# Patient Record
Sex: FEMALE | Race: ASIAN | NOT HISPANIC OR LATINO | Employment: FULL TIME | URBAN - METROPOLITAN AREA
[De-identification: names, ages, dates, MRNs, and addresses within clinical notes are randomized per-mention and may not be internally consistent; named-entity substitution may affect disease eponyms.]

---

## 2017-01-09 ENCOUNTER — GENERIC CONVERSION - ENCOUNTER (OUTPATIENT)
Dept: OTHER | Facility: OTHER | Age: 27
End: 2017-01-09

## 2017-01-12 ENCOUNTER — LAB CONVERSION - ENCOUNTER (OUTPATIENT)
Dept: OTHER | Facility: OTHER | Age: 27
End: 2017-01-12

## 2017-01-12 LAB
ADDITIONAL INFORMATION (HISTORICAL): NORMAL
ADEQUACY: (HISTORICAL): NORMAL
COMMENT (HISTORICAL): NORMAL
CYTOTECHNOLOGIST: (HISTORICAL): NORMAL
HPV MRNA E6/E7 (HISTORICAL): NOT DETECTED
INTERPRETATION (HISTORICAL): NORMAL
LMP (HISTORICAL): NORMAL
PREV. BX: (HISTORICAL): NORMAL
PREV. PAP (HISTORICAL): NORMAL
SOURCE (HISTORICAL): NORMAL

## 2017-10-05 ENCOUNTER — ALLSCRIPTS OFFICE VISIT (OUTPATIENT)
Dept: OTHER | Facility: OTHER | Age: 27
End: 2017-10-05

## 2017-10-17 LAB
AB SCRN, RBC W/RFLX ID,TITER,AG (HISTORICAL): NORMAL
ABO GROUP BLD: NORMAL
BASOPHILS # BLD AUTO: 0.4 %
BASOPHILS # BLD AUTO: 32 CELLS/UL (ref 0–200)
BILIRUB UR QL STRIP: NEGATIVE
COLOR UR: YELLOW
COMMENT (HISTORICAL): CLEAR
DEPRECATED RDW RBC AUTO: 12.2 % (ref 11–15)
EOSINOPHIL # BLD AUTO: 0.9 %
EOSINOPHIL # BLD AUTO: 73 CELLS/UL (ref 15–500)
FECAL OCCULT BLOOD DIAGNOSTIC (HISTORICAL): NEGATIVE
GLUCOSE (HISTORICAL): NEGATIVE
HCT VFR BLD AUTO: 39.4 % (ref 35–45)
HEPATITIS B SURFACE ANTIGEN (HISTORICAL): NORMAL
HGB BLD-MCNC: 13.1 G/DL (ref 11.7–15.5)
KETONES UR STRIP-MCNC: NEGATIVE MG/DL
LEUKOCYTE ESTERASE UR QL STRIP: NEGATIVE
LYMPHOCYTES # BLD AUTO: 2187 CELLS/UL (ref 850–3900)
LYMPHOCYTES # BLD AUTO: 27 %
MCH RBC QN AUTO: 28.7 PG (ref 27–33)
MCHC RBC AUTO-ENTMCNC: 33.2 G/DL (ref 32–36)
MCV RBC AUTO: 86.2 FL (ref 80–100)
MONOCYTES # BLD AUTO: 437 CELLS/UL (ref 200–950)
MONOCYTES (HISTORICAL): 5.4 %
NEUTROPHILS # BLD AUTO: 5370 CELLS/UL (ref 1500–7800)
NEUTROPHILS # BLD AUTO: 66.3 %
NITRITE UR QL STRIP: NEGATIVE
PH UR STRIP.AUTO: 6.5 [PH] (ref 5–8)
PLATELET # BLD AUTO: 277 THOUSAND/UL (ref 140–400)
PMV BLD AUTO: 10.4 FL (ref 7.5–12.5)
PROT UR STRIP-MCNC: NEGATIVE MG/DL
RBC # BLD AUTO: 4.57 MILLION/UL (ref 3.8–5.1)
RH BLD: NORMAL
RPR SCREEN (HISTORICAL): NORMAL
RUBELLA, IGG (HISTORICAL): 3.77 INDEX
SP GR UR STRIP.AUTO: 1.02 (ref 1–1.03)
WBC # BLD AUTO: 8.1 THOUSAND/UL (ref 3.8–10.8)

## 2017-10-19 ENCOUNTER — ALLSCRIPTS OFFICE VISIT (OUTPATIENT)
Dept: OTHER | Facility: OTHER | Age: 27
End: 2017-10-19

## 2017-10-19 LAB
GLUCOSE (HISTORICAL): NEGATIVE
LEUKOCYTE ESTERASE UR QL STRIP: NEGATIVE
NITRITE UR QL STRIP: NEGATIVE
PROT UR STRIP-MCNC: NEGATIVE MG/DL

## 2017-10-20 LAB — CLINICAL COMMENT (HISTORICAL): NORMAL

## 2017-10-23 ENCOUNTER — LAB CONVERSION - ENCOUNTER (OUTPATIENT)
Dept: OTHER | Facility: OTHER | Age: 27
End: 2017-10-23

## 2017-10-23 LAB
ADDITIONAL INFORMATION (HISTORICAL): NORMAL
ADEQUACY: (HISTORICAL): NORMAL
COMMENT (HISTORICAL): NORMAL
CYTOTECHNOLOGIST: (HISTORICAL): NORMAL
INTERPRETATION (HISTORICAL): NORMAL
LMP (HISTORICAL): NORMAL
PREV. BX: (HISTORICAL): NORMAL
PREV. PAP (HISTORICAL): NORMAL
SOURCE (HISTORICAL): NORMAL

## 2017-11-20 ENCOUNTER — GENERIC CONVERSION - ENCOUNTER (OUTPATIENT)
Dept: OTHER | Facility: OTHER | Age: 27
End: 2017-11-20

## 2017-12-18 ENCOUNTER — ALLSCRIPTS OFFICE VISIT (OUTPATIENT)
Dept: PERINATAL CARE | Facility: CLINIC | Age: 27
End: 2017-12-18
Payer: COMMERCIAL

## 2017-12-18 ENCOUNTER — GENERIC CONVERSION - ENCOUNTER (OUTPATIENT)
Dept: OTHER | Facility: OTHER | Age: 27
End: 2017-12-18

## 2017-12-18 PROCEDURE — 76817 TRANSVAGINAL US OBSTETRIC: CPT | Performed by: OBSTETRICS & GYNECOLOGY

## 2017-12-18 PROCEDURE — 76811 OB US DETAILED SNGL FETUS: CPT | Performed by: OBSTETRICS & GYNECOLOGY

## 2017-12-20 ENCOUNTER — ALLSCRIPTS OFFICE VISIT (OUTPATIENT)
Dept: OTHER | Facility: OTHER | Age: 27
End: 2017-12-20

## 2018-01-12 NOTE — PROGRESS NOTES
2016         RE: Vahe Paige                                  To: ANTWAN Aragon    MR#: [id number]                                    [facility] Ob[facility]   : AUG [address]  Way: [id number]:CRISTINA Rich, [address]   (Exam #: [id number])                           Fax: [phone]      The LMP of this 26year old,  G1, P0-0-0-0 patient was 2016, her   working LEONCIO is 2016 and the current gestational age is 25 weeks 5   days by 1st Trimester Sono  A sonographic examination was performed on 2016 using real time equipment  The ultrasound examination was   performed using abdominal & vaginal techniques  The patient has a BMI of   24 7  Her blood pressure today was 107/66         Earliest ultrasound found in her record:16  8w1d 16 LEONCIO      Cardiac motion was observed at 156 bpm       INDICATIONS      fetal anatomical survey      Exam Types      LEVEL II   Transvaginal      RESULTS      Fetus # 1 of 1   Vertex presentation   Fetal growth appeared normal   Placenta Location = Posterior   No placenta previa   Placenta Grade = I      MEASUREMENTS (* Included In Average GA)      AC              14 9 cm        19 weeks 6 days* (34%)   BPD              4 9 cm        20 weeks 5 days* (48%)   HC              18 0 cm        20 weeks 2 days* (38%)   Femur            3 4 cm        20 weeks 6 days* (45%)      Nuchal Fold      4 9 mm      Humerus          3 2 cm        20 weeks 6 days  (53%)   Radius           2 6 cm        20 weeks 6 days   Ulna             2 8 cm        20 weeks 1 day   Tibia            2 8 cm        20 weeks 2 days  (29%)   Fibula           2 8 cm        19 weeks 4 days   Foot             3 3 cm        20 weeks 1 day      Cerebellum       2 1 cm        21 weeks 0 days   Biorbit          3 2 cm        20 weeks 2 days   CisternaMagna    4 7 mm      HC/AC 1 21   FL/AC           0 23   FL/BPD          0 70   EFW (Ac/Fl/Hc)   347 grams - 0 lbs 12 oz      THE AVERAGE GESTATIONAL AGE is 20 weeks 3 days +/- 10 days  AMNIOTIC FLUID         Largest Vertical Pocket = 4 7 cm   Amniotic Fluid: Normal      CERVICAL EVALUATION      SUPINE      Cervical Length: 4 00 cm      OTHER TEST RESULTS           Funneling?: No             Dynamic Changes?: No        Resp  To TFP?: No      ANATOMY      Head                                    Normal   Face/Neck                               Normal   Th  Cav  Normal   Heart                                   Normal   Abd  Cav  Normal   Stomach                                 Normal   Right Kidney                            Normal   Left Kidney                             Normal   Bladder                                 Normal   Abd  Wall                               Normal   Spine                                   Normal   Extrems                                 Normal   Genitalia                               Normal   Placenta                                Normal   Umbl  Cord                              Normal   Uterus                                  Normal   PCI                                     Normal      ANATOMY DETAILS      Visualized Appearing Sonographically Normal:   HEAD: (Calvarium, BPD Level, Cavum, Lateral Ventricles, Choroid Plexus,   Cerebellum, Cisterna Magna);    FACE/NECK: (Neck, Nuchal Fold, Profile,   Orbits, Nose/Lips, Palate, Face);    TH  CAV : (Diaphragm); HEART:   (Four Chamber View, Proximal Left Outflow, Proximal Right Outflow, 3   Vessel Trachea, Short Axis of Greater Vessels, Ductal Arch, Aortic Arch,   Interventricular Septum, Interatrial Septum, Cardiac Axis, Cardiac   Position);    ABD  CAV : (Gall Bladder);    STOMACH, RIGHT KIDNEY, LEFT   KIDNEY, BLADDER, ABD   WALL, SPINE: (Cervical Spine, Thoracic Spine, Lumbar   Spine, Sacrum); EXTREMS: (Lt Humerus, Rt Humerus, Lt Forearm, Rt   Forearm, Lt Hand, Rt Hand, Lt Femur, Rt Femur, Lt Low Leg, Rt Low Leg, Lt   Foot, Rt Foot);    GENITALIA, PLACENTA, UMBL  CORD, UTERUS, PCI      ADNEXA      The left ovary appeared normal and measured 2 0 x 2 0 x 1 8 cm with a   volume of 3 8 cc  The right ovary appeared normal and measured 2 6 x 2 5 x   2 1 cm with a volume of 7 1 cc  IMPRESSION      Estevez IUP   20 weeks and 3 days by this ultrasound  (LEONCIO=2016)   20 weeks and 5 days by 1st Tri Sono  (LEONCIO=2016)   Vertex presentation   Fetal growth appeared normal   Normal anatomy survey   Regular fetal heart rate of 156 bpm   Posterior placenta   No placenta previa      GENERAL COMMENT            I had the pleasure of seeing Raf Ho in the  center on  for level II ultrasound  She is a 16-year-old  1 para 0 with a   working due date of  currently at 20 weeks and 5 days  Her body   mass index is 24  She did have a reassuring part 1 of the sequential   screen  She denies any pregnancy complications  Today's ultrasound was reassuring  A viable fetus was seen in the vertex   presentation  The placenta is posterior in location and there is no   evidence of a placenta previa  Amniotic fluid appeared normal  Fetal   growth appeared very appropriate and correlated well with her due date  There were no obvious anomalies seen today  The anatomic survey was   completed today  There were no Down syndrome markers seen  Both maternal   ovaries were seen and appeared normal  There were no obvious subchorionic   hematomas or uterine myomas  Her cervix was seen transvaginally and   appeared normal in length with no evidence of funneling or dynamic change  The placental cord insertion were seen and was normal in location  The patient appeared well-nourished, well-developed, and in no apparent   distress  Her uterus is nontender   Her abdomen was gravid and nontender  We discussed kick counts in the office today  We discussed the 10 kicks in   2 hour rule  I asked her to call your office if criteria are not met  She   should continue to do her kick counts on a daily basis  Kick counts should   begin at 28 weeks  Overall today's ultrasound was very reassuring for Deyanira  The fetus   appears to be growing well  There were no obvious birth defects nor any   Down syndrome markers  Anatomic survey was completed today  Her cervix was   long and closed  Thus today's ultrasound was reassuring  Further   ultrasounds are at your discretion  Thank you kindly for this referral       ROSS Morales M D     Maternal-Fetal Medicine   Electronically signed 07/11/16 13:59

## 2018-01-12 NOTE — PROGRESS NOTES
Chief Complaint  Patient was in today for OB visit and received flu vaccine into Left Deltoid and handled injection well with no complications    NDC: [id number]  Lot: [id number]  Exp: 6/30/2017      Active Problems    1  Dysmenorrhea (625 3) (N94 6)   2  Encounter for routine gynecological examination (V72 31) (Z01 419)   3  Encounter for supervision of normal first pregnancy (V22 0) (Z34 00)   4  Encounter for supervision of normal first pregnancy in first trimester (V22 0) (Z34 01)   5  Flu vaccine need (V04 81) (Z23)   6  Need for Tdap vaccination (V06 1) (Z23)   7  Oral contraceptive prescribed (V25 01) (Z30 011)   8  Pap smear, as part of routine gynecological examination (V76 2) (Z01 419)   9  Pregnancy (V22 2) (Z33 1)   10  UTI (urinary tract infection) (599 0) (N39 0)    Current Meds   1  Prenate AM 1 MG Oral Tablet; Take 1 tablet daily; Therapy: 50FGH7402 to (Evaluate:26Dgz0121)  Requested for: 14Rth7045; Last   Rx:55Tbm0873 Ordered    Allergies    1  No Known Drug Allergies    2  No Known Environmental Allergies   3   No Known Food Allergies    Vitals  Signs    Systolic: 676  Diastolic: 88  Height: 5 ft 4 in  Weight: 173 lb 4 00 oz  BMI Calculated: 29 74  BSA Calculated: 1 85    Results/Data  OB Global Urine Dip Non-Automated - POC 83CQR7304 03:36PM Mario Elaine     Test Name Result Flag Reference   Protein neg     Glucose neg         Plan  Encounter for supervision of normal first pregnancy    · OB Global Urine Dip Non-Automated - POC; Status:Complete;   Done: 08WHK7713  03:36PM    Future Appointments    Date/Time Provider Specialty Site   10/27/2016 02:30 PM Ana Rosa Swartz [facility] Obstetrics/Gynecology [address] [place] [address] OB/GYN   10/27/2016 02:15 PM Jake Toribio OB/GYN     Signatures   Electronically signed by : Alecia Josue, ; Oct 19 2016  3:47PM EST                       (Author)    Electronically signed by : Carmelina Liu, [facility]; Oct 19 2016 4:02PM EST                       (Author)    Electronically signed by : ANTWAN Angel ; Oct 19 2016  5:41PM EST                       (Author)

## 2018-01-13 VITALS
BODY MASS INDEX: 24.59 KG/M2 | SYSTOLIC BLOOD PRESSURE: 120 MMHG | WEIGHT: 144 LBS | HEIGHT: 64 IN | DIASTOLIC BLOOD PRESSURE: 60 MMHG

## 2018-01-14 NOTE — PROGRESS NOTES
History of Present Illness  HPI: 32 yo  presents for OB intake  She is 10 4/7 wks gestation, calculated by dating US  LMP was inaccurate due to conception while lactating  Final [facility] 18    She plans to continue breast feeding her 9 month old for another two months  OB hx:  2016  (due to cat 2 strip during IOL), full-term healthy female  Hyperemesis during this pregnancy  Past medical history: unremarkable  Past surgical history:   Past family history: unremarkable  Genetic Hx: unremarkable  Infection Hx: unremarkable    Plan:  Normal 1st trimester pregnancy, multigravida  Feeling well overall  Will continue to breast feed for two more months-- reassured of safety  Prenatal lab requisition provided  Discussed screening for aneuploidy-- patient declines  Level 2 US booked with [facility] on 17  Hx of -- patient desires TOLAC    Hx hyperemesis in 1st pregnancy-- no nausea/ vomiting currently    RV 2 wks for initial OB exam- pap & cultures       Active Problems    1  Dysmenorrhea (625 3) (N94 6)   2  Elevated blood pressure   3  Encounter for routine gynecological examination (V72 31) (Z01 419)   4  Encounter for supervision of normal first pregnancy (V22 0) (Z34 00)   5  Encounter for supervision of normal first pregnancy in first trimester (V22 0) (Z34 01)   6  Encounter for supervision of normal pregnancy in multigravida in first trimester (V22 1)   (Z34 81)   7  Flu vaccine need (V04 81) (Z23)   8  Need for Tdap vaccination (V06 1) (Z23)   9  Oral contraceptive prescribed (V25 01) (Z30 011)   10  Pap smear, as part of routine gynecological examination (V76 2) (Z01 419)   11  Pregnancy (V22 2) (Z34 90)   12  UTI (urinary tract infection) (599 0) (N39 0)    Current Meds   1  Prenate AM 1 MG Oral Tablet; Take 1 tablet daily; Therapy: 75FLF9926 to (Evaluate:43Xnk4285)  Requested for: 88Akx8788; Last   Rx:58Nge7698 Ordered    Allergies    1   No Known Drug Allergies    2  No Known Environmental Allergies   3  No Known Food Allergies    Plan  Encounter for supervision of normal pregnancy in multigravida in first trimester    · (Q) CULTURE, URINE, SPECIAL; Status:Active; Requested NPF:98XBC0845;    · (Q) OBSTETRIC PANEL; Status:Active; Requested DJD:28KGG5047;    · (Q) URINALYSIS REFLEX; Status:Active; Requested CTW:58LWO3342;      Future Appointments    Date/Time Provider Specialty Site   2017 01:00 PM  [address]   10/19/2017 04:00 PM IDA Santana  Obstetrics/Gynecology [address] [facility] OB/GYN     Signatures   Electronically signed by : Chauncey Segura NP; Oct  5 2017  5:31PM EST                       (Author)    Electronically signed by : ANTWAN Angel ; Oct  6 2017 12:16PM EST                       (Author)

## 2018-01-14 NOTE — PROGRESS NOTES
To whom it may concern Archie Cueto is our obstetric PT and due to being pregnant pt should not be working more than 8hrs a day she can take the recommended break for working an 8hr shift  Any question or concern may contact  the office       Electronically signed Lennie Morse   May 13 2016  9:25AM EST        Electronically signed by:Milena Manuel [facility]  May 13 2016  9:38AM EST        Electronically signed Gertrude WAHL    May 16 2016 12:28PM EST

## 2018-01-15 ENCOUNTER — OB ABSTRACT (OUTPATIENT)
Dept: OBGYN CLINIC | Facility: CLINIC | Age: 28
End: 2018-01-15

## 2018-01-15 NOTE — MISCELLANEOUS
Message  Spoke with patient regarding CBC  H/H decreased from blood work 4 months ago, but still low end of normal  Increase iron in diet or try OTC supplement once a day to see if that helps alleviate symptoms  Signatures   Electronically signed by : Luca Jeffrey, [facility];  Aug 19 2016  1:47PM EST                       (Author)

## 2018-01-16 NOTE — PROGRESS NOTES
Chief Complaint  pT WAS GIVEN A INTRAMUSCULAR FLU VACCINE ON LEFT DELTOID WITHOUT ANY COMPLICATION  DEW;[id number]  EXP06/30/2018  LOT;[id number]      Active Problems    1  Dysmenorrhea (625 3) (N94 6)   2  Elevated blood pressure   3  Encounter for routine gynecological examination (V72 31) (Z01 419)   4  Encounter for supervision of normal first pregnancy (V22 0) (Z34 00)   5  Encounter for supervision of normal first pregnancy in first trimester (V22 0) (Z34 01)   6  Encounter for supervision of normal pregnancy in multigravida in first trimester (V22 1)   (Z34 81)   7  Flu vaccine need (V04 81) (Z23)   8  Need for Tdap vaccination (V06 1) (Z23)   9  Oral contraceptive prescribed (V25 01) (Z30 011)   10  Pap smear, as part of routine gynecological examination (V76 2) (Z01 419)   11  Pregnancy (V22 2) (Z34 90)   12  UTI (urinary tract infection) (599 0) (N39 0)    Current Meds   1  Prenate AM 1 MG Oral Tablet; Take 1 tablet daily; Therapy: 78EFJ5896 to (Evaluate:59Qts4607)  Requested for: 26Rfy0437; Last   Rx:64Xkz8817 Ordered    Allergies    1  No Known Drug Allergies    2  No Known Environmental Allergies   3  No Known Food Allergies    Vitals  Signs    Systolic: 035  Diastolic: 60  Height: 5 ft 4 in  Weight: 144 lb   BMI Calculated: 24 72  BSA Calculated: 1 7    Results/Data  OB Global Urine Dip Non-Automated - POC 71YXU5966 04:18PM Vinny Sandoval     Test Name Result Flag Reference   Leukocytes Negative     Nitrite Negative     Protein Negative     Glucose Negative         Plan   Pregnancy    · OB Global Urine Dip Non-Automated - POC; Status:Complete;   Done: 99JXO2140  04:18PM    (1) THIN PREP PAP WITH IMAGING; Status:Active; Requested WBX:84WND9869;   Perform:[employer]; Order Comments:Cervical/Endocervical; Due:19Oct2018; Ordered; For:Pregnancy;  Ordered By:Carrie Syed;   ABHILASH(R) CHLAMYDIA/ N  GONORRHOEAE RNA, TMA; Status:Resulted - Requires Verification;   Done: 14PNA5308 12: 00AM  Due:90You7503; Ordered; For:Pregnancy; Ordered By:Jose Syed Mage; Future Appointments    Date/Time Provider Specialty Site   11/15/2017 02:30 PM ANTWAN Guo   [address] OB/GYN   2017 03:00 PM  [facility], [address]   Electronically signed by : Gary Morel NP; Oct 19 2017  6:22PM EST                       (Author)    Electronically signed by : Herman Cushing, M D ; Oct 25 2017 10:06PM EST                       (Author)

## 2018-01-17 ENCOUNTER — ALLSCRIPTS OFFICE VISIT (OUTPATIENT)
Dept: OTHER | Facility: OTHER | Age: 28
End: 2018-01-17

## 2018-01-17 ENCOUNTER — GENERIC CONVERSION - ENCOUNTER (OUTPATIENT)
Dept: OTHER | Facility: OTHER | Age: 28
End: 2018-01-17

## 2018-01-17 NOTE — PROGRESS NOTES
Chief Complaint  Patient is a  with an LEONCIO of 16 by U/S on 16  She is 9w 1d today  Here today with her  for OB intake  Patient c/o of frequent nausea and is vomiting once a day  She does not desire any Zofran today  Currently taking One A Day prenatals, but she does not like them because of a fishy taste  Patient had questions regarding travel  She and her  are taking a cruise to the [facility] the first week of May  Recommended patient to take Zofran with her in case of worsening nausea  She will call if she desires an rx  Also discussed risks of Zika virus, and instructions to prevent mosquito bites  Referred patient to [facility] [employer] web site for further information  Patient is a  at [employer]  Works in a sterile environment, 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 alcohol fumes  Patient to avoid further exposure, or make sure she is wearing proper ventilation equipment  No latex allergy, but she does have sensitive skin; repeated exposure to gloves gives her hives  Slip given for OB labs  Went over [place], and all information given  Consent form signed  Referral entered for sequential screen  No other questions or concerns  Has initial OB appt 16  WIll only need cultures at that time; last PAP was 2016  Time spent 1 hr  Active Problems    1  Dysmenorrhea (625 3) (N94 6)   2  Encounter for routine gynecological examination (V72 31) (Z01 419)   3  Encounter for supervision of normal first pregnancy in first trimester (V22 0) (Z34 01)   4  Oral contraceptive prescribed (V25 01) (Z30 011)   5  Pap smear, as part of routine gynecological examination (V76 2) (Z01 419)    Current Meds   1  Norethin Ace-Eth Estrad-FE 1-20 MG-MCG Oral Tablet; TAKE ONE TABLET BY MOUTH   ONCE DAILY; Therapy: 43JQB1032 to (Last Rx:2016)  Requested for: 63XBL4915 Ordered    Allergies    1  No Known Drug Allergies    2  No Known Environmental Allergies   3   No Known Food Allergies    Assessment    1  Encounter for supervision of normal first pregnancy in first trimester (V22 0) (Z34 01)    Plan  Dysmenorrhea    · Norethin Ace-Eth Estrad-FE 1-20 MG-MCG Oral Tablet (Loestrin Fe 1/20)  Encounter for supervision of normal first pregnancy in first trimester    · One-A-Day Womens Prenatal 28-0 8 & 223 MG Oral Miscellaneous; TAKE 1 MG  Daily   · (1) HIV AG/AB COMBO, 4TH GEN; [Do Not Release]; Status:Active; Requested  for:21Apr2016;    · (1) PRENATAL PANEL; Status:Active; Requested for:21Apr2016;    · (1) URINALYSIS w URINE C/S REFLEX (will reflex a microscopy if leukocytes, occult  blood, or nitrites are not within normal limits); Status:Active; Requested for:21Apr2016;    · *6 - [facility] Physician Referral  Consult for sequential screen  Status:  Hold For - Scheduling  Requested for: 21Apr2016  Care Summary provided  : Yes    Future Appointments    Date/Time Provider Specialty Site   05/09/2016 01:00 PM ANTWAN Biggs  Obstetrics/Gynecology [address] [facility] OB/GYN     Signatures   Electronically signed [facility] : [facility];  Apr 21 2016  4:11PM EST                       (Author)    Electronically signed by : ANTWAN Busch ; Apr 22 2016  9:33AM EST                       (Author)

## 2018-01-17 NOTE — MISCELLANEOUS
Message  Spoke with patient regarding prenatal blood work  Blood type AB+  Not immune to rubella  Explained that we cannot give booster during pregnancy because it is live attenuated virus  Recommended universal precautions  Patient is not in close contact with small children; risk is small  U/A showed UTI  Rx for Macrobid 100 mg BID x 7 days sent to pharmacy  Patient also requested Zofran for cruise  Rx sent  Patient to call with any problems  Signatures   Electronically signed by : Marcelo Lozano, [facility];  Apr 28 2016  2:35PM EST                       (Author)

## 2018-01-18 NOTE — PROGRESS NOTES
MAY 16 2016         RE: Callum Beltran                             To: ANTWAN Gusman    MR#: [id number]                                    [facility]   : [address]  Way: [phone]                             [address]   (Exam #: [id number])                           Fax: [phone]      The LMP of this 26year old,  G1, P0-0-0-0 patient was 2016, her   working LEONCIO is 2016 and the current gestational age is 16 weeks 5   days by [address] [place] [address]  A sonographic examination was performed on MAY   16 2016 using real time equipment  The ultrasound examination was   performed using abdominal technique  The patient has a BMI of 22 3  Her   blood pressure today was 126/65  Earliest ultrasound found in her record:16  8w1d 16 LEONCIO      Thank you very much for referring this very nice patient for a    consultation and genetic screening ultrasound  This is the patient's   first pregnancy and it has been complicated by significant nausea and   vomiting requiring Zofran and Diclegis  The patient has been getting some   relief with this medication although she has lost approximately 5 pounds  She has no other significant medical or surgical history  She denies the   current use of tobacco, alcohol, or drugs  Her medications include   prenatal vitamins and she has no significant drug allergies  Her family   medical history is noncontributory  A review of systems is otherwise   negative  On exam, the patient appears well, in no acute distress, and her   abdomen is nontender  Multiple longitudinal and transverse sections revealed a izaguirre   intrauterine pregnancy with the fetus in variable presentation  The   placenta is anterior in implantation, grade 0 in appearance, and there is   no placenta previa        Cardiac motion was observed at 159 bpm  INDICATIONS      first trimester screening      Exam Types      Level I      RESULTS      Fetus # 1 of 1   Variable presentation   Fetal growth appeared normal      MEASUREMENTS (* Included In Average GA)      CRL              5 9 cm        12 weeks 1 day *   Nuchal Trans    1 50 mm      THE AVERAGE GESTATIONAL AGE is 12 weeks 1 day +/- 7 days  UTERINE ARTERIES                                  S/D   PI    RI    NOTCH       Left Uterine Artery              1 33         No       Right Uterine Artery             1 64         No      ANATOMY COMMENTS      Anatomic detail is limited at this gestational age  The yolk sac was not   noted  The fetal cranium appeared normal in shape and the nuchal   translucency was normal in size (1 5mm)  The nasal bone appears to be   present  The intracranial anatomy was unremarkable  Evaluation of the   spine revealed no obvious evidence for a neural tube defect  Anatomy of   the fetal thorax appeared within normal limits  The cardiac rhythm was   regular  Within the abdomen, stomach was visualized and the abdominal   wall appeared intact  A three vessel cord appears to be present  Active   movement of the fetal body & extremities was seen  There is no suspicion   of a subchorionic bleed  The placental cord insertion was normal    The   uterine artery Dopplers are normal for this gestational age  There is no   suspicion of a uterine myoma  Free fluid is not seen in the posterior   cul-de-sac  ADNEXA      The left ovary appeared normal and measured 2 9 x 2 3 x 1 2 cm with a   volume of 4 2 cc  The right ovary appeared normal and measured 2 7 x 1 3 x   0 8 cm with a volume of 1 5 cc        AMNIOTIC FLUID         Largest Vertical Pocket = 3 6 cm   Amniotic Fluid: Normal      IMPRESSION      Estevez IUP   12 weeks and 1 day by this ultrasound  (LEONCIO=NOV 27 2016)   12 weeks and 5 days by 1st Tri Sono  (LEONCIO=NOV 23 2016)   Variable presentation   Fetal growth appeared normal   Regular fetal heart rate of 159 bpm   Anterior placenta   No placenta previa      GENERAL COMMENT      We discussed the options for genetic screening, including but not limited   to first trimester screening, second trimester screening, combined first   and second trimester screening, noninvasive prenatal testing (NIPT) for   patients at high risk and diagnostic screening through the use of CVS and   amniocentesis  We discussed the risks and benefits of each approach   including the sensitivities and false positive rates as well as the   difference between a screening test and a diagnostic test   At the   conclusion of our discussion the patient elected Sequential Screening to   delineate her risk for fetal aneuploidy  A maternal blood sample was   obtained on the day of sonogram   The first trimester portion of the   screening results, encompassing age, nuchal translucency, and biochemistry   should be available within one week of testing and will be reported from   [facility]   The second stage of sequential screening should be completed   between the 15th and 21st week of pregnancy (ideally between 16-18 weeks)  I discussed with the patient that she should focus on good hydration and   take the prescribed medications as needed to provide relief  Her symptoms   will likely improve in the second trimester  Recommend an anatomy ultrasound be scheduled for 20 weeks gestation  Please note, in addition to the time spent discussing the results of the   ultrasound, I spent approximately 15 minutes of face-to-face time with the   patient, greater than 50% of which was spent in counseling and the   coordination of care for this patient  Thank you very much for allowing us to participate in the care of this   very nice patient  Should you have any questions, please do not hesitate   to contact our office  ROSS Whiteside M D     Electronically signed 05/16/16 11:41

## 2018-01-18 NOTE — PROGRESS NOTES
Chief Complaint  Patient was in today for regular OB visit and received a Tdap Injection into Left Deltoid and handled injection well with no complications    NDC: 11734256576  Lot: [id number]  Exp: 9/30/2018      Active Problems    1  Dysmenorrhea (625 3) (N94 6)   2  Encounter for routine gynecological examination (V72 31) (Z01 419)   3  Encounter for supervision of normal first pregnancy (V22 0) (Z34 00)   4  Encounter for supervision of normal first pregnancy in first trimester (V22 0) (Z34 01)   5  Need for Tdap vaccination (V06 1) (Z23)   6  Oral contraceptive prescribed (V25 01) (Z30 011)   7  Pap smear, as part of routine gynecological examination (V76 2) (Z01 419)   8  Pregnancy (V22 2) (Z33 1)   9  UTI (urinary tract infection) (599 0) (N39 0)    Current Meds   1  Prenate AM 1 MG Oral Tablet; Take 1 tablet daily; Therapy: 35ASR6235 to (Evaluate:83Fpc7240)  Requested for: 89Nwa8384; Last   Rx:[id number] Ordered    Allergies    1  No Known Drug Allergies    2  No Known Environmental Allergies   3  No Known Food Allergies    Vitals  Signs    Systolic: 314  Diastolic: 62  Height: 5 ft 4 in  Weight: 161 lb 8 0 oz  BMI Calculated: 27 72  BSA Calculated: 1 8    Results/Data  OB Global Urine Dip Non-Automated - POC 71Bek2142 03:04PM Pelon Reagan     Test Name Result Flag Reference   Protein neg     Glucose neg     Protein neg     Glucose neg               Plan  Encounter for supervision of normal first pregnancy    · (1) GLUCOSE, 1HR PG (50gm Glu Challenge Preg-Pts); Status:Active - Retrospective By  Protocol Authorization; Requested for:27Hqf2337;    · OB Global Urine Dip Non-Automated - POC; Status:Resulted - Requires  Verification,Retrospective By Protocol Authorization;   Done: 20PIB5417 03:04PM    Future Appointments    Date/Time Provider Specialty Site   09/14/2016 02:30 PM ANTWAN Lugo   Obstetrics/Gynecology  [place]  [facility]     Signatures   Electronically signed by : Rose Amor, ; Aug 31 2016  3:26PM EST                       (Author)    Electronically signed by : ANTWAN Osuna ; Sep  1 2016 11:32AM EST                       (Author)

## 2018-01-22 VITALS
WEIGHT: 157.5 LBS | DIASTOLIC BLOOD PRESSURE: 62 MMHG | HEIGHT: 64 IN | BODY MASS INDEX: 26.89 KG/M2 | SYSTOLIC BLOOD PRESSURE: 110 MMHG

## 2018-01-22 VITALS
DIASTOLIC BLOOD PRESSURE: 66 MMHG | HEIGHT: 64 IN | WEIGHT: 155 LBS | BODY MASS INDEX: 26.46 KG/M2 | SYSTOLIC BLOOD PRESSURE: 118 MMHG

## 2018-01-22 VITALS
WEIGHT: 146.25 LBS | SYSTOLIC BLOOD PRESSURE: 130 MMHG | DIASTOLIC BLOOD PRESSURE: 72 MMHG | HEIGHT: 64 IN | BODY MASS INDEX: 24.97 KG/M2

## 2018-01-23 NOTE — PROGRESS NOTES
DEC 18 2017         RE: Jada May                                  To: ANTWAN Holloway    MR#: [id number]                                    [facility]   : [address]  Way: [phone]                             [address]   (Exam #: [id number])                           Fax: [phone]      The LMP of this 33year old,  G1, P1-0-0-1 patient was 2017, her   working LEONCIO is  and the current gestational age is 22 weeks 1   day by  [facility]  A sonographic examination was performed on DEC   18 2017 using real time equipment  The ultrasound examination was   performed using abdominal & vaginal techniques  The patient has a BMI of   26 4  Her blood pressure today was 121/64  Earliest US on record: 17  9w4d  17   LEONCIO      Cardiac motion was observed at 136 bpm       INDICATIONS      fetal anatomical survey   previous       Exam Types      Transvaginal   LEVEL II      RESULTS      Fetus # 1 of 1   Transverse presentation   Fetal growth appeared normal   Placenta Location = Posterior, fundal   Placenta Grade = I      MEASUREMENTS (* Included In Average GA)      AC              16 9 cm        21 weeks 4 days* (65%)   BPD              5 1 cm        21 weeks 4 days* (61%)   HC              19 5 cm        21 weeks 4 days* (67%)   Femur            3 3 cm        20 weeks 4 days* (29%)      Nuchal Fold      4 8 mm      Humerus          3 1 cm        20 weeks 3 days  (29%)      Cerebellum       2 2 cm        21 weeks 1 day   Biorbit          3 3 cm        21 weeks 0 days   CisternaMagna    4 2 mm      HC/AC           1 15   FL/AC           0 20   FL/BPD          0 64   EFW (Ac/Fl/Hc)   409 grams - 0 lbs 14 oz      THE AVERAGE GESTATIONAL AGE is 21 weeks 2 days +/- 10 days        AMNIOTIC FLUID         Largest Vertical Pocket = 4 9 cm   Amniotic Fluid: Normal CERVICAL EVALUATION      The cervix appeared normal (Ultrasound Examination)  SUPINE      Cervical Length: 3 80 cm      OTHER TEST RESULTS           Funneling?: No             Dynamic Changes?: No        Resp  To TFP?: No      ANATOMY      Head                                    Normal   Face/Neck                               Normal   Th  Cav  Normal   Heart                                   Normal   Abd  Cav  Normal   Stomach                                 Normal   Right Kidney                            Normal   Left Kidney                             Normal   Bladder                                 Normal   Abd  Wall                               Normal   Spine                                   Normal   Extrems                                 Normal   Genitalia                               Normal   Placenta                                Normal   Umbl  Cord                              Normal   Uterus                                  Normal   PCI                                     Normal      ANATOMY DETAILS      Visualized Appearing Sonographically Normal:   HEAD: (Calvarium, BPD Level, Cavum, Lateral Ventricles, Choroid Plexus,   Cerebellum, Cisterna Magna);    FACE/NECK: (Neck, Nuchal Fold, Profile,   Orbits, Nose/Lips, Palate, Face);    TH  CAV  : (Lungs, Diaphragm); HEART: (Four Chamber View, Proximal Left Outflow, Proximal Right Outflow,   3VV, 3 Vessel Trachea, Short Axis of Greater Vessels, Ductal Arch, Aortic   Arch, Interventricular Septum, Interatrial Septum, IVC, SVC, Cardiac Axis,   Cardiac Position);    ABD  CAV : (Liver, Gall Bladder);    STOMACH, RIGHT   KIDNEY, LEFT KIDNEY, BLADDER, ABD   WALL, SPINE: (Cervical Spine, Thoracic   Spine, Lumbar Spine, Sacrum);    EXTREMS: (Lt Humerus, Rt Humerus, Lt   Forearm, Rt Forearm, Lt Hand, Rt Hand, Lt Femur, Rt Femur, Lt Low Leg, Rt   Low Leg, Lt Foot, Rt Foot);    GENITALIA, PLACENTA, UMBL  CORD, UTERUS, PCI      ADNEXA      The left ovary appeared normal and measured 3 1 x 3 4 x 1 2 cm with a   volume of 6 6 cc  The right ovary appeared normal and measured 3 2 x 2 7 x   1 7 cm with a volume of 7 7 cc  IMPRESSION      Estevez IUP   21 weeks and 2 days by this ultrasound  (LEONCIO=2018)   21 weeks and 1 day by 1st Tri Sono  (LEONCIO=2018)   Transverse presentation   Fetal growth appeared normal   Normal anatomy survey   Regular fetal heart rate of 136 bpm   Posterior, fundal placenta      GENERAL COMMENT      Ms Jenn Wiggins is here for anatomy survey and cervical length screening  She   has a prior  delivery and is considering TOLAC  She has declined   Sequential Screen  There is a single live intrauterine pregnancy with size equivalent to   dates  No gross anomalies were identified  An echogenic intracardiac focus is   seen  Amniotic fluid is within normal limits  Transvaginal cervical length measures 38mm indicating low risk for   spontaneous  birth  Evaluation and Management:   The patient was counseled regarding the above findings  A total of 10   minutes were spent in this encounter with >50% of the time spent in   face-to-face counseling and in coordination of care  The limitations of   ultrasound were reviewed  We discussed that while the presence of a normal appearing ultrasound is   reassuring, it does not completely preclude the presence of aneuploidy or   malformation  We discussed the finding today of isolated echogenic intracardiac focus  EIF is believed to represent a microcalcification of the papillary muscle  An EIF is a common finding during a routine second-trimester fetal   anatomic survey and is identified in 3% to 5% of normal fetuses  The   prevalence of EIF may vary according to maternal ethnicity  An EIF is not   considered a structural or functional cardiac abnormality    The only   reported clinical implication is an increased risk of trisomy 24; although   associated with Down syndrome in a number of   studies, the positive LR is low (1 4-1 8) and nonsignificant in many   series  All patients, regardless of ultrasound findings, should be   offered prenatal screening for aneuploidy and should have the option of   invasive diagnostic testing  Counseling for a woman after prenatal   identification of EIF should be guided by presence of other sonographic   markers or structural abnormalities, maternal serum screening for risk of   Down syndrome (if performed), and maternal age  Because an isolated EIF   does not represent a cardiac abnormality, fetal echocardiography is not   needed and no specific follow-up is recommended  Genetic screening and   diagnostic testing options were discussed with her and she declines  She can continue pregnancy with expectant management without further   ultrasounds unless an additional indication or concern arises  At the conclusion of today's encounter, all questions were answered to her   satisfaction  Thank you very much for this kind referral and please do   not hesitate to contact me with any further questions or concerns  ROSS Rawls M D     Maternal Fetal Medicine   Electronically signed 12/18/17 17:00

## 2018-01-24 VITALS
DIASTOLIC BLOOD PRESSURE: 62 MMHG | HEIGHT: 64 IN | BODY MASS INDEX: 27.57 KG/M2 | SYSTOLIC BLOOD PRESSURE: 120 MMHG | WEIGHT: 161.5 LBS

## 2018-01-24 VITALS
HEIGHT: 64 IN | BODY MASS INDEX: 26.29 KG/M2 | DIASTOLIC BLOOD PRESSURE: 64 MMHG | WEIGHT: 154 LBS | SYSTOLIC BLOOD PRESSURE: 121 MMHG

## 2018-02-07 ENCOUNTER — ROUTINE PRENATAL (OUTPATIENT)
Dept: OBGYN CLINIC | Facility: CLINIC | Age: 28
End: 2018-02-07

## 2018-02-07 VITALS — BODY MASS INDEX: 28.63 KG/M2 | SYSTOLIC BLOOD PRESSURE: 120 MMHG | DIASTOLIC BLOOD PRESSURE: 70 MMHG | WEIGHT: 166.8 LBS

## 2018-02-07 DIAGNOSIS — Z34.83 ENCOUNTER FOR SUPERVISION OF NORMAL PREGNANCY IN MULTIGRAVIDA IN THIRD TRIMESTER: Primary | ICD-10-CM

## 2018-02-07 DIAGNOSIS — Z23 NEED FOR TDAP VACCINATION: ICD-10-CM

## 2018-02-07 PROCEDURE — PNV: Performed by: PHYSICIAN ASSISTANT

## 2018-02-07 RX ORDER — .PYRIDOXINE HYDROCHLORIDE, CYANOCOBALAMIN, CALCIUM CARBONATE AND FOLIC ACID 75; 12; 200; 1 MG/1; UG/1; MG/1; MG/1
1 TABLET, COATED ORAL DAILY
COMMUNITY
Start: 2016-05-09 | End: 2018-03-26 | Stop reason: SDUPTHER

## 2018-02-07 NOTE — PROGRESS NOTES
Assessment     Pregnancy 28 and 3/7 weeks     Plan     28-week labs reviewed, given slip today  Kick counts discussed  Follow up in 2 Weeks  Tdap today  Slip for 28 wk labs given  Stressed good hand hygiene  Stay hydrated  Be very careful on ice and snow  [place] is a 33 y o  female being seen today for her obstetrical visit  She is at 28w3d gestation  Patient reports no bleeding, no contractions, no cramping and no leaking  Fetal movement: normal   Patient states she almost fell yesterday, but caught herself on a door handle  She tensed as she thought she was falling, but did not actually fall  Did not hit belly or head  Had some back and stomach soreness last night, but is symptom free today  Good fetal mvmnt  Mild swelling of extremities, but no other complaints  Menstrual History:  OB History      Para Term  AB Living    2 1 1     1    SAB TAB Ectopic Multiple Live Births          0 1         Menarche age: N/A  Patient's last menstrual period was 2017  The following portions of the patient's history were reviewed and updated as appropriate: allergies, current medications, past family history, past medical history, past social history, past surgical history and problem list     Review of Systems  Pertinent items are noted in HPI       Objective     /70   Wt 75 7 kg (166 lb 12 8 oz)   LMP 2017   BMI 28 63 kg/m²   FHT:  136 BPM   Uterine Size: size equals dates   Presentation: unsure

## 2018-02-07 NOTE — PATIENT INSTRUCTIONS
Tdap given today  Slip for 28 wk labs given  Make sure to go first thing in the morning after 8-10 hour fast   Start kick counts  F/u in 2 weeks

## 2018-02-09 DIAGNOSIS — Z34.83 ENCOUNTER FOR SUPERVISION OF NORMAL PREGNANCY IN MULTIGRAVIDA IN THIRD TRIMESTER: Primary | ICD-10-CM

## 2018-02-12 LAB
BASOPHILS # BLD AUTO: 0 CELLS/UL (ref 0–200)
BASOPHILS NFR BLD AUTO: 0 %
EOSINOPHIL # BLD AUTO: 107 CELLS/UL (ref 15–500)
EOSINOPHIL NFR BLD AUTO: 1 %
ERYTHROCYTE [DISTWIDTH] IN BLOOD BY AUTOMATED COUNT: 13.1 % (ref 11–15)
GLUCOSE 1H P 50 G GLC PO SERPL-MCNC: 113 MG/DL
HCT VFR BLD AUTO: 37.5 % (ref 35–45)
HGB BLD-MCNC: 12.7 G/DL (ref 11.7–15.5)
HIV 1+2 AB+HIV1 P24 AG SERPL QL IA: NORMAL
LYMPHOCYTES # BLD MANUAL: 2247 CELLS/UL (ref 850–3900)
LYMPHOCYTES NFR BLD AUTO: 21 %
MCH RBC QN AUTO: 29.3 PG (ref 27–33)
MCHC RBC AUTO-ENTMCNC: 33.9 G/DL (ref 32–36)
MCV RBC AUTO: 86.6 FL (ref 80–100)
MONOCYTES # BLD AUTO: 535 CELLS/UL (ref 200–950)
MONOCYTES NFR BLD AUTO: 5 %
NEUTROPHILS # BLD AUTO: 6741 CELLS/UL (ref 1500–7800)
NEUTROPHILS NFR BLD AUTO: 63 %
NEUTS BAND # BLD: 1070 CELLS/UL (ref 0–750)
NEUTS BAND NFR BLD MANUAL: 10 %
PLATELET # BLD AUTO: 205 THOUSAND/UL (ref 140–400)
PMV BLD REES-ECKER: 10.5 FL (ref 7.5–12.5)
RBC # BLD AUTO: 4.33 MILLION/UL (ref 3.8–5.1)
RPR SER QL: NORMAL
WBC # BLD AUTO: 10.7 THOUSAND/UL (ref 3.8–10.8)

## 2018-02-13 ENCOUNTER — OFFICE VISIT (OUTPATIENT)
Dept: FAMILY MEDICINE CLINIC | Facility: CLINIC | Age: 28
End: 2018-02-13
Payer: COMMERCIAL

## 2018-02-13 VITALS
DIASTOLIC BLOOD PRESSURE: 58 MMHG | HEIGHT: 64 IN | SYSTOLIC BLOOD PRESSURE: 112 MMHG | TEMPERATURE: 96.9 F | BODY MASS INDEX: 28.34 KG/M2 | RESPIRATION RATE: 14 BRPM | HEART RATE: 112 BPM | WEIGHT: 166 LBS

## 2018-02-13 DIAGNOSIS — J06.9 VIRAL UPPER RESPIRATORY TRACT INFECTION: Primary | ICD-10-CM

## 2018-02-13 PROCEDURE — 3008F BODY MASS INDEX DOCD: CPT | Performed by: FAMILY MEDICINE

## 2018-02-13 PROCEDURE — 99213 OFFICE O/P EST LOW 20 MIN: CPT | Performed by: FAMILY MEDICINE

## 2018-02-13 NOTE — PROGRESS NOTES
FAMILY PRACTICE OFFICE VISIT       NAME: Deyanira Gómez  AGE: 33 y o  SEX: female       : 1990        MRN: [id number]    DATE: 2018  TIME: 1:38 PM    Assessment and Plan     Problem List Items Addressed This Visit     None      Visit Diagnoses     Viral upper respiratory tract infection    -  Primary        Patient appears to have a viral upper respiratory infection  She will stay well hydrated and get rest as needed  She may take Tylenol or Robitussin DM as needed for symptoms  She will follow up with her obstetrician next week on routine office visit  Patient will call if she develops any new symptoms before next visit  There are no Patient Instructions on file for this visit  Chief Complaint     Chief Complaint   Patient presents with    Cold Like Symptoms     Patient is here c/o chest congestion, cough, upper back and chest discomfort, sore throat, body aches and fatigue x's 2+ days  History of Present Illness     Patient is 29 weeks pregnant  She denies any documented fevers  She does have coughing  She is only on prenatal vitamins        Review of Systems   Review of Systems   Constitutional: Positive for fatigue  Negative for fever  HENT:        As per HPI   Respiratory: Positive for cough  Negative for shortness of breath and wheezing  Cardiovascular: Negative  Gastrointestinal: Negative  Genitourinary: Negative  Musculoskeletal: Positive for myalgias  Skin: Negative for rash  Active Problem List     Patient Active Problem List   Diagnosis    40 weeks gestation of pregnancy    S/P  section    Elevated blood pressure reading       Past Medical History:  Past Medical History:   Diagnosis Date    Elevated blood pressure reading     Varicella     childhood       Past Surgical History:  Past Surgical History:   Procedure Laterality Date    SC  DELIVERY ONLY N/A 2016    Procedure:  SECTION ();   Surgeon: Julio Pérez Ruddy Cruz MD;  Location: [facility] ;  Service: Obstetrics       Family History:  Family History   Problem Relation Age of Onset    Adopted: Yes    No Known Problems Mother        Social History:  Social History     Social History    Marital status: /Civil Union     Spouse name: N/A    Number of children: N/A    Years of education: N/A     Occupational History    Student      Social History Main Topics    Smoking status: Never Smoker    Smokeless tobacco: Never Used    Alcohol use No    Drug use: No    Sexual activity: Yes     Partners: Male     Other Topics Concern    Not on file     Social History Narrative    Feels safe at home    Single (as per [employer])       Objective     Vitals:    02/13/18 1242   BP: 112/58   Pulse: (!) 112   Resp: 14   Temp: (!) 96 9 °F (36 1 °C)     Wt Readings from Last 3 Encounters:   02/13/18 75 3 kg (166 lb)   02/07/18 75 7 kg (166 lb 12 8 oz)   01/17/18 73 3 kg (161 lb 8 oz)       Physical Exam   Constitutional:   Patient in no acute distress   HENT:   Mouth/Throat: Oropharynx is clear and moist  No oropharyngeal exudate  Tympanic membranes within normal limits   Cardiovascular: Normal rate, regular rhythm and normal heart sounds  Pulmonary/Chest:   Patient with dry cough but lung fields are clear to auscultation without wheezes rales rhonchi   Lymphadenopathy:     She has no cervical adenopathy  Skin: No rash noted         Pertinent Laboratory/Diagnostic Studies:  Lab Results   Component Value Date    GLUCOSE 121 11/12/2016    BUN 5 11/12/2016    CREATININE 0 55 (L) 11/12/2016    CALCIUM 8 8 11/12/2016     11/12/2016    K 3 9 11/12/2016    CO2 22 11/12/2016     11/12/2016     Lab Results   Component Value Date    ALT 13 11/12/2016    AST 14 11/12/2016    ALKPHOS 105 11/12/2016    BILITOT 0 37 11/12/2016       Lab Results   Component Value Date    WBC 8 1 10/14/2017    HGB 12 7 02/09/2018    HCT 37 5 02/09/2018    MCV 86 6 02/09/2018     02/09/2018       No results found for: TSH    No results found for: CHOL  No results found for: TRIG  No results found for: HDL  No results found for: LDLCALC  No results found for: HGBA1C    Results for orders placed or performed in visit on 02/09/18   Glucose, 1H PG   Result Value Ref Range    Glucose 113 <135 mg/dL   CBC and differential   Result Value Ref Range    SL AMB LAB WHITE BLOOD CELL COUNT 10 7 3 8 - 10 8 Thousand/uL    SL AMB LAB RED BLOOD CELLS 4 33 3 80 - 5 10 Million/uL    Hemoglobin 12 7 11 7 - 15 5 g/dL    Hematocrit 37 5 35 0 - 45 0 %    MCV 86 6 80 0 - 100 0 fL    MCH 29 3 27 0 - 33 0 pg    MCHC 33 9 32 0 - 36 0 g/dL    RDW 13 1 11 0 - 15 0 %    Platelet Count 779 358 - 400 Thousand/uL    SL AMB MPV 10 5 7 5 - 12 5 fL   Differential   Result Value Ref Range    Neutrophils (Absolute) 6,741 1,500 - 7,800 cells/uL    SL AMB ABSOLUTE BAND NEUTROPHILS 1,070 (H) 0 - 750 cells/uL    SL AMB LYMPHOCYTES 2,247 850 - 3,900 cells/uL    Monocytes (Absolute) 535 200 - 950 cells/uL    Eosinophils (Absolute) 107 15 - 500 cells/uL    Basophils (Absolute) 0 0 - 200 cells/uL    Neutrophils 63 0 %    SL AMB BAND NEUTROPHILS 10 0 %    Lymphocytes 21 0 %    Monocytes 5 0 %    Eosinophils 1 0 %    Basophils 0 %    Note:     HIV 1/2 Antigen/Antibody,Fourth Generation W/Rfl   Result Value Ref Range    HIV AG/AB, 4th Gen NON-REACTIVE NON-REACTIVE   RPR   Result Value Ref Range    RPR NON-REACTIVE NON-REACTIVE       No orders of the defined types were placed in this encounter  ALLERGIES:  Allergies   Allergen Reactions    Latex Hives       Current Medications     Current Outpatient Prescriptions   Medication Sig Dispense Refill    Prenatal Vit-Fe Fumarate-FA (PRENATAL VITAMIN PO) Take 1 tablet by mouth daily      Prenatal [id number] (PRENATE AM) 1 MG TABS Take 1 tablet by mouth daily       No current facility-administered medications for this visit            Health Maintenance     Health Maintenance Topic Date Due    Depression Screening PHQ-9  08/21/2003    HIV SCREENING  04/22/2019    PAP SMEAR  10/19/2020    DTaP,Tdap,and Td Vaccines (4 - Td) 02/07/2028    INFLUENZA VACCINE  Completed     Immunization History   Administered Date(s) Administered    DTaP 09/27/2016    Influenza 10/19/2016, 10/19/2017    Influenza Quadrivalent Preservative Free 3 years and older IM 10/19/2016    Influenza Quadrivalent, 6-35 Months IM 10/19/2017    MMR 12/01/2016    Tdap 08/31/2016, 55/62/3849       Billy Centeno MD

## 2018-02-15 ENCOUNTER — TELEPHONE (OUTPATIENT)
Dept: OBGYN CLINIC | Facility: CLINIC | Age: 28
End: 2018-02-15

## 2018-02-15 ENCOUNTER — TELEPHONE (OUTPATIENT)
Dept: FAMILY MEDICINE CLINIC | Facility: CLINIC | Age: 28
End: 2018-02-15

## 2018-02-15 DIAGNOSIS — J06.9 UPPER RESPIRATORY TRACT INFECTION, UNSPECIFIED TYPE: Primary | ICD-10-CM

## 2018-02-15 RX ORDER — AMOXICILLIN 875 MG/1
875 TABLET, COATED ORAL 2 TIMES DAILY
Qty: 20 TABLET | Refills: 0 | Status: SHIPPED | OUTPATIENT
Start: 2018-02-15 | End: 2018-02-25

## 2018-02-15 NOTE — TELEPHONE ENCOUNTER
Patient called stating she spoke with her primary care provider  They stated that they were not concerned for pneumonia  Sent in a prescription for amoxicillin  Told patient that she should still be evaluated, and recommended she go to urgent care  She will update us this afternoon on status

## 2018-02-15 NOTE — TELEPHONE ENCOUNTER
I was in the office the other day & I was told to call back today if I wasn't better or developed other symptoms  I now have a 101 w/Tylenol, chills & vomitting  Please call to advise what you would like for me to do

## 2018-02-15 NOTE — TELEPHONE ENCOUNTER
Patient called regarding current illness  States she was seen by her PCP on 2/13 and was told she has a viral upper respiratory infection  Flu test was negative  She was not given any medication at the time  Now has a fever of 101 F as well as vomiting  Fever is with Tylenol use  Explained concern for pneumonia  She will be seen by either her PCP or urgent care today  She is to call this afternoon to update status

## 2018-02-21 ENCOUNTER — ROUTINE PRENATAL (OUTPATIENT)
Dept: OBGYN CLINIC | Facility: CLINIC | Age: 28
End: 2018-02-21

## 2018-02-21 VITALS
DIASTOLIC BLOOD PRESSURE: 74 MMHG | HEIGHT: 64 IN | WEIGHT: 167 LBS | BODY MASS INDEX: 28.51 KG/M2 | SYSTOLIC BLOOD PRESSURE: 118 MMHG

## 2018-02-21 DIAGNOSIS — Z3A.30 30 WEEKS GESTATION OF PREGNANCY: Primary | ICD-10-CM

## 2018-02-21 PROCEDURE — PNV: Performed by: OBSTETRICS & GYNECOLOGY

## 2018-02-21 NOTE — PATIENT INSTRUCTIONS
Normal OB visit today    Patient encouraged to eat well and take in adequate fluids    She will be seen in 2 weeks for routine follow-up    She was told to call the office should any problems developed during the interim

## 2018-02-21 NOTE — PROGRESS NOTES
Assessment     Pregnancy 30 and 3/7 weeks     Plan     28-week labs reviewed, normal  Consent signed  Follow up in 2 Weeks  Debby Ventura is a 33 y o  female being seen today for her obstetrical visit  She is at 30w3d gestation  Patient reports no complaints  Fetal movement: normal     Menstrual History:  OB History      Para Term  AB Living    2 1 1     1    SAB TAB Ectopic Multiple Live Births          0 1         Menarche age:   Patient's last menstrual period was 2017  The following portions of the patient's history were reviewed and updated as appropriate: allergies, current medications, past family history, past medical history, past social history, past surgical history and problem list     Review of Systems  Pertinent items are noted in HPI  Objective     /74 (BP Location: Right arm, Patient Position: Sitting, Cuff Size: Standard)   Ht 5' 4" (1 626 m)   Wt 75 8 kg (167 lb)   LMP 2017   BMI 28 67 kg/m²   FHT:  141 BPM   Uterine Size: size equals dates   Presentation: cephalic          Assessment/Plan:      Diagnoses and all orders for this visit:    30 weeks gestation of pregnancy          Subjective:     Patient ID: Kevin Lovelace is a 33 y o  female  Patient is here today for her obstetrical exam    She reports no complaints today      She states that there is good fetal activity    She denies any bleeding, leakage, or any vaginal discharge    She is eating well and taking adequate fluids    She is keeping her feet elevated when able to            Review of Systems      Noncontributory today    Objective:     Physical Exam      Normal OB check today    Uterine height is consistent with dates    Minimal peripheral edema noted

## 2018-03-06 ENCOUNTER — ROUTINE PRENATAL (OUTPATIENT)
Dept: OBGYN CLINIC | Facility: CLINIC | Age: 28
End: 2018-03-06

## 2018-03-06 VITALS — BODY MASS INDEX: 29.39 KG/M2 | SYSTOLIC BLOOD PRESSURE: 100 MMHG | WEIGHT: 171.2 LBS | DIASTOLIC BLOOD PRESSURE: 70 MMHG

## 2018-03-06 DIAGNOSIS — Z34.83 ENCOUNTER FOR SUPERVISION OF NORMAL PREGNANCY IN MULTIGRAVIDA IN THIRD TRIMESTER: Primary | ICD-10-CM

## 2018-03-06 PROCEDURE — PNV: Performed by: PHYSICIAN ASSISTANT

## 2018-03-06 NOTE — PROGRESS NOTES
Assessment     Pregnancy 32 and 2/7 weeks     Plan     28-week labs reviewed, normal  Discussed plan of care for end of pregnancy  Follow up in 2 Weeks  1 hr GTT WNL  Patient to make appointment for check-in at L&D  Growth scan here at 36 wks  Lane Henderson is a 33 y o  female being seen today for her obstetrical visit  She is at 32w2d gestation  Patient reports no bleeding, no cramping, no leaking, occasional contractions and Mild swelling  Fetal movement: normal   Occ BH contractions  Does not have any f/u w/PNC  Menstrual History:  OB History      Para Term  AB Living    2 1 1     1    SAB TAB Ectopic Multiple Live Births          0 1         Menarche age: n/a  Patient's last menstrual period was 2017  The following portions of the patient's history were reviewed and updated as appropriate: allergies, current medications, past medical history, past social history, past surgical history and problem list     Review of Systems  Pertinent items are noted in HPI       Objective     /70   Wt 77 7 kg (171 lb 3 2 oz)   LMP 2017   BMI 29 39 kg/m²   FHT:  140 BPM   Uterine Size: size equals dates   Presentation: unsure

## 2018-03-07 NOTE — PROGRESS NOTES
Education  Baby & Me Education 1st Trimester:   First Trimester Education provided:   benefits of breastfeeding, importance of exclusive breastfeeding, early initiation of breastfeeding, Pregnancy Essentials Reference Guide given and Other education given: prenatal folder forms   The patient is planning on breastfeeding     Prenatal education provided by: EVA Prakash      Signatures   Electronically signed by : Chauncey Segura NP; Oct  5 2017  5:02PM EST                       (Author)

## 2018-03-07 NOTE — PROGRESS NOTES
Education  Baby & Me Education 2nd Trimester:   Second Trimester Education provided:   benefits of breastfeeding, importance of exclusive breastfeeding, early initiation of breastfeeding, exclusive breastfeeding for the first 6 months and rooming-in on 24 hour basis  Mother has not registered for prenatal class  Thought has been given to selecting a pediatrician  The mother has not discussed personal preferences with her provider     Prenatal education provided by: Mitchel Zhu      Signatures   Electronically signed by : Bony Dill [facility]; Jan 17 2018  4:18PM EST                       (Author)

## 2018-03-20 ENCOUNTER — ROUTINE PRENATAL (OUTPATIENT)
Dept: OBGYN CLINIC | Facility: CLINIC | Age: 28
End: 2018-03-20

## 2018-03-20 VITALS — DIASTOLIC BLOOD PRESSURE: 62 MMHG | WEIGHT: 176 LBS | BODY MASS INDEX: 30.21 KG/M2 | SYSTOLIC BLOOD PRESSURE: 120 MMHG

## 2018-03-20 DIAGNOSIS — Z34.83 ENCOUNTER FOR SUPERVISION OF NORMAL PREGNANCY IN MULTIGRAVIDA IN THIRD TRIMESTER: Primary | ICD-10-CM

## 2018-03-20 PROCEDURE — PNV: Performed by: PHYSICIAN ASSISTANT

## 2018-03-20 NOTE — PROGRESS NOTES
Assessment     Pregnancy 34 and 2/7 weeks     Plan     28-week labs reviewed, normal  Stressed kick counts  Follow up in 1 Week  GBS next week  Growth scan in office at 36 wks  Discussed possible TOLAC  Raymond Bustos is a 33 y o  female being seen today for her obstetrical visit  She is at 34w2d gestation  Patient reports no bleeding, no contractions, no cramping and no leaking  Fetal movement: normal   Patient feeling well  Mild swelling of the hands first thing in the morning  Menstrual History:  OB History      Para Term  AB Living    2 1 1     1    SAB TAB Ectopic Multiple Live Births          0 1         Menarche age: N/A  Patient's last menstrual period was 2017  The following portions of the patient's history were reviewed and updated as appropriate: allergies, current medications, past medical history, past social history, past surgical history and problem list     Review of Systems  Pertinent items are noted in HPI       Objective     /62   Wt 79 8 kg (176 lb)   LMP 2017   BMI 30 21 kg/m²   FHT:  142 BPM   Uterine Size: size equals dates   Presentation: unsure

## 2018-03-28 ENCOUNTER — ROUTINE PRENATAL (OUTPATIENT)
Dept: OBGYN CLINIC | Facility: CLINIC | Age: 28
End: 2018-03-28

## 2018-03-28 VITALS
BODY MASS INDEX: 30.15 KG/M2 | SYSTOLIC BLOOD PRESSURE: 114 MMHG | DIASTOLIC BLOOD PRESSURE: 64 MMHG | HEIGHT: 64 IN | WEIGHT: 176.6 LBS

## 2018-03-28 DIAGNOSIS — Z3A.35 35 WEEKS GESTATION OF PREGNANCY: ICD-10-CM

## 2018-03-28 DIAGNOSIS — N89.8 VAGINAL DISCHARGE: ICD-10-CM

## 2018-03-28 DIAGNOSIS — Z34.83 ENCOUNTER FOR SUPERVISION OF NORMAL PREGNANCY IN MULTIGRAVIDA IN THIRD TRIMESTER: Primary | ICD-10-CM

## 2018-03-28 PROCEDURE — 87510 GARDNER VAG DNA DIR PROBE: CPT | Performed by: PHYSICIAN ASSISTANT

## 2018-03-28 PROCEDURE — 87480 CANDIDA DNA DIR PROBE: CPT | Performed by: PHYSICIAN ASSISTANT

## 2018-03-28 PROCEDURE — 87660 TRICHOMONAS VAGIN DIR PROBE: CPT | Performed by: PHYSICIAN ASSISTANT

## 2018-03-28 PROCEDURE — 87653 STREP B DNA AMP PROBE: CPT | Performed by: PHYSICIAN ASSISTANT

## 2018-03-28 PROCEDURE — PNV: Performed by: PHYSICIAN ASSISTANT

## 2018-03-28 NOTE — PROGRESS NOTES
Assessment     Pregnancy 35 and 3/7 weeks     Plan     28-week labs reviewed, normal  Discussed labor precautions  Follow up in 1 Week  GBS done  Vaginal cultures done  Cervix 1-2 cm, soft, mid  Copious, thick, white discharge present  Growth scan in office next week  Discussed TOLAC  Lennie Mckinney is a 33 y o  female being seen today for her obstetrical visit  She is at 35w3d gestation  Patient reports no bleeding, no leaking, occasional contractions and swelling of hands and feet    Fetal movement: normal   Patient doing well  Has had intervals of contractions over the last 48 hours, as well as increasing pelvic pressure and coccyx discomfort  Contractions are irregular, and last for a minute or two  They dissipate if patient drinks water or lies down  She is complaining of thick, white, mucous discharge that started several days ago  Denies odor, itching, and burning  Menstrual History:  OB History      Para Term  AB Living    2 1 1     1    SAB TAB Ectopic Multiple Live Births          0 1        Obstetric Comments    Prior csection -wants to TOLAC, short interval between pregnancy          Menarche age: N/A  Patient's last menstrual period was 2017  The following portions of the patient's history were reviewed and updated as appropriate: allergies, current medications, past medical history, past social history, past surgical history and problem list     Review of Systems  Pertinent items are noted in HPI       Objective     /64   Ht 5' 4" (1 626 m)   Wt 80 1 kg (176 lb 9 6 oz)   LMP 2017   BMI 30 31 kg/m²   FHT:  136 BPM   Uterine Size: size equals dates   Presentation: cephalic

## 2018-03-30 LAB
CANDIDA RRNA VAG QL PROBE: NEGATIVE
G VAGINALIS RRNA GENITAL QL PROBE: NEGATIVE
T VAGINALIS RRNA GENITAL QL PROBE: NEGATIVE

## 2018-03-31 LAB — GP B STREP DNA SPEC QL NAA+PROBE: NORMAL

## 2018-04-05 ENCOUNTER — ROUTINE PRENATAL (OUTPATIENT)
Dept: OBGYN CLINIC | Facility: CLINIC | Age: 28
End: 2018-04-05
Payer: COMMERCIAL

## 2018-04-05 ENCOUNTER — ULTRASOUND (OUTPATIENT)
Dept: OBGYN CLINIC | Facility: CLINIC | Age: 28
End: 2018-04-05
Payer: COMMERCIAL

## 2018-04-05 VITALS — BODY MASS INDEX: 30.18 KG/M2 | WEIGHT: 175.8 LBS | SYSTOLIC BLOOD PRESSURE: 110 MMHG | DIASTOLIC BLOOD PRESSURE: 70 MMHG

## 2018-04-05 DIAGNOSIS — Z34.83 ENCOUNTER FOR SUPERVISION OF NORMAL PREGNANCY IN MULTIGRAVIDA IN THIRD TRIMESTER: Primary | ICD-10-CM

## 2018-04-05 DIAGNOSIS — Z36.89 ENCOUNTER FOR ULTRASOUND TO CHECK FETAL GROWTH: Primary | ICD-10-CM

## 2018-04-05 PROCEDURE — 76816 OB US FOLLOW-UP PER FETUS: CPT

## 2018-04-05 PROCEDURE — PNV: Performed by: PHYSICIAN ASSISTANT

## 2018-04-05 NOTE — PROGRESS NOTES
Assessment     Pregnancy 36 and 4/7 weeks     Plan     28-week labs reviewed, normal  Labor precautions discussed  Follow up in 1 Week  Dilation of 2 cm, cervix soft and mid position  Still desires TOLAC  Michelle Cardoza is a 33 y o  female being seen today for her obstetrical visit  She is at 36w4d gestation  Patient reports no bleeding, no leaking and occasional contractions  Fetal movement: normal   Patient doing well  Contractions are irregular  U/S today showed growth in the 38th%ile  GBS negative  Vaginal culture from last week negative  Mild swelling of hands and feet  Menstrual History:  OB History      Para Term  AB Living    2 1 1     1    SAB TAB Ectopic Multiple Live Births          0 1        Obstetric Comments    Prior csection -wants to TOLAC, short interval between pregnancy          Menarche age: N/A  Patient's last menstrual period was 2017  The following portions of the patient's history were reviewed and updated as appropriate: allergies, current medications, past medical history, past social history, past surgical history and problem list     Review of Systems  Pertinent items are noted in HPI       Objective     /70   Wt 79 7 kg (175 lb 12 8 oz)   LMP 2017   BMI 30 18 kg/m²   FHT:  137 BPM   Uterine Size: size equals dates   Presentation: cephalic

## 2018-04-09 ENCOUNTER — HOSPITAL ENCOUNTER (OUTPATIENT)
Facility: HOSPITAL | Age: 28
Discharge: HOME | End: 2018-04-09
Attending: OBSTETRICS & GYNECOLOGY | Admitting: OBSTETRICS & GYNECOLOGY
Payer: COMMERCIAL

## 2018-04-09 VITALS
SYSTOLIC BLOOD PRESSURE: 135 MMHG | DIASTOLIC BLOOD PRESSURE: 66 MMHG | RESPIRATION RATE: 20 BRPM | BODY MASS INDEX: 29.88 KG/M2 | TEMPERATURE: 97.9 F | HEART RATE: 102 BPM | HEIGHT: 64 IN | WEIGHT: 175 LBS

## 2018-04-09 PROCEDURE — 99214 OFFICE O/P EST MOD 30 MIN: CPT

## 2018-04-09 PROCEDURE — G0463 HOSPITAL OUTPT CLINIC VISIT: HCPCS

## 2018-04-10 ENCOUNTER — ANESTHESIA EVENT (INPATIENT)
Dept: LABOR AND DELIVERY | Facility: HOSPITAL | Age: 28
End: 2018-04-10
Payer: COMMERCIAL

## 2018-04-10 ENCOUNTER — ANESTHESIA (INPATIENT)
Dept: LABOR AND DELIVERY | Facility: HOSPITAL | Age: 28
End: 2018-04-10
Payer: COMMERCIAL

## 2018-04-10 ENCOUNTER — HOSPITAL ENCOUNTER (INPATIENT)
Facility: HOSPITAL | Age: 28
LOS: 2 days | Discharge: HOME | End: 2018-04-12
Attending: OBSTETRICS & GYNECOLOGY | Admitting: OBSTETRICS & GYNECOLOGY
Payer: COMMERCIAL

## 2018-04-10 ENCOUNTER — TELEPHONE (OUTPATIENT)
Dept: OBGYN CLINIC | Facility: CLINIC | Age: 28
End: 2018-04-10

## 2018-04-10 DIAGNOSIS — Z3A.37 37 WEEKS GESTATION OF PREGNANCY: Primary | ICD-10-CM

## 2018-04-10 PROBLEM — O34.219 VAGINAL BIRTH AFTER CESAREAN (VBAC): Status: ACTIVE | Noted: 2018-04-10

## 2018-04-10 LAB
ABO GROUP BLD: NORMAL
BASE EXCESS BLDCOA CALC-SCNC: -5.2 MMOL/L (ref 3–11)
BASE EXCESS BLDCOV CALC-SCNC: -5 MMOL/L (ref 1–9)
BLD GP AB SCN SERPL QL: NEGATIVE
ERYTHROCYTE [DISTWIDTH] IN BLOOD BY AUTOMATED COUNT: 14.4 % (ref 11.6–15.1)
HCO3 BLDCOA-SCNC: 25.9 MMOL/L (ref 17.3–27.3)
HCO3 BLDCOV-SCNC: 23.4 MMOL/L (ref 12.2–28.6)
HCT VFR BLD AUTO: 41.5 % (ref 34.8–46.1)
HGB BLD-MCNC: 13.5 G/DL (ref 11.5–15.4)
MCH RBC QN AUTO: 28.6 PG (ref 26.8–34.3)
MCHC RBC AUTO-ENTMCNC: 32.5 G/DL (ref 31.4–37.4)
MCV RBC AUTO: 88 FL (ref 82–98)
O2 CT VFR BLDCOA CALC: 11.5 ML/DL
OXYHGB MFR BLDCOA: 37 %
OXYHGB MFR BLDCOV: 59.5 %
PCO2 BLDCOA: 70.9 MM[HG] (ref 30–60)
PCO2 BLDCOV: 54.5 MM HG (ref 27–43)
PH BLDCOA: 7.18 [PH] (ref 7.23–7.43)
PH BLDCOV: 7.25 [PH] (ref 7.19–7.49)
PLATELET # BLD AUTO: 180 THOUSANDS/UL (ref 149–390)
PMV BLD AUTO: 10.4 FL (ref 8.9–12.7)
PO2 BLDCOA: 22.3 MM HG (ref 5–25)
PO2 BLDCOV: 28.9 MM HG (ref 15–45)
RBC # BLD AUTO: 4.72 MILLION/UL (ref 3.81–5.12)
RH BLD: POSITIVE
SAO2 % BLDCOV: 19.1 ML/DL
SPECIMEN EXPIRATION DATE: NORMAL
WBC # BLD AUTO: 17.32 THOUSAND/UL (ref 4.31–10.16)

## 2018-04-10 PROCEDURE — 86901 BLOOD TYPING SEROLOGIC RH(D): CPT | Performed by: STUDENT IN AN ORGANIZED HEALTH CARE EDUCATION/TRAINING PROGRAM

## 2018-04-10 PROCEDURE — 0UQMXZZ REPAIR VULVA, EXTERNAL APPROACH: ICD-10-PCS | Performed by: OBSTETRICS & GYNECOLOGY

## 2018-04-10 PROCEDURE — 82805 BLOOD GASES W/O2 SATURATION: CPT | Performed by: OBSTETRICS & GYNECOLOGY

## 2018-04-10 PROCEDURE — 86850 RBC ANTIBODY SCREEN: CPT | Performed by: STUDENT IN AN ORGANIZED HEALTH CARE EDUCATION/TRAINING PROGRAM

## 2018-04-10 PROCEDURE — 10907ZC DRAINAGE OF AMNIOTIC FLUID, THERAPEUTIC FROM PRODUCTS OF CONCEPTION, VIA NATURAL OR ARTIFICIAL OPENING: ICD-10-PCS | Performed by: OBSTETRICS & GYNECOLOGY

## 2018-04-10 PROCEDURE — 86900 BLOOD TYPING SEROLOGIC ABO: CPT | Performed by: STUDENT IN AN ORGANIZED HEALTH CARE EDUCATION/TRAINING PROGRAM

## 2018-04-10 PROCEDURE — 86592 SYPHILIS TEST NON-TREP QUAL: CPT | Performed by: STUDENT IN AN ORGANIZED HEALTH CARE EDUCATION/TRAINING PROGRAM

## 2018-04-10 PROCEDURE — 85027 COMPLETE CBC AUTOMATED: CPT | Performed by: STUDENT IN AN ORGANIZED HEALTH CARE EDUCATION/TRAINING PROGRAM

## 2018-04-10 RX ORDER — OXYTOCIN/RINGER'S LACTATE 30/500 ML
250 PLASTIC BAG, INJECTION (ML) INTRAVENOUS CONTINUOUS
Status: ACTIVE | OUTPATIENT
Start: 2018-04-10 | End: 2018-04-10

## 2018-04-10 RX ORDER — OXYTOCIN/RINGER'S LACTATE 30/500 ML
PLASTIC BAG, INJECTION (ML) INTRAVENOUS
Status: COMPLETED
Start: 2018-04-10 | End: 2018-04-10

## 2018-04-10 RX ORDER — SIMETHICONE 80 MG
80 TABLET,CHEWABLE ORAL 4 TIMES DAILY PRN
Status: DISCONTINUED | OUTPATIENT
Start: 2018-04-10 | End: 2018-04-12 | Stop reason: HOSPADM

## 2018-04-10 RX ORDER — DIPHENHYDRAMINE HCL 25 MG
25 TABLET ORAL EVERY 6 HOURS PRN
Status: DISCONTINUED | OUTPATIENT
Start: 2018-04-10 | End: 2018-04-12 | Stop reason: HOSPADM

## 2018-04-10 RX ORDER — DOCUSATE SODIUM 100 MG/1
100 CAPSULE, LIQUID FILLED ORAL 2 TIMES DAILY
Status: DISCONTINUED | OUTPATIENT
Start: 2018-04-10 | End: 2018-04-12 | Stop reason: HOSPADM

## 2018-04-10 RX ORDER — METOCLOPRAMIDE HYDROCHLORIDE 5 MG/ML
10 INJECTION INTRAMUSCULAR; INTRAVENOUS EVERY 6 HOURS PRN
Status: DISCONTINUED | OUTPATIENT
Start: 2018-04-10 | End: 2018-04-12 | Stop reason: HOSPADM

## 2018-04-10 RX ORDER — SENNOSIDES 8.6 MG
1 TABLET ORAL DAILY
Status: DISCONTINUED | OUTPATIENT
Start: 2018-04-11 | End: 2018-04-12 | Stop reason: HOSPADM

## 2018-04-10 RX ORDER — SODIUM CHLORIDE, SODIUM LACTATE, POTASSIUM CHLORIDE, CALCIUM CHLORIDE 600; 310; 30; 20 MG/100ML; MG/100ML; MG/100ML; MG/100ML
125 INJECTION, SOLUTION INTRAVENOUS CONTINUOUS
Status: DISCONTINUED | OUTPATIENT
Start: 2018-04-10 | End: 2018-04-10

## 2018-04-10 RX ORDER — ONDANSETRON 2 MG/ML
4 INJECTION INTRAMUSCULAR; INTRAVENOUS EVERY 6 HOURS PRN
Status: DISCONTINUED | OUTPATIENT
Start: 2018-04-10 | End: 2018-04-12 | Stop reason: HOSPADM

## 2018-04-10 RX ORDER — OXYCODONE HYDROCHLORIDE AND ACETAMINOPHEN 5; 325 MG/1; MG/1
1 TABLET ORAL EVERY 4 HOURS PRN
Status: DISCONTINUED | OUTPATIENT
Start: 2018-04-10 | End: 2018-04-12 | Stop reason: HOSPADM

## 2018-04-10 RX ORDER — ACETAMINOPHEN 325 MG/1
650 TABLET ORAL EVERY 6 HOURS PRN
Status: DISCONTINUED | OUTPATIENT
Start: 2018-04-10 | End: 2018-04-12 | Stop reason: HOSPADM

## 2018-04-10 RX ORDER — OXYCODONE HYDROCHLORIDE AND ACETAMINOPHEN 5; 325 MG/1; MG/1
2 TABLET ORAL EVERY 4 HOURS PRN
Status: DISCONTINUED | OUTPATIENT
Start: 2018-04-10 | End: 2018-04-12 | Stop reason: HOSPADM

## 2018-04-10 RX ORDER — IBUPROFEN 600 MG/1
600 TABLET ORAL EVERY 6 HOURS PRN
Status: DISCONTINUED | OUTPATIENT
Start: 2018-04-10 | End: 2018-04-12 | Stop reason: HOSPADM

## 2018-04-10 RX ORDER — SODIUM CHLORIDE, SODIUM LACTATE, POTASSIUM CHLORIDE, CALCIUM CHLORIDE 600; 310; 30; 20 MG/100ML; MG/100ML; MG/100ML; MG/100ML
125 INJECTION, SOLUTION INTRAVENOUS CONTINUOUS
Status: DISCONTINUED | OUTPATIENT
Start: 2018-04-10 | End: 2018-04-12 | Stop reason: HOSPADM

## 2018-04-10 RX ORDER — BISACODYL 10 MG
10 SUPPOSITORY, RECTAL RECTAL DAILY PRN
Status: DISCONTINUED | OUTPATIENT
Start: 2018-04-10 | End: 2018-04-12 | Stop reason: HOSPADM

## 2018-04-10 RX ORDER — DIAPER,BRIEF,INFANT-TODD,DISP
1 EACH MISCELLANEOUS AS NEEDED
Status: DISCONTINUED | OUTPATIENT
Start: 2018-04-10 | End: 2018-04-12 | Stop reason: HOSPADM

## 2018-04-10 RX ADMIN — SODIUM CHLORIDE, SODIUM LACTATE, POTASSIUM CHLORIDE, AND CALCIUM CHLORIDE 125 ML/HR: .6; .31; .03; .02 INJECTION, SOLUTION INTRAVENOUS at 17:42

## 2018-04-10 RX ADMIN — SODIUM CHLORIDE, SODIUM LACTATE, POTASSIUM CHLORIDE, AND CALCIUM CHLORIDE 125 ML/HR: .6; .31; .03; .02 INJECTION, SOLUTION INTRAVENOUS at 16:31

## 2018-04-10 RX ADMIN — BENZOCAINE AND LEVOMENTHOL 1 APPLICATION: 200; 5 SPRAY TOPICAL at 20:04

## 2018-04-10 RX ADMIN — ROPIVACAINE HYDROCHLORIDE: 2 INJECTION, SOLUTION EPIDURAL; INFILTRATION at 17:12

## 2018-04-10 RX ADMIN — DOCUSATE SODIUM 100 MG: 100 CAPSULE, LIQUID FILLED ORAL at 21:38

## 2018-04-10 RX ADMIN — Medication 250 MILLI-UNITS/MIN: at 18:20

## 2018-04-10 RX ADMIN — SODIUM CHLORIDE, SODIUM LACTATE, POTASSIUM CHLORIDE, AND CALCIUM CHLORIDE 999 ML/HR: .6; .31; .03; .02 INJECTION, SOLUTION INTRAVENOUS at 15:46

## 2018-04-10 NOTE — OB LABOR/OXYTOCIN SAFETY PROGRESS
- Lauri G[place]boro 33 y o  female MRN: [id number]    Unit/Bed#: -01 Encounter: [id number]    Obstetric History       T1      L1     SAB0   TAB0   Ectopic0   Multiple0   Live Births1    Obstetric Comments   Prior csection -wants to TOLAC, short interval between pregnancy      Gestational Age: 42w2d     Contraction Frequency (minutes): irregular (difficult to  ctx pattern, pt moving)  Contraction Quality: Moderate      Dilation: 5        Effacement (%): 80  Station: -2  Baseline Rate: 125 bpm (125-130)  Fetal Heart Rate: 127 BPM  FHR Category: Category II          Notes/comments:      Exam revealed patient to be 6-7 cm / 80 % / -1    FHT's  Reactive    AROM   Clear       Continue observation           Carrol Aleman MD 4/10/2018 5:33 PM

## 2018-04-10 NOTE — PROGRESS NOTES
L&D Triage Note - OB/GYN  Lauri Deshpande 33 y o  female MRN: [id number]  Unit/Bed#: LD Triage 2 Encounter: [id number]      Assessment:  33 y o   at 42w4d with blood tinged mucous discharge    Plan:  1  Blood tinged mucous discharge  -patient not in labor  2  Discharge home  -labor, fetal kick count, and LOF precautions given  -d/w Dr Alondra Fine      ______________________________________________________________________      Chief Compliant: blood tinged mucous    TIME:   Subjective:  33 y o  Katynell Finders at 37w1d complaining of an episode of bloody mucous discharge earlier this afternoon  She is feeling intermittent cramping, but no regular painful contractions  She denies any LOF and reports good fetal movement  Pregnancy is complicated by prior LTCS in 2016 for NRFHT  Patient desires TOLAC at this time        Objective:  Vitals:    187   BP: 135/66   Pulse: 102   Resp:    Temp:        SVE: 1 / 50% / -2  FHT:  125 / Moderate 6 - 25 bpm / + accelerations, reactive  [place]: irregular, patient not feeling them          Vipin Burnett MD 2018 8:45 PM

## 2018-04-10 NOTE — H&P
H&P Exam - Obstetrics   Rohini Ribeiro 33 y o  female MRN: [id number]  Unit/Bed#:  Triage  Encounter: [id number]      History of Present Illness     Chief Complaint: Contractions    HPI:  Rohini Ribeiro is a 33 y o   female with an LEONCIO of 2018, by Ultrasound at 37w2d weeks gestation who is being admitted for labor  Contractions: yes every 5 minutes, started last night  Vaginal Bleeding:yes  Loss of Fluid:no  Fetal Movement:yes    PREGNANCY COMPLICATIONS: prior  section in  for NRFHT, desires TOLAC    OB History    Para Term  AB Living   2 1 1     1   SAB TAB Ectopic Multiple Live Births         0 1      # Outcome Date GA Lbr Preet/2nd Weight Sex Delivery Anes PTL Lv   2 Current            1 Term 16 40w5d  3140 g (6 lb 14 8 oz) F CS-LTranv EPI N IRENA      Complications: Fetal Intolerance      Obstetric Comments   Prior csection -wants to TOLAC, short interval between pregnancy        Baby complications/comments: vertex, ~6 5 pounds, ultrasound growth at 18 at 38%    Review of Systems   Constitutional: Negative for chills and fever  Eyes: Negative for visual disturbance  Respiratory: Negative for shortness of breath  Cardiovascular: Negative for chest pain  Gastrointestinal: Negative for constipation, diarrhea, nausea and vomiting  Genitourinary: Negative for pelvic pain, urgency, vaginal bleeding, vaginal discharge and vaginal pain  Neurological: Negative for headaches  Historical Information   Past Medical History:   Diagnosis Date    Elevated blood pressure reading     had increase BP with last pregnancy , now normal readings    Varicella     childhood disease around 4years old      Past Surgical History:   Procedure Laterality Date     SECTION      ME  DELIVERY ONLY N/A 2016    Procedure:  SECTION ();   Surgeon: Cathy Neumann MD;  Location: [facility] ;  Service: Obstetrics     Social History   History Alcohol Use No     History   Drug Use No     History   Smoking Status    Never Smoker   Smokeless Tobacco    Never Used     Family History: non-contributory    Meds/Allergies    {  Prescriptions Prior to Admission   Medication    Prenatal Vit-Fe Fumarate-FA (PRENATAL VITAMIN PO)        Allergies   Allergen Reactions    Latex Hives       OBJECTIVE:    Vitals:   /75   Temp 98 3 °F (36 8 °C) (Oral)   Resp 16   Ht 5' 4" (1 626 m)   Wt 79 4 kg (175 lb)   LMP 07/09/2017   BMI 30 04 kg/m²   Body mass index is 30 04 kg/m²  Physical Exam   Constitutional: She is oriented to person, place, and time  She appears well-developed and well-nourished  Cardiovascular: Normal rate and regular rhythm  Pulmonary/Chest: Effort normal and breath sounds normal    Abdominal: Bowel sounds are normal  There is no tenderness  There is no rebound and no guarding  Musculoskeletal: Normal range of motion  She exhibits no tenderness  Neurological: She is alert and oriented to person, place, and time  Psychiatric: She has a normal mood and affect  Nursing note and vitals reviewed  SVE: Dilation: 5  Effacement (%): 80  Station: -2  Method: Manual  OB Examiner: Cherri Phoenix    FHT:  Baseline Rate: 125 bpm (125-130)  Variability: Moderate 6-25 bpm  Accelerations: 15 x 15 or greater, At variable times  Decelerations: Variable, Episodic  TOCO:   Contraction Frequency (minutes): irregular (difficult to  ctx pattern, pt moving)  Contraction Quality: Moderate      Prenatal Labs:   Blood type: AB positive  Antigen Screen: negative  Rubella: Immune  HIV: Negative  RPR: NR  Hep B: negative  Diabetes Screen: 113  GBS: negative      Invasive Devices     Peripheral Intravenous Line            Peripheral IV 04/10/18 Right Wrist less than 1 day              Assessment/Plan     ASSESSMENT:   IUP at 37w2d weeks gestation admitted for labor      PLAN:   1) Admit   2) CBC, RPR, Blood Type   3) Analgesia and/or epidural at patient request   4) Anticipate    5) Will AROM once patient is in room and comfortable after epidural    6) D/W Dr Pasquale Coello        This patient will be an INPATIENT  and I certify the anticipated length of stay is >2 Midnights        Chandler Barboza MD  4/10/2018  4:12 PM

## 2018-04-10 NOTE — TELEPHONE ENCOUNTER
Patient called with possible labor symptoms  Was seen on L&D last night, and was not found to be in active labor  States she had some bleeding this morning  Contractions range from 5-10 minutes apart, and are becoming stronger and lasting about 45 seconds  Per Dr Alondra Fine, patient to return to L&D for recheck

## 2018-04-10 NOTE — ANESTHESIA PROCEDURE NOTES
Epidural Block    Patient location during procedure: OB  Start time: 4/10/2018 5:00 PM  Reason for block: procedure for pain and at surgeon's request  Staffing  Anesthesiologist: Fantasma Bell  Performed: anesthesiologist   Preanesthetic Checklist  Completed: patient identified, site marked, surgical consent, pre-op evaluation, timeout performed, IV checked, risks and benefits discussed and monitors and equipment checked  Epidural  Patient position: sitting  Prep: ChloraPrep  Patient monitoring: cardiac monitor and frequent blood pressure checks  Approach: midline  Location: lumbar (1-5)  Injection technique: DEBBIE saline  Needle  Needle type: Tuohy   Needle gauge: 18 G  Catheter type: side hole  Catheter size: 20 G  Catheter at skin depth: 10 cm  Test dose: negative and lidocaine 1 5% with epinephrine 1-to-200,000negative aspiration for CSF, negative aspiration for heme and no paresthesia on injection  patient tolerated the procedure well with no immediate complications  Additional Notes  One attempt, easy

## 2018-04-10 NOTE — PLAN OF CARE
BIRTH - VAGINAL/ SECTION     Fetal and maternal status remain reassuring during the birth process [de-identified]     Emotionally satisfying birthing experience for mother/fetus [address] Discharge to home or other facility with appropriate resources Progressing        INFECTION - ADULT     Absence or prevention of progression during hospitalization Progressing        Knowledge Deficit     Verbalizes understanding of labor plan Progressing     Patient/family/caregiver demonstrates understanding of disease process, treatment plan, medications, and discharge instructions Progressing        Labor & Delivery     Manages discomfort Progressing     Patient vital signs are stable Progressing        PAIN - ADULT     Verbalizes/displays adequate comfort level or baseline comfort level Progressing        Potential for Falls     Patient will remain free of falls Progressing        SAFETY ADULT     Maintain or return to baseline ADL function Progressing     Maintain or return mobility status to optimal level Progressing

## 2018-04-10 NOTE — ANESTHESIA PREPROCEDURE EVALUATION
Review of Systems/Medical History  Patient summary reviewed  Chart reviewed  No history of anesthetic complications     Cardiovascular  Negative cardio ROS    Pulmonary  Negative pulmonary ROS        GI/Hepatic            Endo/Other     GYN       Hematology   Musculoskeletal       Neurology   Psychology           Physical Exam    Airway    Mallampati score: II  TM Distance: >3 FB  Neck ROM: full     Dental       Cardiovascular  Comment: Negative ROS,     Pulmonary      Other Findings        Anesthesia Plan  ASA Score- 2     Anesthesia Type- epidural with ASA Monitors  Additional Monitors:   Airway Plan:         Plan Factors-    Induction-     Postoperative Plan-     Informed Consent- Anesthetic plan and risks discussed with patient

## 2018-04-10 NOTE — PLAN OF CARE
BIRTH - VAGINAL/ SECTION     Fetal and maternal status remain reassuring during the birth process Completed     Emotionally satisfying birthing experience for mother/fetus Completed        Knowledge Deficit     Verbalizes understanding of labor plan Completed        Labor & Delivery     Manages discomfort Completed     Patient vital signs are stable Completed          DISCHARGE PLANNING     Discharge to home or other facility with appropriate resources Progressing        INFECTION - ADULT     Absence or prevention of progression during hospitalization Progressing        Knowledge Deficit     Patient/family/caregiver demonstrates understanding of disease process, treatment plan, medications, and discharge instructions Progressing        PAIN - ADULT     Verbalizes/displays adequate comfort level or baseline comfort level Progressing        POSTPARTUM     Experiences normal postpartum course Progressing     Appropriate maternal -  bonding Progressing     Establishment of infant feeding pattern Progressing     Incision(s), wounds(s) or drain site(s) healing without S/S of infection Progressing        Potential for Falls     Patient will remain free of falls Progressing        SAFETY ADULT     Maintain or return to baseline ADL function Progressing     Maintain or return mobility status to optimal level Progressing

## 2018-04-11 LAB — RPR SER QL: NORMAL

## 2018-04-11 PROCEDURE — 99024 POSTOP FOLLOW-UP VISIT: CPT | Performed by: OBSTETRICS & GYNECOLOGY

## 2018-04-11 RX ADMIN — IBUPROFEN 600 MG: 600 TABLET ORAL at 15:40

## 2018-04-11 RX ADMIN — DOCUSATE SODIUM 100 MG: 100 CAPSULE, LIQUID FILLED ORAL at 10:14

## 2018-04-11 RX ADMIN — DOCUSATE SODIUM 100 MG: 100 CAPSULE, LIQUID FILLED ORAL at 18:48

## 2018-04-11 NOTE — SOCIAL WORK
Consults for " with suspected Down's Syndrome after delivery" and "Baby tianna has Trisomy 21 and needs support services information/guidance and set up "    Met with pt ([phone]) and FOB/ Robert Whatley ([phone]) to introduce CM services and discuss needs  Pt and FOB report they are doing ok and trying to process all this new information at this time  Pt and FOB report baby rell Long Patient is 2nd child for couple who live together in [facility] home with their 13 month old daughter  Pt reports having all baby supplies needed, will breast feed and has pump at home already, and family has cars for transportation as needed  Ped is [facility] peds  Provided pt and FOB with NICU resource packet as it includes medicaid, social security, WIC, and early intervention info, along with CM contact info  Also provided info packet from [facility] [address] [place] [address]  Info reviewed and all questions answered  Encouraged pt and FOB to follow up with pediatrician as needed for referrals and to contact CM if any other questions  Also informed pt and FOB of online support communities through [facility] [address] [place] [address]  Pt and FOB confirmed they have internet access and want to obtain as much info/resources as possible  Pt and FOB verbalized understanding of all info provided and deny any other CM needs at this time  No other CM needs noted at this time

## 2018-04-11 NOTE — DISCHARGE SUMMARY
Discharge Summary - Leonel Lion 33 y o  female MRN: [id number]    Unit/Bed#: -01 Encounter: [id number]    Admission Date: 4/10/2018     Discharge Date: 2018    Admitting Diagnosis:   1  Pregnancy at 37w2d  2  Spontaneous labor  3  Prior  section x1  4  TOLAC    Discharge Diagnosis: same, delivered    Procedures: vacuum, outlet    Attending: Suzanne Beverly MD    Hospital Course: Leonel Lion is a 33 y o   at 37w2d wks who was initially admitted for spontaneous labor, TOLAC  She progressed well on her own, received an epidural for analgesia, was AROM'd clear fluid, and pushed well  During pushing, fetal heart tones were category II with deep variable decelerations  Recommendation for vacuum delivery for suspicion of fetal compromise, patient agreeable  She delivered a viable male  on 4/10/2018 at 1817  Weight 6lbs 4oz via vacuum, outlet  Apgars were 8 (1 min) and 9 (5 min)   was transferred to  nursery  Patient tolerated the procedure well and was transferred to recovery in stable condition  Of note, upon examination of ,  showed clinical features suspicious for possible Down's syndrome  On day of discharge, she was ambulating and able to reasonably perform all ADLs  She was voiding and had appropriate bowel function  Pain was well controlled  She was discharged home on post-partum day #2 without complications  Patient was instructed to follow up with her OB as an outpatient and was given appropriate warnings to call provider if she develops signs of infection or uncontrolled pain  Complications: none apparent    Condition at discharge: stable     Discharge instructions/Information to patient and family:   See after visit summary for information provided to patient and family  Provisions for Follow-Up Care:  See after visit summary for information related to follow-up care and any pertinent home health orders        Disposition: Home    Planned Readmission: Terri    Ventura Ford  4/12/2018  6:39 AM

## 2018-04-11 NOTE — PLAN OF CARE
DISCHARGE PLANNING     Discharge to home or other facility with appropriate resources Progressing        INFECTION - ADULT     Absence or prevention of progression during hospitalization Progressing        Knowledge Deficit     Patient/family/caregiver demonstrates understanding of disease process, treatment plan, medications, and discharge instructions Progressing        PAIN - ADULT     Verbalizes/displays adequate comfort level or baseline comfort level Progressing        POSTPARTUM     Experiences normal postpartum course Progressing     Appropriate maternal -  bonding Progressing     Establishment of infant feeding pattern Progressing     Incision(s), wounds(s) or drain site(s) healing without S/S of infection Progressing        Potential for Falls     Patient will remain free of falls Progressing        SAFETY ADULT     Maintain or return to baseline ADL function Progressing     Maintain or return mobility status to optimal level Progressing

## 2018-04-11 NOTE — PROGRESS NOTES
Progress Note - OB/GYN   Sony Pretty 33 y o  female MRN: [id number]  Unit/Bed#: -01 Encounter: [id number]    Sony Pretty is a patient of Dr Joe Jevon:  Post partum day #1 s/p VAVD/, stable, and doing well    Plan:  1   with suspected Down's syndrome  2  Continue routine post partum care   - Encourage ambulation   - Encourage breastfeeding  3  Continue current meds    - See list below   - Pain well controlled  4  Disposition   - Stable, vitals WNL   - Anticipate discharge home tomorrow      Subjective/Objective     Chief Complaint:     Post partum day 1 from a VAVD with no complaints today    Subjective:   Pain: no  Tolerating Oral Intake: yes  Voiding: yes  Flatus: yes  Bowel Movement: no  Ambulating: yes  Breastfeeding: Bottle feeding  Chest Pain: no  Shortness of Breath: no  Leg Pain/Discomfort: no  Lochia: minimal    Objective:   Vitals: /62 (BP Location: Right arm)   Pulse 90   Temp 98 1 °F (36 7 °C) (Oral)   Resp 18   Ht 5' 4" (1 626 m)   Wt 79 4 kg (175 lb)   LMP 2017   SpO2 96%   Breastfeeding?  Yes   BMI 30 04 kg/m²       Intake/Output Summary (Last 24 hours) at 18 0551  Last data filed at 18 0155   Gross per 24 hour   Intake          2179 17 ml   Output             1350 ml   Net           829 17 ml       Lab Results   Component Value Date    WBC 17 32 (H) 04/10/2018    HGB 13 5 04/10/2018    HCT 41 5 04/10/2018    MCV 88 04/10/2018     04/10/2018       Meds/Allergies   Current Facility-Administered Medications   Medication Dose Route Frequency    acetaminophen (TYLENOL) tablet 650 mg  650 mg Oral Q6H PRN    benzocaine-menthol-lanolin-aloe (DERMOPLAST) 20-0 5 % topical spray 1 application  1 application Topical 4x Daily PRN    bisacodyl (DULCOLAX) rectal suppository 10 mg  10 mg Rectal Daily PRN    diphenhydrAMINE (BENADRYL) tablet 25 mg  25 mg Oral Q6H PRN    docusate sodium (COLACE) capsule 100 mg  100 mg Oral BID    hydrocortisone 1 % cream 1 application  1 application Topical PRN    ibuprofen (MOTRIN) tablet 600 mg  600 mg Oral Q6H PRN    lactated ringers infusion  125 mL/hr Intravenous Continuous    metoclopramide (REGLAN) injection 10 mg  10 mg Intravenous Q6H PRN    ondansetron (ZOFRAN) injection 4 mg  4 mg Intravenous Q6H PRN    oxyCODONE-acetaminophen (PERCOCET) 5-325 mg per tablet 1 tablet  1 tablet Oral Q4H PRN    oxyCODONE-acetaminophen (PERCOCET) 5-325 mg per tablet 2 tablet  2 tablet Oral Q4H PRN    ropivacaine 0 2% PCEA   Epidural Continuous    senna (SENOKOT) tablet 8 6 mg  1 tablet Oral Daily    simethicone (MYLICON) chewable tablet 80 mg  80 mg Oral 4x Daily PRN    witch hazel-glycerin (TUCKS) topical pad 1 pad  1 pad Topical Q4H PRN       Physical Exam:  General: in no apparent distress and well developed and well nourished  Cardiovascular: Cor RRR  Lungs: clear to auscultation bilaterally  Abdomen: abdomen is soft without significant tenderness, masses, organomegaly or guarding  Fundus: Firm and non-tender, 1 cm above the umbilicus  Lower extremeties: nontender    Lona Rangel DO  PGY-1 OB/GYN   4/11/2018 5:51 AM

## 2018-04-11 NOTE — DISCHARGE INSTRUCTIONS
Vaginal Delivery   WHAT YOU NEED TO KNOW:   A vaginal delivery occurs when your baby is born through your vagina (birth canal)  DISCHARGE INSTRUCTIONS:   Seek care immediately if:   · Your leg feels warm, tender, and painful  It may look swollen and red  · You have a fever  · You are urinating very little, or not at all  · You have heavy vaginal bleeding that fills 1 or more sanitary pads in 1 hour  · You feel weak, dizzy, or faint  Contact your healthcare provider if:   · Your abdominal or perineal pain does not go away, or gets worse  · You feel depressed  · You have questions or concerns about your condition or care  Medicines:  · NSAIDs , such as ibuprofen, help decrease swelling, pain, and fever  This medicine is available with or without a doctor's order  NSAIDs can cause stomach bleeding or kidney problems in certain people  If you take blood thinner medicine, always ask your healthcare provider if NSAIDs are safe for you  Always read the medicine label and follow directions  · Stool softeners  make it easier for you to have a bowel movement  You may need this medicine to treat or prevent constipation  · Take your medicine as directed  Contact your healthcare provider if you think your medicine is not helping or if you have side effects  Tell him or her if you are allergic to any medicine  Keep a list of the medicines, vitamins, and herbs you take  Include the amounts, and when and why you take them  Bring the list or the pill bottles to follow-up visits  Carry your medicine list with you in case of an emergency  Follow up with your healthcare provider:  Most women need to return 6 weeks after a vaginal delivery  Ask your healthcare provider how to care for your wounds or stitches, if you have them  Write down your questions so you remember to ask them during your visits  Activity:  Rest as much as possible  Try to keep all activities short   You may be able to do some exercise soon after you have your baby  Talk with your healthcare provider before you start exercising  If you work outside the home, ask when you can return to your job  Kegel exercises:  Kegel exercises may help your vaginal and rectal muscles heal faster  You can do Kegel exercises by tightening and relaxing the muscles around your vagina  Kegel exercises help make the muscles stronger  Breast care:  When your milk comes in, your breasts may feel full and hard  Ask how to care for your breasts, even if you are not breastfeeding  Constipation:  You may have constipation for a period of time after you have your baby  Do not try to push the bowel movement out if it is too hard  High-fiber foods and extra liquids can help you prevent constipation  Examples of high-fiber foods are fruit and bran  Prune juice and water are good liquids to drink  You may also be told to take over-the-counter fiber and stool softener medicines  Take these items as directed  Ask how to prevent or treat hemorrhoids  Perineum care: Your perineum is the area between your vagina and anus  Keep the area clean and dry  This will help it heal and prevent infection  Wash the area gently with soap and water when you bathe or shower  Rinse your perineum with warm water after you urinate or have a bowel movement  Your healthcare provider may suggest you use a warm sitz bath to help decrease pain  To take a sitz bath, fill a bathtub with 4 to 6 inches of warm water  You may also use a sitz bath pan that fits inside the toilet  Sit in the sitz bath for 20 minutes  Do this 2 to 3 times a day, or as directed  The warm water can help decrease pain and swelling  Vaginal discharge: You will have vaginal discharge, called lochia, after your delivery  The lochia is red or dark brown with clots for 1 to 3 days after the birth  The amount will decrease and turn pale pink or brown for 3 to 10 days  It will turn white or yellow on the 10th or 14th day  Lochia is usually gone within 3 weeks  Use a sanitary pad rather than a tampon to prevent a vaginal infection  You will have lochia for up to 3 weeks after your baby is born  Monthly periods: Your period may start again within 7 to 9 weeks after your baby is born  If you are breastfeeding, it may take longer for your period to start again  You can still get pregnant again even though you do not have your monthly period  Talk with your healthcare provider about a birth control method if you do not want to get pregnant  Mood changes: Many new mothers have some kind of mood changes after delivery  Some of these changes occur because of lack of sleep, hormone changes, and caring for a new baby  Some mood changes can be more serious, such as postpartum depression  Talk with your healthcare provider if you feel unable to care for yourself or your baby  Sexual activity:  Do not have sex until your healthcare provider says it is okay  You may notice you have a decreased desire for sex, or sex may be painful  You may need to use a vaginal lubricant (gel) to help make sex more comfortable  © 2017 [facility] Information is for End User's use only and may not be sold, redistributed or otherwise used for commercial purposes  All illustrations and images included in CareNotes® are the copyrighted property of A D A M [employer] Inc  or [employer]  The above information is an  only  It is not intended as medical advice for individual conditions or treatments  Talk to your doctor, nurse or pharmacist before following any medical regimen to see if it is safe and effective for you  Breastfeeding Your Baby   WHAT YOU NEED TO KNOW:   Breastfeeding is good for your baby and for you  Experts recommend that you feed your baby only breast milk until he is 7 months old  Breastfeeding for the first 6 months can decrease your baby's risk for illnesses   These illnesses include respiratory (lung) infections, allergies, asthma, and stomach problems  Experts also recommend that you continue to breastfeed your baby until he is at least 13 months old after he starts eating solid foods  You can breastfeed longer if you choose to  DISCHARGE INSTRUCTIONS:   Seek care immediately if:   · You are very depressed or have thoughts of hurting your baby  Contact your healthcare provider if:   · Your baby is feeding fewer than 8 times each day  · Your baby is 3or more days old and has fewer than 6 wet diapers each day  · Your baby is 3or more days old and has fewer than 3 to 4 bowel movements each day  · You have nipple pain while feeding or between feedings  · Your nipples look red, dry, cracked, or they have scabs on them  · You feel a lump in your breast that feels tender  · Your breasts become painful and swollen  · Your baby becomes jaundiced (skin and whites of the eyes are turning yellow)  Follow up with your healthcare provider as directed:  Write down your questions so you remember to ask them during your visits  Ways that breastfeeding is good for your baby:   · Breast milk gives your baby the best nutrition  Your breasts will first produce colostrum  Colostrum is rich in antibodies (proteins that protect your baby's immune system)  Breast milk starts to replace colostrum 2 to 4 days after your baby's birth  Breast milk contains the protein, fat, sugar, vitamins, and minerals that your baby needs to grow  Your baby will need a vitamin D supplement soon after birth  Talk to your healthcare provider about the amount and type of vitamin D supplement that is best for your baby  · Breast milk is safe and easy for your baby to digest   Breast milk does not need to be prepared  · Breast milk helps your baby develop a strong immune system  The immune system helps fight off infection  Breast milk has antibodies and other substances that help protect your baby's immune system   This helps protect your baby from allergies and infections   babies have a lower risk for allergy problems such as eczema  Eczema causes red, itchy, swollen skin  Breast milk can also help protect your baby against ear infections, diarrhea, and lung infections  · Breast milk decreases your baby's risk for certain medical conditions   babies may have a lower risk for sudden infant death syndrome (SIDS)  They also have a lower risk of becoming obese or developing diabetes, high cholesterol, or high blood pressure  Ways that breastfeeding is good for you:   · Breastfeeding can help you recover faster after delivery  Breastfeeding right after the delivery of your baby helps stop bleeding from your uterus  It also helps shrink your uterus back to the size it was before your pregnancy  You may be able to lose weight by following a healthy diet if you are breastfeeding  This can happen because of the extra calories your body needs to support breastfeeding  · Breastfeeding may decrease your risk for postpartum depression and certain diseases  Breastfeeding may lower your risk of postpartum depression, and breast and ovarian cancer  Breastfeeding also decreases your risk of type 2 diabetes if you did not have gestational diabetes during pregnancy  Breastfeeding can make your bones stronger  This can help prevent osteoporosis and fractures later in life  · Breastfeeding has other benefits  Breastfeeding is a special experience that can help you bond with your baby  Breastfeeding can save you time and money because you do not have to buy and prepare milk  How often to breastfeed your baby:   · Your baby may let you know when he is ready to breastfeed  He may be more awake and may be moving more  He may put his hands up to his mouth  Crying is normally a late sign that your baby is hungry  You should breastfeed your baby between 8 and 12 times each day   This includes waking to breastfeed him during the night  If your baby is sleeping and it is time to feed, lightly rub your finger across his lips  · Your baby may breastfeed for about 15 to 20 minutes on each breast  Some babies breastfeed for a shorter or longer amount of time  Let your baby feed from each breast until he stops on his own  Breastfeeding a premature baby:   · Some premature babies are not able to eat on their own and need to be fed through a tube  Even if your premature baby cannot feed directly from your breast, he can still be given breast milk  It can be expressed or pumped and then fed to your baby  As your baby grows and develops, he may learn to breastfeed  Express milk after your baby is born so that he can receive antibodies from colostrum  When you express milk from your breasts, you stimulate them to make more milk  · Breast milk is especially good for premature babies who have a very low birthweight  Premature babies are at risk for medical problems because their immune system is not fully formed  The antibodies and nutrients found in colostrum and breast milk can help to protect a premature baby against medical problems  Breast milk helps your baby's eyes, brain, and digestive system develop  When not to breastfeed:   · Your baby has galactosemia, a condition that keeps his body from breaking down galactose (a form of sugar found in breast milk)  · You have active tuberculosis (TB) that has not been treated for at least 2 weeks  · You have HIV or AIDS  · You use illegal drugs, or you drink alcohol often or in large amounts  Prevent breastfeeding problems:   · Work with your healthcare provider or lactation specialist   Write down questions or concerns about breastfeeding to ask during your appointments  · Ask about your medicines  Talk to your healthcare provider before you take any medicines  This includes all prescription and over-the-counter medicines   Some medicines may decrease the amount of breast milk you make  Other medicines may enter your breast milk and affect your baby  · Do not smoke  Nicotine goes into your breast milk  Your baby is exposed to these chemicals through breastfeeding and inhaling cigarette smoke  Smoking can also decrease the amount of breast milk you make  Do not use e-cigarettes or smokeless tobacco in place of cigarettes or to help you quit  They still contain nicotine  Ask your healthcare provider for information if you currently smoke and need help quitting  · Limit or do not drink alcohol  Alcohol passes from your breast milk to your baby  If you choose to drink alcohol, breastfeed your baby before you drink alcohol  Do not breastfeed your baby for at least 2 hours after you have 1 drink  One drink of alcohol is 12 ounces of beer, 4 ounces of wine, or 1½ ounces of liquor  Care for yourself while you are breastfeeding:   · Get enough rest   You may have a hard time resting while you are caring for your   Ask for help from family and friends so that you can get the rest you need  · Eat healthy foods  A healthy meal plan can keep you healthy and support milk production  You need extra calories each day while you are breastfeeding  Your healthcare provider may also have you take vitamins, including pregnancy vitamins and vitamin D  Talk with him before you take any vitamins or supplements  · Manage stress  Increased stress can decrease the amount of breast milk you make  Relaxation can help decrease your stress and help you feel better  Deep breathing, meditating, and listening to music also may help you cope with stress  Talk to your healthcare provider about other ways to manage stress    For support and more information about breastfeeding your baby:   · [employer]  [address] , [address]  Phone: 7- 872 - 241-1322  Web Address: http://[employer]/  · [employer]  [address]  , [place]  Phone: 3- 169 - 384-3415  Phone: 8- 295 - 753-1826  Web Address: http://[employer] nataliya mann/  [place]  2017 [facility] Information is for End User's use only and may not be sold, redistributed or otherwise used for commercial purposes  All illustrations and images included in CareNotes® are the copyrighted property of A D A M , Inc  or [employer]  The above information is an  only  It is not intended as medical advice for individual conditions or treatments  Talk to your doctor, nurse or pharmacist before following any medical regimen to see if it is safe and effective for you  Postpartum Bleeding   WHAT YOU NEED TO KNOW:   Postpartum bleeding is vaginal bleeding after childbirth  This bleeding is normal, whether your baby was born vaginally or by   It contains blood and the tissue that lined the inside of your uterus when you were pregnant  DISCHARGE INSTRUCTIONS:   What to expect with postpartum bleeding:  Postpartum bleeding usually lasts at least 10 days, and may last longer than 6 weeks  Your bleeding may range from light (barely staining a pad) to heavy (soaking a pad in 1 hour)  Usually, you have heavier bleeding right after childbirth, which slows over the next few weeks until it stops  The bleeding is red or dark brown with clots for the first 1 to 3 days  It then turns pink for several days, and then becomes a white or yellow discharge until it ends  Follow up with your obstetrician as directed:  Do not have sex until your obstetrician says it is okay  Write down your questions so you remember to ask them during your visits  Contact your healthcare provider or obstetrician if:   · Your bleeding increases, or you have heavy bleeding that soaks a pad in 1 hour for 2 hours in a row  · You pass large blood clots      · You are breathing faster than normal, or your heart is beating faster than normal     · You are urinating less than usual, or not at all  · You feel dizzy  · You have questions or concerns about your condition or care  Seek immediate care or call 911 if:   · You are suddenly short of breath and feel lightheaded  · You have sudden chest pain  © 2017 [address] Information is for End User's use only and may not be sold, redistributed or otherwise used for commercial purposes  All illustrations and images included in CareNotes® are the copyrighted property of [employer] M [employer] Inc  or [employer] Thomas  The above information is an  only  It is not intended as medical advice for individual conditions or treatments  Talk to your doctor, nurse or pharmacist before following any medical regimen to see if it is safe and effective for you

## 2018-04-11 NOTE — L&D DELIVERY NOTE
DELIVERY NOTE  Javier Valladares 33 y o  female MRN: [id number]  Unit/Bed#: -01 Encounter: [id number]    Obstetrician:   Dr Lee Ann Madsen    Assistant: Dr Ej Whitten    Pre-Delivery Diagnosis: Term pregnancy  Spontaneous labor  Single fetus  TOLAC  Declined genetic screening    Post-Delivery Diagnosis: Same as above - Delivered     with suspect Trisomy 21    Procedure: Outlet vacuum assisted vaginal delivery,     Delivery Date and Time: 04/10/2018 @ 1817  Arterial blood gas: pH 7 181, base excess -5 2  Venous blood gas: pH 7 251, base excess -5 0    Indications for instrumental delivery: Suspicion of immediate or potential fetal compromise    Estimated Blood Loss:  480CJ           Complications:  None    Brief description of labor:  Please see additional notes for details of the labor course  Javier Valladares is a 33 y o   at 37w2d wks who was initially admitted for spontaneous labor, TOLAC  She progressed well on her own, received an epidural for analgesia, was AROM'd clear fluid, and pushed well  During pushing, fetal heart tones were category II with deep variable decelerations  Recommendation for vacuum delivery for suspicion of fetal compromise, patient agreeable  Description of Procedure:  Due to Suspicion of immediate or potential fetal compromise, vacuum assisted delivery was recommended after pushing for 5 minutes  The pt was consented verbally and agreed  NICU was notified  The fetus was noted to be in the right occiput anterior position, +4 station  EFW [id number]  Bladder was drained  Anesthesia was adequate  The Kiwi cup was applied to the fetal median flexion point and centered over the sagittal sutures approx 3 cm anterior to the posterior fontanelle  There was no vaginal or cervical tissue at the margin  With the start of the next contraction, a vacuum seal was established to a max of 600mmHg and gentle traction was applied   Advancement of the fetal head was noted with gentle traction on the suction device  The vacuum was applied at 1815hours  1  2 pulls were performed  2  0 number of pop-offs occurred  3  0 number of re applications were performed  The vacuum was released upon  of the fetal head and delivery was performed thereafter  There was a tight nuchal x1 that the  was delivered through  With the assistance of maternal expulsive efforts and downward traction of fetal head, the anterior shoulder was delivered without difficulty, followed by the remainder of the infant's body  The infant was placed on the mother's abdomen and the umbilical cord was doubly clamped and cut after delayed cord clamping  The awaiting nurse resuscitator was there for further evaluation of the infant  Umbilical cord blood and umbilical artery and venous gases were collected  IV pitocin was started during the 3rd stage to control bleeding  Placenta was delivered with fundal massage and gentle traction on the cord with active management of the third stage of labor  Placenta delivered intact with a peripherally inserted 3-vessel cord  A bimanual was performed and the uterus was noted to be firm and contracted  Inspection of the perineum, vagina, labia, and urethra revealed left labial laceration  The laceration was repaired with 3-0 Vicryl suture  Good hemostasis was noted  At the conclusion of the delivery, all needle, sponge, and instrument counts were noted to be correct  The patient tolerated the procedure well and was allowed to recover in labor and delivery room  Dr Ladonna Stephenson MD was present

## 2018-04-12 VITALS
BODY MASS INDEX: 29.88 KG/M2 | DIASTOLIC BLOOD PRESSURE: 75 MMHG | RESPIRATION RATE: 18 BRPM | HEIGHT: 64 IN | WEIGHT: 175 LBS | OXYGEN SATURATION: 96 % | TEMPERATURE: 98.5 F | HEART RATE: 84 BPM | SYSTOLIC BLOOD PRESSURE: 135 MMHG

## 2018-04-12 PROCEDURE — 99024 POSTOP FOLLOW-UP VISIT: CPT | Performed by: OBSTETRICS & GYNECOLOGY

## 2018-04-12 RX ORDER — DIAPER,BRIEF,INFANT-TODD,DISP
1 EACH MISCELLANEOUS AS NEEDED
Qty: 30 G | Refills: 0
Start: 2018-04-12 | End: 2018-05-23

## 2018-04-12 RX ORDER — IBUPROFEN 600 MG/1
600 TABLET ORAL EVERY 6 HOURS PRN
Qty: 30 TABLET | Refills: 0
Start: 2018-04-12 | End: 2018-05-23

## 2018-04-12 RX ORDER — ACETAMINOPHEN 325 MG/1
325 TABLET ORAL EVERY 4 HOURS PRN
Qty: 30 TABLET | Refills: 0
Start: 2018-04-12 | End: 2018-05-23

## 2018-04-12 RX ORDER — DOCUSATE SODIUM 100 MG/1
100 CAPSULE, LIQUID FILLED ORAL 2 TIMES DAILY
Qty: 10 CAPSULE | Refills: 0
Start: 2018-04-12 | End: 2018-05-23

## 2018-04-12 RX ADMIN — DOCUSATE SODIUM 100 MG: 100 CAPSULE, LIQUID FILLED ORAL at 09:58

## 2018-04-12 NOTE — PROGRESS NOTES
Progress Note - OB/GYN   Aidan Koehler 33 y o  female MRN: [id number]  Unit/Bed#: -01 Encounter: [id number]    Aidan Koehler is a patient of Dr Silva Doing:  Post partum day #2 s/p VAVD/, stable, and doing well    Plan:  1   with suspected Down's syndrome   -CM consult completed, no other needs at this time  2  Continue routine post partum care   - Encourage ambulation   - Encourage breastfeeding  3  Continue current meds    - See list below   - Pain well controlled  4  Disposition   - Stable, vitals WNL   - Anticipate discharge home today      Subjective/Objective     Chief Complaint:     Post partum day 1 from a VAVD with no complaints today    Subjective:   Pain: no  Tolerating Oral Intake: yes  Voiding: yes  Flatus: yes  Bowel Movement: no  Ambulating: yes  Breastfeeding: Bottle feeding  Chest Pain: no  Shortness of Breath: no  Leg Pain/Discomfort: no  Lochia: minimal    Objective:   Vitals: /69 (BP Location: Right arm)   Pulse 84   Temp 98 °F (36 7 °C) (Oral)   Resp 18   Ht 5' 4" (1 626 m)   Wt 79 4 kg (175 lb)   LMP 2017   SpO2 96%   Breastfeeding?  Yes   BMI 30 04 kg/m²     No intake or output data in the 24 hours ending 18 0625    Lab Results   Component Value Date    WBC 17 32 (H) 04/10/2018    HGB 13 5 04/10/2018    HCT 41 5 04/10/2018    MCV 88 04/10/2018     04/10/2018       Meds/Allergies   Current Facility-Administered Medications   Medication Dose Route Frequency    acetaminophen (TYLENOL) tablet 650 mg  650 mg Oral Q6H PRN    benzocaine-menthol-lanolin-aloe (DERMOPLAST) 20-0 5 % topical spray 1 application  1 application Topical 4x Daily PRN    bisacodyl (DULCOLAX) rectal suppository 10 mg  10 mg Rectal Daily PRN    diphenhydrAMINE (BENADRYL) tablet 25 mg  25 mg Oral Q6H PRN    docusate sodium (COLACE) capsule 100 mg  100 mg Oral BID    hydrocortisone 1 % cream 1 application  1 application Topical PRN    ibuprofen (MOTRIN) tablet 600 mg  600 mg Oral Q6H PRN    lactated ringers infusion  125 mL/hr Intravenous Continuous    metoclopramide (REGLAN) injection 10 mg  10 mg Intravenous Q6H PRN    ondansetron (ZOFRAN) injection 4 mg  4 mg Intravenous Q6H PRN    oxyCODONE-acetaminophen (PERCOCET) 5-325 mg per tablet 1 tablet  1 tablet Oral Q4H PRN    oxyCODONE-acetaminophen (PERCOCET) 5-325 mg per tablet 2 tablet  2 tablet Oral Q4H PRN    ropivacaine 0 2% PCEA   Epidural Continuous    senna (SENOKOT) tablet 8 6 mg  1 tablet Oral Daily    simethicone (MYLICON) chewable tablet 80 mg  80 mg Oral 4x Daily PRN    witch hazel-glycerin (TUCKS) topical pad 1 pad  1 pad Topical Q4H PRN       Physical Exam:  General: in no apparent distress and well developed and well nourished  Cardiovascular: Cor RRR  Lungs: clear to auscultation bilaterally  Abdomen: abdomen is soft without significant tenderness, masses, organomegaly or guarding  Fundus: Firm and non-tender,  at the umbilicus  Lower extremeties: nontender    Trisha Boss MD  PGY-1 OB/GYN   4/12/2018 6:25 AM

## 2018-04-12 NOTE — PLAN OF CARE
DISCHARGE PLANNING     Discharge to home or other facility with appropriate resources Progressing        DISCHARGE PLANNING - CARE MANAGEMENT     Discharge to post-acute care or home with appropriate resources Progressing        INFECTION - ADULT     Absence or prevention of progression during hospitalization Progressing        Knowledge Deficit     Patient/family/caregiver demonstrates understanding of disease process, treatment plan, medications, and discharge instructions Progressing        PAIN - ADULT     Verbalizes/displays adequate comfort level or baseline comfort level Progressing        POSTPARTUM     Experiences normal postpartum course Progressing     Appropriate maternal -  bonding Progressing     Establishment of infant feeding pattern Progressing     Incision(s), wounds(s) or drain site(s) healing without S/S of infection Progressing        Potential for Falls     Patient will remain free of falls Progressing        SAFETY ADULT     Maintain or return to baseline ADL function Progressing     Maintain or return mobility status to optimal level Progressing

## 2018-04-12 NOTE — PLAN OF CARE
DISCHARGE PLANNING     Discharge to home or other facility with appropriate resources Completed        DISCHARGE PLANNING - CARE MANAGEMENT     Discharge to post-acute care or home with appropriate resources Completed        INFECTION - ADULT     Absence or prevention of progression during hospitalization Completed        Knowledge Deficit     Patient/family/caregiver demonstrates understanding of disease process, treatment plan, medications, and discharge instructions Completed        PAIN - ADULT     Verbalizes/displays adequate comfort level or baseline comfort level Completed        POSTPARTUM     Experiences normal postpartum course Completed     Appropriate maternal -  bonding Completed     Establishment of infant feeding pattern Completed     Incision(s), wounds(s) or drain site(s) healing without S/S of infection Completed        Potential for Falls     Patient will remain free of falls Completed        SAFETY ADULT     Maintain or return to baseline ADL function Completed     Maintain or return mobility status to optimal level Completed

## 2018-04-13 ENCOUNTER — TRANSITIONAL CARE MANAGEMENT (OUTPATIENT)
Dept: FAMILY MEDICINE CLINIC | Facility: CLINIC | Age: 28
End: 2018-04-13

## 2018-04-23 LAB — PLACENTA IN STORAGE: NORMAL

## 2018-05-23 ENCOUNTER — POSTPARTUM VISIT (OUTPATIENT)
Dept: OBGYN CLINIC | Facility: CLINIC | Age: 28
End: 2018-05-23

## 2018-05-23 VITALS — SYSTOLIC BLOOD PRESSURE: 112 MMHG | DIASTOLIC BLOOD PRESSURE: 60 MMHG | BODY MASS INDEX: 27.22 KG/M2 | WEIGHT: 158.6 LBS

## 2018-05-23 DIAGNOSIS — O34.219 VAGINAL BIRTH AFTER CESAREAN (VBAC): ICD-10-CM

## 2018-05-23 PROCEDURE — 99024 POSTOP FOLLOW-UP VISIT: CPT | Performed by: OBSTETRICS & GYNECOLOGY

## 2018-05-23 PROCEDURE — G0145 SCR C/V CYTO,THINLAYER,RESCR: HCPCS | Performed by: OBSTETRICS & GYNECOLOGY

## 2018-05-23 NOTE — PROGRESS NOTES
OB POSTPARTUM VISIT PROGRESS NOTE  Date of Encounter: 2018    Haja Yungp    : 1990  (33 y o )  MR: [id number]    Subjective   Kve Horne is in for her postpartum visit  She is now [id number]  She delivered by normal spontaneous vaginal delivery  She's generally doing well, denies current pain or bleeding issues, and has no significant depression issues  She is pumping exclusively  We discussed all appropriate contraceptive options and she chooses None  Patient is doing well postpartum    She voiced no complaints at her visit today    She does not need any contraception at this time    Ayaan Rizo is doing well and having a cardiology follow-up in the near future    All questions were answered for her today's visit    She will be seen in 1 year for routine annual gynecologic medical examination    Pap smear was performed at today's visit      No visits with results within 1 Month(s) from this visit  Latest known visit with results is:   Admission on 04/10/2018, Discharged on 2018   Component Date Value    ABO Grouping 04/10/2018 AB     Rh Factor 04/10/2018 Positive     Antibody Screen 04/10/2018 Negative     Specimen Expiration Date 04/10/2018 [id number]     WBC 04/10/2018 17 32*    RBC 04/10/2018 4 72     Hemoglobin 04/10/2018 13 5     Hematocrit 04/10/2018 41 5     MCV 04/10/2018 88     MCH 04/10/2018 28 6     MCHC 04/10/2018 32 5     RDW 04/10/2018 14 4     Platelets  180     MPV 04/10/2018 10 4     RPR 04/10/2018 Non-Reactive     pH, Cord Kyree 04/10/2018 7  251     pCO2, Cord Kyree 04/10/2018 54 5*    pO2, Cord Kyree 04/10/2018 28 9     HCO3, Cord Kyree 04/10/2018 23 4     Base Exc, Cord Kyree 04/10/2018 -5 0*    O2 Cont, Cord Kyree 04/10/2018 19 1     O2 HGB,VENOUS CORD 04/10/2018 59 5     pH, Cord Art 04/10/2018 7 181*    pCO2, Cord Art 04/10/2018 70 9*    pO2, Cord Art 04/10/2018 22 3     HCO3, Cord Art 04/10/2018 25 9     Base Exc, Cord Art 04/10/2018 -5 2*    O2 Content, Cord Art 04/10/2018 11 5     O2 Hgb, Arterial Cord 04/10/2018 37 0     PLACENTA IN STORAGE 04/10/2018 Placenta Discarded        Objective   EXAM:  GENERAL: alert, well appearing, and in no distress  VITALS: Blood pressure 112/60, weight 71 9 kg (158 lb 9 6 oz), last menstrual period 2017, not currently breastfeeding  BMI: Body mass index is 27 22 kg/m²  NECK/THYROID: NORMAL  HEART: RRRLUNGS: Clear to auscultation  BACK: No CVAT  BREASTS: Bilaterally nontender, no masses or nipple discharge  ABDOMEN: Regular  EXTREMITIES: All normal   PELVIC:   VULVA: Nml EGBUS  VAGINA: Normal, no discharge  Introitus well healed, slightly tender  CERVIX: Normal, no discharge  UTERUS: Retroverted, NSSC, Mobile, NT    RIGHT ADNEXUM: Nontender, no mass  LEFT ADNEXUM: Nontender, no mass  RECTAL: Deferred      Assessment/Plan   Diagnoses and all orders for this visit:    Encounter for postpartum visit  -     Liquid-based pap, screening    Vaginal birth after  ()    Vacuum extractor delivery, delivered        Brodie Alvarenga MD

## 2018-05-23 NOTE — PATIENT INSTRUCTIONS
Topic:  Postpartum visit    Patient doing well as is the her baby    Patient voiced no complaints today    Pap smear was completed    Patient does not need any birth control at this time    Patient currently pumping    She will be seen in 1 year for her annual gynecologic medical examination    Patient will call should any problems issues or concerns arise during the interim

## 2018-05-29 LAB
LAB AP GYN PRIMARY INTERPRETATION: NORMAL
LAB AP LMP: NORMAL
Lab: NORMAL

## 2019-12-11 ENCOUNTER — ANNUAL EXAM (OUTPATIENT)
Dept: OBGYN CLINIC | Facility: CLINIC | Age: 29
End: 2019-12-11
Payer: COMMERCIAL

## 2019-12-11 VITALS
DIASTOLIC BLOOD PRESSURE: 60 MMHG | BODY MASS INDEX: 25.95 KG/M2 | SYSTOLIC BLOOD PRESSURE: 110 MMHG | WEIGHT: 152 LBS | HEIGHT: 64 IN

## 2019-12-11 DIAGNOSIS — Z30.011 ENCOUNTER FOR PRESCRIPTION OF ORAL CONTRACEPTIVES: ICD-10-CM

## 2019-12-11 DIAGNOSIS — Z01.419 PAP SMEAR, AS PART OF ROUTINE GYNECOLOGICAL EXAMINATION: ICD-10-CM

## 2019-12-11 DIAGNOSIS — Z01.419 ENCOUNTER FOR GYNECOLOGICAL EXAMINATION WITHOUT ABNORMAL FINDING: Primary | ICD-10-CM

## 2019-12-11 PROCEDURE — G0145 SCR C/V CYTO,THINLAYER,RESCR: HCPCS | Performed by: OBSTETRICS & GYNECOLOGY

## 2019-12-11 PROCEDURE — S0612 ANNUAL GYNECOLOGICAL EXAMINA: HCPCS | Performed by: OBSTETRICS & GYNECOLOGY

## 2019-12-11 RX ORDER — ACETAMINOPHEN AND CODEINE PHOSPHATE 120; 12 MG/5ML; MG/5ML
1 SOLUTION ORAL DAILY
Qty: 28 TABLET | Refills: 6 | Status: SHIPPED | OUTPATIENT
Start: 2019-12-11

## 2019-12-11 NOTE — PROGRESS NOTES
Assessment/Plan:    No problem-specific Assessment & Plan notes found for this encounter  Normal gynecological physical examination  Self-breast examination stressed  Discussed regular exercise, healthy diet, importance of vitamin D and calcium supplements  Discussed importance of sun block use during periods of prolonged sun exposure  Patient will be seen in 1 year for routine gynecologic and medical examination  Patient will call office for any problems, concerns, or issues which may arise during the interim  Diagnoses and all orders for this visit:    Encounter for gynecological examination without abnormal finding  -     Cancel: Thinprep Pap (Refl) HPV mRNA E6/E7  -     Liquid-based pap, screening  -     norethindrone (MICRONOR) 0 35 MG tablet; Take 1 tablet (0 35 mg total) by mouth daily    Pap smear, as part of routine gynecological examination  -     Liquid-based pap, screening    Encounter for prescription of oral contraceptives        Subjective:      Patient ID: Jimmy Lowe is a 30 y o  female  Patient is a 28-year-old female who presents today for annual gynecologic medical examination    Patient reports that she is having regular and normal periods  She denies any erratic spotting or bleeding  She also denies any significant cramping as well  Patient reports normal bowel and bladder habits    She denies any significant pelvic or abdominal pain    She also denies any headaches, chest pain, shortness of breath, fever, shakes or chills    She is not being followed for any significant medical problems at this time    Patient is currently breast feeding and desires to go on some form of contraceptive  I advised her that we will place her on a progesterone only pill Micronor at this time and we will keep her on that for the next several months until she stops nursing    Will see her in 4-6 months for re-evaluation at that time will place her on a combination pill as long as she has discontinued nursing  All questions were answered for her during today's visit  Patient will call for any problems, issues or concerns which may arise for her          The following portions of the patient's history were reviewed and updated as appropriate: allergies, current medications, past family history, past medical history, past social history, past surgical history and problem list     Review of Systems   Constitutional: Negative  Negative for appetite change, diaphoresis, fatigue, fever and unexpected weight change  HENT: Negative  Eyes: Negative  Respiratory: Negative  Cardiovascular: Negative  Gastrointestinal: Negative  Negative for abdominal pain, blood in stool, constipation, diarrhea, nausea and vomiting  Endocrine: Negative  Negative for cold intolerance and heat intolerance  Genitourinary: Negative  Negative for dysuria, frequency, hematuria, urgency, vaginal bleeding, vaginal discharge and vaginal pain  Musculoskeletal: Negative  Skin: Negative  Allergic/Immunologic: Negative  Neurological: Negative  Hematological: Negative  Negative for adenopathy  Psychiatric/Behavioral: Negative  Objective:      /60   Ht 5' 4" (1 626 m)   Wt 68 9 kg (152 lb)   LMP 12/03/2019   BMI 26 09 kg/m²          Physical Exam   Constitutional: She is oriented to person, place, and time  She appears well-developed and well-nourished  HENT:   Head: Normocephalic  Eyes: Pupils are equal, round, and reactive to light  Neck: Normal range of motion  Neck supple  Cardiovascular: Normal rate and regular rhythm  Pulmonary/Chest: Effort normal and breath sounds normal  Right breast exhibits no inverted nipple, no mass, no nipple discharge, no skin change and no tenderness  Left breast exhibits no inverted nipple, no mass, no nipple discharge, no skin change and no tenderness  Breasts are symmetrical    Abdominal: Soft   Normal appearance and bowel sounds are normal    Genitourinary: Rectum normal, vagina normal and uterus normal  Pelvic exam was performed with patient supine  There is no rash or lesion on the right labia  There is no rash or lesion on the left labia  Uterus is not enlarged and not tender  Cervix exhibits no discharge and no friability  Right adnexum displays no mass, no tenderness and no fullness  Left adnexum displays no mass, no tenderness and no fullness  No erythema, tenderness or bleeding in the vagina  No vaginal discharge found  Musculoskeletal: Normal range of motion  Lymphadenopathy:     She has no cervical adenopathy  She has no axillary adenopathy  Right: No inguinal and no supraclavicular adenopathy present  Left: No inguinal and no supraclavicular adenopathy present  Neurological: She is alert and oriented to person, place, and time  Skin: Skin is warm, dry and intact  No rash noted  Psychiatric: She has a normal mood and affect   Her speech is normal and behavior is normal  Judgment and thought content normal  Cognition and memory are normal

## 2019-12-17 LAB
LAB AP GYN PRIMARY INTERPRETATION: NORMAL
Lab: NORMAL

## 2020-04-08 ENCOUNTER — TRANSCRIBE ORDERS (OUTPATIENT)
Dept: OBGYN CLINIC | Facility: CLINIC | Age: 30
End: 2020-04-08

## 2020-04-08 ENCOUNTER — TELEMEDICINE (OUTPATIENT)
Dept: OBGYN CLINIC | Facility: CLINIC | Age: 30
End: 2020-04-08
Payer: COMMERCIAL

## 2020-04-08 DIAGNOSIS — Z30.011 ENCOUNTER FOR ORAL CONTRACEPTION INITIAL PRESCRIPTION: ICD-10-CM

## 2020-04-08 DIAGNOSIS — Z30.09 BIRTH CONTROL COUNSELING: Primary | ICD-10-CM

## 2020-04-08 DIAGNOSIS — Z30.09 ENCOUNTER FOR OTHER GENERAL COUNSELING AND ADVICE ON CONTRACEPTION: Primary | ICD-10-CM

## 2020-04-08 PROCEDURE — 99213 OFFICE O/P EST LOW 20 MIN: CPT | Performed by: OBSTETRICS & GYNECOLOGY

## 2020-04-09 RX ORDER — NORETHINDRONE ACETATE AND ETHINYL ESTRADIOL 1MG-20(21)
1 KIT ORAL DAILY
Qty: 28 TABLET | Refills: 6 | Status: SHIPPED | OUTPATIENT
Start: 2020-04-09 | End: 2020-08-25

## 2020-04-13 RX ORDER — NORETHINDRONE ACETATE AND ETHINYL ESTRADIOL 1MG-20(21)
1 KIT ORAL DAILY
Qty: 28 TABLET | Refills: 6 | Status: SHIPPED | OUTPATIENT
Start: 2020-04-13

## 2020-08-14 ENCOUNTER — OFFICE VISIT (OUTPATIENT)
Dept: OBGYN CLINIC | Facility: CLINIC | Age: 30
End: 2020-08-14
Payer: COMMERCIAL

## 2020-08-14 VITALS
SYSTOLIC BLOOD PRESSURE: 122 MMHG | TEMPERATURE: 98.2 F | DIASTOLIC BLOOD PRESSURE: 64 MMHG | WEIGHT: 160 LBS | BODY MASS INDEX: 27.46 KG/M2

## 2020-08-14 DIAGNOSIS — Z01.30 ENCOUNTER FOR CHECK OF BLOOD PRESSURE WHILE ON ORAL CONTRACEPTIVES: ICD-10-CM

## 2020-08-14 DIAGNOSIS — Z30.41 ENCOUNTER FOR SURVEILLANCE OF CONTRACEPTIVE PILLS: Primary | ICD-10-CM

## 2020-08-14 DIAGNOSIS — Z79.3 ENCOUNTER FOR CHECK OF BLOOD PRESSURE WHILE ON ORAL CONTRACEPTIVES: ICD-10-CM

## 2020-08-14 PROCEDURE — 99213 OFFICE O/P EST LOW 20 MIN: CPT | Performed by: OBSTETRICS & GYNECOLOGY

## 2020-08-14 NOTE — PATIENT INSTRUCTIONS
Patient is tolerating combo pill well and will continue on it  Patient will follow up in 6 months for annual gyn exam    Patient instructed to call the office with any complaints or concerns until next visit

## 2020-08-24 DIAGNOSIS — Z30.011 ENCOUNTER FOR ORAL CONTRACEPTION INITIAL PRESCRIPTION: ICD-10-CM

## 2020-08-25 RX ORDER — NORETHINDRONE ACETATE AND ETHINYL ESTRADIOL AND FERROUS FUMARATE 1MG-20(21)
KIT ORAL
Qty: 84 TABLET | Refills: 3 | Status: SHIPPED | OUTPATIENT
Start: 2020-08-25 | End: 2021-10-05

## 2021-01-29 DIAGNOSIS — Z23 ENCOUNTER FOR IMMUNIZATION: ICD-10-CM

## 2021-02-04 ENCOUNTER — IMMUNIZATIONS (OUTPATIENT)
Dept: FAMILY MEDICINE CLINIC | Facility: HOSPITAL | Age: 31
End: 2021-02-04

## 2021-02-04 DIAGNOSIS — Z23 ENCOUNTER FOR IMMUNIZATION: Primary | ICD-10-CM

## 2021-02-04 PROCEDURE — 0001A SARS-COV-2 / COVID-19 MRNA VACCINE (PFIZER-BIONTECH) 30 MCG: CPT

## 2021-02-04 PROCEDURE — 91300 SARS-COV-2 / COVID-19 MRNA VACCINE (PFIZER-BIONTECH) 30 MCG: CPT

## 2021-02-23 ENCOUNTER — IMMUNIZATIONS (OUTPATIENT)
Dept: FAMILY MEDICINE CLINIC | Facility: HOSPITAL | Age: 31
End: 2021-02-23

## 2021-02-23 DIAGNOSIS — Z23 ENCOUNTER FOR IMMUNIZATION: Primary | ICD-10-CM

## 2021-02-23 PROCEDURE — 0002A SARS-COV-2 / COVID-19 MRNA VACCINE (PFIZER-BIONTECH) 30 MCG: CPT

## 2021-02-23 PROCEDURE — 91300 SARS-COV-2 / COVID-19 MRNA VACCINE (PFIZER-BIONTECH) 30 MCG: CPT

## 2021-10-05 DIAGNOSIS — Z30.011 ENCOUNTER FOR ORAL CONTRACEPTION INITIAL PRESCRIPTION: ICD-10-CM

## 2021-10-05 RX ORDER — NORETHINDRONE ACETATE AND ETHINYL ESTRADIOL AND FERROUS FUMARATE 1MG-20(21)
KIT ORAL
Qty: 84 TABLET | Refills: 3 | Status: SHIPPED | OUTPATIENT
Start: 2021-10-05 | End: 2022-06-20 | Stop reason: SDUPTHER

## 2022-04-21 ENCOUNTER — OFFICE VISIT (OUTPATIENT)
Dept: OBGYN CLINIC | Facility: CLINIC | Age: 32
End: 2022-04-21
Payer: COMMERCIAL

## 2022-04-21 VITALS
WEIGHT: 161 LBS | BODY MASS INDEX: 27.49 KG/M2 | SYSTOLIC BLOOD PRESSURE: 118 MMHG | HEIGHT: 64 IN | DIASTOLIC BLOOD PRESSURE: 82 MMHG

## 2022-04-21 DIAGNOSIS — Z01.419 ENCNTR FOR GYN EXAM (GENERAL) (ROUTINE) W/O ABN FINDINGS: Primary | ICD-10-CM

## 2022-04-21 DIAGNOSIS — Z01.419 PAP SMEAR, AS PART OF ROUTINE GYNECOLOGICAL EXAMINATION: ICD-10-CM

## 2022-04-21 PROCEDURE — 99395 PREV VISIT EST AGE 18-39: CPT | Performed by: OBSTETRICS & GYNECOLOGY

## 2022-04-21 NOTE — PROGRESS NOTES
Assessment/Plan:    No problem-specific Assessment & Plan notes found for this encounter  Diagnoses and all orders for this visit:    Encntr for gyn exam (general) (routine) w/o abn findings  -     Thinprep Tis Pap and HPV mRNA E6/E7 Reflex HPV 16,18/45    Pap smear, as part of routine gynecological examination          Normal gynecological physical examination  Self-breast examination stressed  Discussed regular exercise, healthy diet, importance of vitamin D and calcium supplements  Discussed importance of sun block use during periods of prolonged sun exposure  Patient will be seen in 1 year for routine gynecologic and medical examination  Patient will call office for any problems, concerns, or issues which may arise during the interim  Subjective:          HPI    Patient ID: Alex Grace is a 28 y o  female who presents today for her annual gynecologic and medical examination    Menstrual status: LMP last week  28 day cycle  Reports light periods  Last 4-5 days  Well regulated on OCP  No concerns  No HA, leg swelling  Non-smoker  /82  Vasomotor symptoms: none    Patient reports normal appetite    Patient reports normal bowel and bladder habits    Patient denies any significant pelvic or abdominal pain    Patient denies any headaches, chest pain, shortness of breath fever shakes or chills    Patient denies any COVID 19 symptoms including cough or loss of taste or smell    COVID vaccine status: vaccinated    Medical problems: none    Colonoscopy status: n/a     Mammogram status: n/a    The following portions of the patient's history were reviewed and updated as appropriate: allergies, current medications, past family history, past medical history, past social history, past surgical history and problem list     Review of Systems   Constitutional: Negative  Negative for appetite change, diaphoresis, fatigue, fever and unexpected weight change  HENT: Negative  Eyes: Negative  Respiratory: Negative  Cardiovascular: Negative  Gastrointestinal: Negative  Negative for abdominal pain, blood in stool, constipation, diarrhea, nausea and vomiting  Endocrine: Negative  Negative for cold intolerance and heat intolerance  Genitourinary: Negative  Negative for dysuria, frequency, hematuria, urgency, vaginal bleeding, vaginal discharge and vaginal pain  Musculoskeletal: Negative  Skin: Negative  Allergic/Immunologic: Negative  Neurological: Negative  Hematological: Negative  Negative for adenopathy  Psychiatric/Behavioral: Negative  Objective:      /82 (BP Location: Left arm, Patient Position: Sitting, Cuff Size: Standard)   Ht 5' 4" (1 626 m)   Wt 73 kg (161 lb)   BMI 27 64 kg/m²          Physical Exam  Constitutional:       General: She is not in acute distress  Appearance: Normal appearance  She is well-developed  She is not diaphoretic  HENT:      Head: Normocephalic and atraumatic  Eyes:      Pupils: Pupils are equal, round, and reactive to light  Cardiovascular:      Rate and Rhythm: Normal rate and regular rhythm  Heart sounds: Normal heart sounds  No murmur heard  No friction rub  No gallop  Pulmonary:      Effort: Pulmonary effort is normal       Breath sounds: Normal breath sounds  Chest:   Breasts: Breasts are symmetrical       Right: No inverted nipple, mass, nipple discharge, skin change, tenderness or supraclavicular adenopathy  Left: No inverted nipple, mass, nipple discharge, skin change, tenderness or supraclavicular adenopathy  Abdominal:      General: Bowel sounds are normal       Palpations: Abdomen is soft  Genitourinary:     General: Normal vulva  Exam position: Supine  Labia:         Right: No rash or lesion  Left: No rash or lesion  Vagina: Normal  No vaginal discharge, erythema, tenderness or bleeding  Cervix: No discharge or friability        Uterus: Not enlarged and not tender  Adnexa:         Right: No mass, tenderness or fullness  Left: No mass, tenderness or fullness  Musculoskeletal:         General: Normal range of motion  Cervical back: Normal range of motion and neck supple  Lymphadenopathy:      Cervical: No cervical adenopathy  Upper Body:      Right upper body: No supraclavicular adenopathy  Left upper body: No supraclavicular adenopathy  Skin:     General: Skin is warm and dry  Findings: No rash  Neurological:      Mental Status: She is alert and oriented to person, place, and time  Psychiatric:         Speech: Speech normal          Behavior: Behavior normal          Thought Content:  Thought content normal          Judgment: Judgment normal

## 2022-04-22 LAB
CLINICAL INFO: NORMAL
CYTO CVX: NORMAL
CYTOLOGY CMNT CVX/VAG CYTO-IMP: NORMAL
DATE PREVIOUS BX: NORMAL
HPV E6+E7 MRNA CVX QL NAA+PROBE: NOT DETECTED
LMP START DATE: NORMAL
SL AMB PREV. PAP:: NORMAL
SPECIMEN SOURCE CVX/VAG CYTO: NORMAL

## 2022-06-20 DIAGNOSIS — Z30.011 ENCOUNTER FOR ORAL CONTRACEPTION INITIAL PRESCRIPTION: ICD-10-CM

## 2022-06-20 NOTE — TELEPHONE ENCOUNTER
Received request for mail order script for [facility] to [facility]  Confirmed with patient changing mail order plans  Aware script will be authorized with Dr Alis Rubalcava returns to office next week

## 2022-06-27 RX ORDER — NORETHINDRONE ACETATE AND ETHINYL ESTRADIOL 1MG-20(21)
1 KIT ORAL DAILY
Qty: 84 TABLET | Refills: 0 | Status: SHIPPED | OUTPATIENT
Start: 2022-06-27

## 2022-09-08 DIAGNOSIS — Z30.011 ENCOUNTER FOR ORAL CONTRACEPTION INITIAL PRESCRIPTION: ICD-10-CM

## 2022-09-09 RX ORDER — NORETHINDRONE ACETATE AND ETHINYL ESTRADIOL 1MG-20(21)
KIT ORAL
Qty: 84 TABLET | Refills: 3 | Status: SHIPPED | OUTPATIENT
Start: 2022-09-09

## 2022-11-16 ENCOUNTER — AMB VIDEO VISIT (OUTPATIENT)
Dept: OTHER | Facility: HOSPITAL | Age: 32
End: 2022-11-16

## 2022-11-16 DIAGNOSIS — J06.9 UPPER RESPIRATORY TRACT INFECTION, UNSPECIFIED TYPE: Primary | ICD-10-CM

## 2022-11-16 PROBLEM — O34.219 VAGINAL BIRTH AFTER CESAREAN (VBAC): Status: RESOLVED | Noted: 2018-04-10 | Resolved: 2022-11-16

## 2022-11-16 RX ORDER — AMOXICILLIN 500 MG/1
500 TABLET, FILM COATED ORAL 2 TIMES DAILY
Qty: 20 TABLET | Refills: 0 | Status: SHIPPED | OUTPATIENT
Start: 2022-11-16 | End: 2022-11-26

## 2022-11-16 NOTE — PATIENT INSTRUCTIONS
You likely have a virus such as a cold  You symptoms are likely going to get better in the next few days  IF your symptoms do not improve or you develop fever or white spots on your tonsils, you can take the antibiotics as prescribed  In the meantime, you can take tylenol (acetaminophen 500 or 650 mg every 6 hours) and ibuprofen (600 mg every 6 hours) as needed for fevers/headaches/body aches  Be sure to drink plenty of water  You can alternate these every 3 hours to get constant fever/pain relief  Over-the-counter medications such as cough suppressants (dextromethorphan), mucolytics (mucinex to help thin/loosen mucous), throat sprays, decongestants (pseudoephedrine or phenylephrine), nasal sprays (flonase, or afrin up to 3 days) can be taken as needed for symptom relief  Please schedule a follow-up with your PCP within the next 2 days   Go to the ER for new worsening or concerning symptoms including but not limited to shortness of breath or chest pain

## 2022-11-16 NOTE — PROGRESS NOTES
Video Visit - Hanane Calhoun 33 y o  female MRN: [id number]    REQUIRED DOCUMENTATION:         1  [facility]  2  Provider located at [address] [place] [id number] [phone]  3  [facility] provider: Batsheva Valles PA-C   4  Identify all parties in room with patient during [facility] visit:  No one else  5  After connecting through [employer], patient was identified by name and date of birth  Patient was then informed that this was a Telemedicine visit and that the exam was being conducted confidentially over secure lines  My office door was closed  No one else was in the room  Patient acknowledged consent and understanding of privacy and security of the Telemedicine visit  I informed the patient that I have reviewed their record in Epic and presented the opportunity for them to ask any questions regarding the visit today  The patient agreed to participate  VITALS: Heart Rate: 82 BPM, Respiratory Rate: 12 RPM, Temperature 98 4° F, Blood Pressure Unavailable mmHg, Pulse Ox Unavailable % on RA    HPI  Pt reports last week her children had strep  Pt began with cough and loss of voice starting Friday  Now with dry throat, no pain  Patient hasn't seen any spots in the back of her throat  No fevers  Concerned she needs abx since her children both had strep  Physical Exam  Constitutional:       General: She is not in acute distress  Appearance: Normal appearance  She is not ill-appearing or toxic-appearing  HENT:      Head: Normocephalic and atraumatic  Nose: No rhinorrhea  Mouth/Throat:      Mouth: Mucous membranes are moist       Comments: No erythema appreciated, however unable to coordinate well enough to get a good look at tonsils  Hoarse voice  Eyes:      Conjunctiva/sclera: Conjunctivae normal    Cardiovascular:      Rate and Rhythm: Normal rate  Pulmonary:      Effort: Pulmonary effort is normal  No respiratory distress  Breath sounds: No wheezing (no gross audible wheeze through computer)  Musculoskeletal:      Cervical back: Normal range of motion  Skin:     Findings: No rash (on face or neck)  Neurological:      Mental Status: She is alert  Cranial Nerves: No dysarthria or facial asymmetry  Psychiatric:         Mood and Affect: Mood normal          Behavior: Behavior normal          Diagnoses and all orders for this visit:    Upper respiratory tract infection, unspecified type  -     amoxicillin (AMOXIL) 500 MG tablet; Take 1 tablet (500 mg total) by mouth 2 (two) times a day for 10 days      Patient Instructions   You likely have a virus such as a cold  You symptoms are likely going to get better in the next few days  IF your symptoms do not improve or you develop fever or white spots on your tonsils, you can take the antibiotics as prescribed  In the meantime, you can take tylenol (acetaminophen 500 or 650 mg every 6 hours) and ibuprofen (600 mg every 6 hours) as needed for fevers/headaches/body aches  Be sure to drink plenty of water  You can alternate these every 3 hours to get constant fever/pain relief  Over-the-counter medications such as cough suppressants (dextromethorphan), mucolytics (mucinex to help thin/loosen mucous), throat sprays, decongestants (pseudoephedrine or phenylephrine), nasal sprays (flonase, or afrin up to 3 days) can be taken as needed for symptom relief  Please schedule a follow-up with your PCP within the next 2 days   Go to the ER for new worsening or concerning symptoms including but not limited to shortness of breath or chest pain

## 2023-04-24 ENCOUNTER — ANNUAL EXAM (OUTPATIENT)
Dept: OBGYN CLINIC | Facility: CLINIC | Age: 33
End: 2023-04-24

## 2023-04-24 VITALS
DIASTOLIC BLOOD PRESSURE: 80 MMHG | WEIGHT: 176.4 LBS | BODY MASS INDEX: 30.11 KG/M2 | SYSTOLIC BLOOD PRESSURE: 126 MMHG | HEIGHT: 64 IN

## 2023-04-24 DIAGNOSIS — Z01.419 PAP SMEAR, AS PART OF ROUTINE GYNECOLOGICAL EXAMINATION: ICD-10-CM

## 2023-04-24 DIAGNOSIS — Z01.419 ENCNTR FOR GYN EXAM (GENERAL) (ROUTINE) W/O ABN FINDINGS: Primary | ICD-10-CM

## 2023-04-24 NOTE — PROGRESS NOTES
Assessment/Plan:    No problem-specific Assessment & Plan notes found for this encounter  Diagnoses and all orders for this visit:    Encntr for gyn exam (general) (routine) w/o abn findings  -     Thinprep Tis Pap Reflex HPV mRNA E6/E7    Pap smear, as part of routine gynecological examination          Normal gynecological physical examination  Self-breast examination stressed  Discussed regular exercise, healthy diet, importance of vitamin D and calcium supplements  Discussed importance of sun block use during periods of prolonged sun exposure  Patient will be seen in 1 year for routine gynecologic and medical examination  Patient will call office for any problems, concerns, or issues which may arise during the interim  Subjective:          HPI    Patient ID: Zac Triana is a 33 y o  female who presents today for her annual gynecologic and medical examination    Menstrual status: Patient reports that her periods are regular and light  They last about 4 to 5 days with no cramping or spotting in between  Vasomotor symptoms: Not applicable due to patient age    Patient reports normal appetite    Patient reports normal bowel and bladder habits    Patient denies any significant pelvic or abdominal pain    Patient denies any headaches, chest pain, shortness of breath fever shakes or chills nausea vomiting diarrhea constipation lightheadedness dizziness headaches dysuria abnormal vaginal discharge  Patient denies any COVID 19 symptoms including cough or loss of taste or smell    COVID vaccine status: Patient is up-to-date on all COVID vaccinations    Medical problems: Patient does not follow anyone for any significant medical problems      Colonoscopy status: Not applicable due to patient age    Mammogram status: Not applicable due to patient age    The following portions of the patient's history were reviewed and updated as appropriate: allergies, current medications, past family history, past "medical history, past social history, past surgical history and problem list     Review of Systems   Constitutional: Negative  Negative for appetite change, chills, diaphoresis, fatigue, fever and unexpected weight change  HENT: Negative  Eyes: Negative  Respiratory: Negative  Negative for chest tightness and shortness of breath  Cardiovascular: Negative  Negative for chest pain and palpitations  Gastrointestinal: Negative  Negative for abdominal pain, blood in stool, constipation, diarrhea, nausea and vomiting  Endocrine: Negative  Negative for cold intolerance, heat intolerance and polyuria  Genitourinary: Negative  Negative for dysuria, frequency, hematuria, menstrual problem, urgency, vaginal bleeding, vaginal discharge and vaginal pain  Musculoskeletal: Negative  Skin: Negative  Allergic/Immunologic: Negative  Neurological: Negative  Negative for dizziness, light-headedness and headaches  Hematological: Negative  Negative for adenopathy  Psychiatric/Behavioral: Negative  Objective:      /80   Ht 5' 4\" (1 626 m)   Wt 80 kg (176 lb 6 4 oz)   LMP 04/13/2023 (Exact Date)   BMI 30 28 kg/m²          Physical Exam  Constitutional:       General: She is not in acute distress  Appearance: Normal appearance  She is well-developed  She is not ill-appearing or diaphoretic  HENT:      Head: Normocephalic and atraumatic  Eyes:      General: No scleral icterus  Conjunctiva/sclera: Conjunctivae normal       Pupils: Pupils are equal, round, and reactive to light  Cardiovascular:      Rate and Rhythm: Normal rate and regular rhythm  Heart sounds: Normal heart sounds  No murmur heard  No friction rub  No gallop  Pulmonary:      Effort: Pulmonary effort is normal       Breath sounds: Normal breath sounds  Chest:   Breasts:     Breasts are symmetrical       Right: No inverted nipple, mass, nipple discharge, skin change or tenderness        Left: No " inverted nipple, mass, nipple discharge, skin change or tenderness  Abdominal:      General: Bowel sounds are normal       Palpations: Abdomen is soft  Genitourinary:     General: Normal vulva  Exam position: Supine  Labia:         Right: No rash or lesion  Left: No rash or lesion  Urethra: No urethral swelling or urethral lesion  Vagina: Normal  No vaginal discharge, erythema, tenderness or bleeding  Cervix: No discharge or friability  Uterus: Not enlarged and not tender  Adnexa:         Right: No mass, tenderness or fullness  Left: No mass, tenderness or fullness  Musculoskeletal:         General: Normal range of motion  Cervical back: Normal range of motion and neck supple  Lymphadenopathy:      Cervical: No cervical adenopathy  Upper Body:      Right upper body: No supraclavicular adenopathy  Left upper body: No supraclavicular adenopathy  Skin:     General: Skin is warm and dry  Findings: No rash  Neurological:      General: No focal deficit present  Mental Status: She is alert and oriented to person, place, and time  Psychiatric:         Mood and Affect: Mood normal          Speech: Speech normal          Behavior: Behavior normal          Thought Content:  Thought content normal          Judgment: Judgment normal

## 2023-05-10 LAB
CLINICAL INFO: NORMAL
CYTO CVX: NORMAL
CYTOLOGY CMNT CVX/VAG CYTO-IMP: NORMAL
DATE PREVIOUS BX: NORMAL
LMP START DATE: NORMAL
SL AMB PREV. PAP:: NORMAL
SPECIMEN SOURCE CVX/VAG CYTO: NORMAL

## 2023-05-24 ENCOUNTER — AMB VIDEO VISIT (OUTPATIENT)
Dept: OTHER | Facility: HOSPITAL | Age: 33
End: 2023-05-24

## 2023-05-24 NOTE — CARE ANYWHERE EVISITS
Visit Summary for Martinez Laws - Gender: Female - Date of Birth: [id number]  Date: [id number] - Duration: 3 minutes  Patient: Martinez Laws  Provider: Maryana Vizcarra    Patient Contact Information  Address  [address] [place] [address]; [address] [place] [address]  [phone]    Visit Topics  Strep throat/ sore throat [Added By: Self - 2023-05-24]    Triage Questions   What is your current physical address in the event of a medical emergency? Answer []  Are you allergic to any medications? Answer []  Are you now or could you be pregnant? Answer []  Do you have any immune system compromise or chronic lung   disease? Answer []  Do you have any vulnerable family members in the home (infant, pregnant, cancer, elderly)? Answer []     Conversation Transcripts  [0A][0A] [Notification] You are connected with Maryana Vizcarra, Family Physician [0A][Notification] [facility] is located in [place]  [0A][Notification] [facility] has shared health history  Bryan Rusty  [0A]    Diagnosis  Acute pharyngitis, unspecified    Procedures  Value: [id number] Code: CPT-4 UNLISTED E&M SERVICE    Medications Prescribed    penicillin V potassium  Dose : 1 tablet  Route : oral  Frequency : 2 times a day  Until directed to stop  Refills : 0  Instructions to the Pharmacist : Substitutions allowed      Provider Notes  [0A][0A] [0A]Clinician has verified patient location and identification[0A][_] If patient is a minor parent/guardian and patient are both present  [0A]Mode of Communication: [0A][0A][_] Phone[0A][0A][x] Video[0A]History of Present Illness[0A][0A]The   patient presents to discuss (describe): Strep throat/sore throat, lymph node swelling and tenderness [0A]Onset of symptoms:4 days ago[0A]Duration of symptoms: [0A][0A]Location of pain: [0A]Significant head congestion?[0A][0A][x] Yes  [_]   No[0A][0A]Comments: [0A][0A]Headache?[0A][0A][_] Yes  [x] No[0A][0A]Comments: [0A][0A]Nausea or vomiting?[0A][0A][_] Yes  [x] No[0A][0A]Comments: [0A][0A]Sick contacts? [0A][0A][x] Yes  [_] No[0A][0A]Comments: 2 kids tested positive for strep [0A]History   of recently recurrent strep (>3-4 times this year)? [0A][0A][_] Yes  [x] No[0A][0A]Comments: [0A][0A]Household member with rheumatic heart disease or glomerulonephritis? [0A][0A][_] Yes  [_] No[0A][0A]Comments: [0A][0A]Other symptoms? [0A][0A][_] Yes  [x]   No[0A][0A]Comments:no longer having a fever [0A][0A]Past Medical History:[0A][0A]Up to date with immunizations? [0A][0A][_] Yes  [_] No[0A][0A]Comments: [0A][0A]Past Surgical History: [0A]Medications:ocp[0A]Allergies:no[0A]Social   History:[0A][0A]Tobacco use? [0A][0A][_] Yes  [x] No[0A][0A]Comments:[0A][0A]Alcohol use? [0A][0A][_] Yes  [x] No[0A][0A]Comments:[0A][0A]Recreational drug use? [0A][0A][_] Yes  [_] No[0A][0A]Comments:[0A][0A]Other (describe): [0A][0A][_] Yes  [_]   No[0A][0A]Comments: [0A]Family History: [0A]If the patient is female, is she pregnant or nursing?[0A][0A][_] Yes  [x] No[0A][0A]Comments: [0A]Examination[0A][0A]General: [0A][0A][x] Normal  [_] Abnormal[0A][0A]Comments (describe key positive and negative   findings): [0A][0A]Mouth/pharynx: [0A][0A][_] Normal  [x] Abnormal[0A][0A]Comments (describe key positive and negative findings): pharyngeal erythema [0A]Neck/Lymphatic: [0A][0A][_] Normal  [_] Abnormal[0A][0A]Comments (describe key positive and   negative findings): [0A][0A]Respiratory: [0A][0A][_] Normal  [_] Abnormal[0A][0A]Comments (describe key positive and negative findings): [0A][0A]Other: [0A][0A][_] Normal  [_] Abnormal[0A][0A]Comments (describe key positive and negative findings):   [0A][0A]Assessment[0A][0A] J02 9 Acute pharyngitis, unspecified[0A][0A]Plan[0A][0A]1  Medications: Penicillin [0A]2  Medical decision making: [0A][0A]        a  Treatment chosen because: [0A][0A]3  Referral or follow up: [0A]For Virtual   Primary Care Program[0A][0A]1  Gaps in care closure (if patient stable): [0A][0A]        a      Adults ages 21 and older should see a health care provider no less than every two years- and much more often for follow up of chronic conditions  [0A][0A]          b  Be sure to follow up with your PCP regularly for any chronic medical conditions  [0A][0A]2  Labs: [0A][0A]3  Referral Management: [0A][0A]4  Follow up plan: [0A][0A]5  Additional refills or forms: [0A][0A]Additional   recommendations[0A][0A]1  If you received a prescription at this visit and you have a question or problem, please call [phone] for prescription assistance  [0A][0A]2  Please print a copy of this note and send it to your regular doctor or take   it to your next visit so it may be included in your medical record  [0A][0A]3  Please see your primary care provider on an annual basis or more frequently if directed  [0A][0A]4  If you are experiencing any new or worsening symptoms that you believe   may be a medical emergency, please seek in-person care immediately  [0A][0A]5  Additional recommendations: [0A]The patient voiced understanding and agreement with plan  [0A]    Electronically signed by: Katerina Redd(NPI [id number])

## 2023-07-31 DIAGNOSIS — Z30.09 BIRTH CONTROL COUNSELING: ICD-10-CM

## 2023-07-31 RX ORDER — NORETHINDRONE ACETATE AND ETHINYL ESTRADIOL AND FERROUS FUMARATE 1MG-20(21)
1 KIT ORAL DAILY
Qty: 84 TABLET | Refills: 3 | Status: SHIPPED | OUTPATIENT
Start: 2023-07-31

## 2024-02-27 NOTE — PROGRESS NOTES
Assessment/Plan:      Diagnoses and all orders for this visit:    Encounter for surveillance of contraceptive pills    Encounter for check of blood pressure while on oral contraceptives        Patient is tolerating combo pill well and will continue on it  Patient will follow up in 6 months for annual gyn exam    Patient instructed to call the office with any complaints or concerns until next visit  Subjective:     Patient ID: Zoltan Romero is a 30 y o  female  Patient is a 29-year-old female presenting to the office for follow up of contraceptive use  Patient states the combo pill is working well for her and has no complaints  Patient states her menses are regular, and last about 4-5 days, and are lighter in flow than previously before the pill  Patient states the pill as also helped with back pain that occurs during her menses  Patient denies any irregular menses or breakthrough bleeding  Patient denies any pelvic pain or pelvic cramping  Patient denies any vaginal pain, vaginal discharge, or vaginal itching  Patient denies any abdominal pain, nausea, vomiting, diarrhea, or blood in her stool  Patient denies any urinary symptoms of urgency, frequency, dysuria, or hematuria  Patient denies any fevers, chills, chest pain, shortness of breath, leg swelling or tenderness  Total time of today's visit was 20 minutes of which greater than 50% was spent face-to-face counseling the patient as well as coordination of care  Review of Systems   Constitutional: Negative for chills and fever  Respiratory: Negative for cough and shortness of breath  Cardiovascular: Negative for chest pain and leg swelling  Gastrointestinal: Negative for abdominal pain, blood in stool, diarrhea, nausea and vomiting  Genitourinary: Negative for dysuria, frequency, pelvic pain, urgency, vaginal discharge and vaginal pain           Objective:     Physical Exam  Constitutional:       Appearance: She is well-developed  HENT:      Head: Normocephalic  Eyes:      Pupils: Pupils are equal, round, and reactive to light  Neck:      Musculoskeletal: Normal range of motion and neck supple  Cardiovascular:      Rate and Rhythm: Normal rate and regular rhythm  Pulmonary:      Effort: Pulmonary effort is normal       Breath sounds: Normal breath sounds  Musculoskeletal: Normal range of motion  Skin:     General: Skin is warm and dry  Neurological:      Mental Status: She is alert and oriented to person, place, and time  Psychiatric:         Behavior: Behavior normal          Thought Content:  Thought content normal          Judgment: Judgment normal  20.58

## 2024-05-06 ENCOUNTER — ANNUAL EXAM (OUTPATIENT)
Dept: OBGYN CLINIC | Facility: CLINIC | Age: 34
End: 2024-05-06
Payer: COMMERCIAL

## 2024-05-06 VITALS
DIASTOLIC BLOOD PRESSURE: 82 MMHG | WEIGHT: 165.2 LBS | SYSTOLIC BLOOD PRESSURE: 122 MMHG | BODY MASS INDEX: 28.2 KG/M2 | HEIGHT: 64 IN

## 2024-05-06 DIAGNOSIS — Z30.41 ORAL CONTRACEPTIVE PILL SURVEILLANCE: ICD-10-CM

## 2024-05-06 DIAGNOSIS — Z01.419 PAP SMEAR, AS PART OF ROUTINE GYNECOLOGICAL EXAMINATION: ICD-10-CM

## 2024-05-06 DIAGNOSIS — Z30.011 ENCOUNTER FOR ORAL CONTRACEPTION INITIAL PRESCRIPTION: ICD-10-CM

## 2024-05-06 DIAGNOSIS — Z01.419 ENCOUNTER FOR GYNECOLOGICAL EXAMINATION WITHOUT ABNORMAL FINDING: Primary | ICD-10-CM

## 2024-05-06 PROCEDURE — 99385 PREV VISIT NEW AGE 18-39: CPT | Performed by: OBSTETRICS & GYNECOLOGY

## 2024-05-06 RX ORDER — NORETHINDRONE ACETATE AND ETHINYL ESTRADIOL 1MG-20(21)
1 KIT ORAL DAILY
COMMUNITY
End: 2024-05-06 | Stop reason: SDUPTHER

## 2024-05-06 RX ORDER — NORETHINDRONE ACETATE AND ETHINYL ESTRADIOL 1MG-20(21)
1 KIT ORAL DAILY
Qty: 84 TABLET | Refills: 3 | Status: SHIPPED | OUTPATIENT
Start: 2024-05-06

## 2024-05-06 NOTE — PROGRESS NOTES
Assessment/Plan:    No problem-specific Assessment & Plan notes found for this encounter.       Diagnoses and all orders for this visit:    Encounter for gynecological examination without abnormal finding    Pap smear, as part of routine gynecological examination  -     Thinprep Tis Pap Reflex HPV mRNA E6/E7    Encounter for oral contraception initial prescription    Other orders  -     norethindrone-ethinyl estradiol (JUNEL FE 1/20) 1-20 MG-MCG per tablet; Take 1 tablet by mouth daily          Normal gynecological physical examination.  Self-breast examination stressed.  Discussed regular exercise, healthy diet, importance of vitamin D and calcium supplements.  Discussed importance of sun block use during periods of prolonged sun exposure.  Patient will be seen in 1 year for routine gynecologic and medical examination.  Patient will call office for any problems, concerns, or issues which may arise during the interim.     Discussed possible future pregnancy with patient at today's visit.    Subjective:          HPI    Patient ID: Deyanira Gómez is a 33 y.o. female who presents today for her annual gynecologic and medical examination    Menstrual status: Patient reports normal cycles on the oral contraceptive.  Denies any significant problems.    Vasomotor symptoms: Denies any significant vasomotor symptoms    Patient reports normal appetite    Patient reports normal bowel and bladder habits    Patient denies any significant pelvic or abdominal pain    Patient denies any headaches, chest pain, shortness of breath fever shakes or chills    Patient denies any COVID 19 symptoms including cough or loss of taste or smell    COVID vaccine status: Up-to-date with COVID vaccination    Medical problems: None    Colonoscopy status: Not eligible    Mammogram status: Not eligible.  Importance of self breast examination stressed to the patient at today's visit.    The following portions of the patient's history were reviewed and  "updated as appropriate: allergies, current medications, past family history, past medical history, past social history, past surgical history and problem list.    Review of Systems   Constitutional: Negative.  Negative for appetite change, diaphoresis, fatigue, fever and unexpected weight change.   HENT: Negative.     Eyes: Negative.    Respiratory: Negative.     Cardiovascular: Negative.    Gastrointestinal: Negative.  Negative for abdominal pain, blood in stool, constipation, diarrhea, nausea and vomiting.   Endocrine: Negative.  Negative for cold intolerance and heat intolerance.   Genitourinary: Negative.  Negative for dysuria, frequency, hematuria, urgency, vaginal bleeding, vaginal discharge and vaginal pain.   Musculoskeletal: Negative.    Skin: Negative.    Allergic/Immunologic: Negative.    Neurological: Negative.    Hematological: Negative.  Negative for adenopathy.   Psychiatric/Behavioral: Negative.           Objective:      /82   Ht 5' 4\" (1.626 m)   Wt 74.9 kg (165 lb 3.2 oz)   LMP 04/27/2024 (Exact Date)   BMI 28.36 kg/m²          Physical Exam  Constitutional:       General: She is not in acute distress.     Appearance: Normal appearance. She is well-developed. She is not diaphoretic.   HENT:      Head: Normocephalic and atraumatic.   Eyes:      Pupils: Pupils are equal, round, and reactive to light.   Cardiovascular:      Rate and Rhythm: Normal rate and regular rhythm.      Heart sounds: Normal heart sounds. No murmur heard.     No friction rub. No gallop.   Pulmonary:      Effort: Pulmonary effort is normal.      Breath sounds: Normal breath sounds.   Chest:   Breasts:     Breasts are symmetrical.      Right: No inverted nipple, mass, nipple discharge, skin change or tenderness.      Left: No inverted nipple, mass, nipple discharge, skin change or tenderness.   Abdominal:      General: Bowel sounds are normal.      Palpations: Abdomen is soft.   Genitourinary:     General: Normal vulva. "      Exam position: Supine.      Labia:         Right: No rash or lesion.         Left: No rash or lesion.       Vagina: Normal. No vaginal discharge, erythema, tenderness or bleeding.      Cervix: No discharge or friability.      Uterus: Not enlarged and not tender.       Adnexa:         Right: No mass, tenderness or fullness.          Left: No mass, tenderness or fullness.     Musculoskeletal:         General: Normal range of motion.      Cervical back: Normal range of motion and neck supple.   Lymphadenopathy:      Cervical: No cervical adenopathy.      Upper Body:      Right upper body: No supraclavicular adenopathy.      Left upper body: No supraclavicular adenopathy.   Skin:     General: Skin is warm and dry.      Findings: No rash.   Neurological:      General: No focal deficit present.      Mental Status: She is alert and oriented to person, place, and time.   Psychiatric:         Mood and Affect: Mood normal.         Speech: Speech normal.         Behavior: Behavior normal.         Thought Content: Thought content normal.         Judgment: Judgment normal.

## 2024-06-30 ENCOUNTER — HOSPITAL ENCOUNTER (EMERGENCY)
Facility: HOSPITAL | Age: 34
Discharge: HOME | End: 2024-06-30
Attending: EMERGENCY MEDICINE
Payer: COMMERCIAL

## 2024-06-30 ENCOUNTER — APPOINTMENT (EMERGENCY)
Dept: CT IMAGING | Facility: HOSPITAL | Age: 34
End: 2024-06-30
Payer: COMMERCIAL

## 2024-06-30 VITALS
TEMPERATURE: 98 F | HEART RATE: 83 BPM | SYSTOLIC BLOOD PRESSURE: 115 MMHG | RESPIRATION RATE: 18 BRPM | DIASTOLIC BLOOD PRESSURE: 62 MMHG | OXYGEN SATURATION: 100 %

## 2024-06-30 DIAGNOSIS — R51.9 HEADACHE: Primary | ICD-10-CM

## 2024-06-30 DIAGNOSIS — B34.9 VIRAL SYNDROME: ICD-10-CM

## 2024-06-30 LAB
ALBUMIN SERPL BCG-MCNC: 4.6 G/DL (ref 3.5–5)
ALP SERPL-CCNC: 52 U/L (ref 34–104)
ALT SERPL W P-5'-P-CCNC: 16 U/L (ref 7–52)
ANION GAP SERPL CALCULATED.3IONS-SCNC: 8 MMOL/L (ref 4–13)
AST SERPL W P-5'-P-CCNC: 15 U/L (ref 13–39)
BACTERIA UR QL AUTO: NORMAL /HPF
BASOPHILS # BLD AUTO: 0.04 THOUSANDS/ÂΜL (ref 0–0.1)
BASOPHILS NFR BLD AUTO: 0 % (ref 0–1)
BILIRUB SERPL-MCNC: 0.63 MG/DL (ref 0.2–1)
BILIRUB UR QL STRIP: NEGATIVE
BUN SERPL-MCNC: 8 MG/DL (ref 5–25)
CALCIUM SERPL-MCNC: 9.6 MG/DL (ref 8.4–10.2)
CHLORIDE SERPL-SCNC: 101 MMOL/L (ref 96–108)
CLARITY UR: CLEAR
CO2 SERPL-SCNC: 27 MMOL/L (ref 21–32)
COLOR UR: YELLOW
CREAT SERPL-MCNC: 0.72 MG/DL (ref 0.6–1.3)
EOSINOPHIL # BLD AUTO: 0.07 THOUSAND/ÂΜL (ref 0–0.61)
EOSINOPHIL NFR BLD AUTO: 1 % (ref 0–6)
ERYTHROCYTE [DISTWIDTH] IN BLOOD BY AUTOMATED COUNT: 12.1 % (ref 11.6–15.1)
EXT PREGNANCY TEST URINE: NEGATIVE
EXT. CONTROL: NORMAL
FLUAV RNA RESP QL NAA+PROBE: NEGATIVE
FLUBV RNA RESP QL NAA+PROBE: NEGATIVE
GFR SERPL CREATININE-BSD FRML MDRD: 111 ML/MIN/1.73SQ M
GLUCOSE SERPL-MCNC: 93 MG/DL (ref 65–140)
GLUCOSE UR STRIP-MCNC: NEGATIVE MG/DL
HCT VFR BLD AUTO: 45.4 % (ref 34.8–46.1)
HGB BLD-MCNC: 14.9 G/DL (ref 11.5–15.4)
HGB UR QL STRIP.AUTO: NEGATIVE
IMM GRANULOCYTES # BLD AUTO: 0.07 THOUSAND/UL (ref 0–0.2)
IMM GRANULOCYTES NFR BLD AUTO: 1 % (ref 0–2)
KETONES UR STRIP-MCNC: NEGATIVE MG/DL
LEUKOCYTE ESTERASE UR QL STRIP: ABNORMAL
LYMPHOCYTES # BLD AUTO: 2.49 THOUSANDS/ÂΜL (ref 0.6–4.47)
LYMPHOCYTES NFR BLD AUTO: 23 % (ref 14–44)
MCH RBC QN AUTO: 28.4 PG (ref 26.8–34.3)
MCHC RBC AUTO-ENTMCNC: 32.8 G/DL (ref 31.4–37.4)
MCV RBC AUTO: 87 FL (ref 82–98)
MONOCYTES # BLD AUTO: 0.44 THOUSAND/ÂΜL (ref 0.17–1.22)
MONOCYTES NFR BLD AUTO: 4 % (ref 4–12)
NEUTROPHILS # BLD AUTO: 7.7 THOUSANDS/ÂΜL (ref 1.85–7.62)
NEUTS SEG NFR BLD AUTO: 71 % (ref 43–75)
NITRITE UR QL STRIP: NEGATIVE
NON-SQ EPI CELLS URNS QL MICRO: NORMAL /HPF
NRBC BLD AUTO-RTO: 0 /100 WBCS
PH UR STRIP.AUTO: 7.5 [PH]
PLATELET # BLD AUTO: 350 THOUSANDS/UL (ref 149–390)
PMV BLD AUTO: 9.1 FL (ref 8.9–12.7)
POTASSIUM SERPL-SCNC: 3.8 MMOL/L (ref 3.5–5.3)
PROT SERPL-MCNC: 8 G/DL (ref 6.4–8.4)
PROT UR STRIP-MCNC: ABNORMAL MG/DL
RBC # BLD AUTO: 5.25 MILLION/UL (ref 3.81–5.12)
RBC #/AREA URNS AUTO: NORMAL /HPF
RSV RNA RESP QL NAA+PROBE: NEGATIVE
SARS-COV-2 RNA RESP QL NAA+PROBE: NEGATIVE
SODIUM SERPL-SCNC: 136 MMOL/L (ref 135–147)
SP GR UR STRIP.AUTO: 1.01 (ref 1–1.03)
UROBILINOGEN UR STRIP-ACNC: <2 MG/DL
WBC # BLD AUTO: 10.81 THOUSAND/UL (ref 4.31–10.16)
WBC #/AREA URNS AUTO: NORMAL /HPF

## 2024-06-30 PROCEDURE — 81001 URINALYSIS AUTO W/SCOPE: CPT | Performed by: EMERGENCY MEDICINE

## 2024-06-30 PROCEDURE — 70450 CT HEAD/BRAIN W/O DYE: CPT

## 2024-06-30 PROCEDURE — 96374 THER/PROPH/DIAG INJ IV PUSH: CPT

## 2024-06-30 PROCEDURE — 96375 TX/PRO/DX INJ NEW DRUG ADDON: CPT

## 2024-06-30 PROCEDURE — 0241U HB NFCT DS VIR RESP RNA 4 TRGT: CPT | Performed by: EMERGENCY MEDICINE

## 2024-06-30 PROCEDURE — 81025 URINE PREGNANCY TEST: CPT | Performed by: EMERGENCY MEDICINE

## 2024-06-30 PROCEDURE — 96361 HYDRATE IV INFUSION ADD-ON: CPT

## 2024-06-30 PROCEDURE — 85025 COMPLETE CBC W/AUTO DIFF WBC: CPT | Performed by: EMERGENCY MEDICINE

## 2024-06-30 PROCEDURE — 36415 COLL VENOUS BLD VENIPUNCTURE: CPT | Performed by: EMERGENCY MEDICINE

## 2024-06-30 PROCEDURE — 99284 EMERGENCY DEPT VISIT MOD MDM: CPT | Performed by: EMERGENCY MEDICINE

## 2024-06-30 PROCEDURE — 86618 LYME DISEASE ANTIBODY: CPT | Performed by: EMERGENCY MEDICINE

## 2024-06-30 PROCEDURE — 80053 COMPREHEN METABOLIC PANEL: CPT | Performed by: EMERGENCY MEDICINE

## 2024-06-30 PROCEDURE — 99284 EMERGENCY DEPT VISIT MOD MDM: CPT

## 2024-06-30 RX ORDER — METOCLOPRAMIDE HYDROCHLORIDE 5 MG/ML
10 INJECTION INTRAMUSCULAR; INTRAVENOUS ONCE
Status: COMPLETED | OUTPATIENT
Start: 2024-06-30 | End: 2024-06-30

## 2024-06-30 RX ORDER — DIPHENHYDRAMINE HYDROCHLORIDE 50 MG/ML
25 INJECTION INTRAMUSCULAR; INTRAVENOUS ONCE
Status: COMPLETED | OUTPATIENT
Start: 2024-06-30 | End: 2024-06-30

## 2024-06-30 RX ADMIN — SODIUM CHLORIDE 1000 ML: 0.9 INJECTION, SOLUTION INTRAVENOUS at 08:48

## 2024-06-30 RX ADMIN — DIPHENHYDRAMINE HYDROCHLORIDE 25 MG: 50 INJECTION, SOLUTION INTRAMUSCULAR; INTRAVENOUS at 08:47

## 2024-06-30 RX ADMIN — METOCLOPRAMIDE 10 MG: 5 INJECTION, SOLUTION INTRAMUSCULAR; INTRAVENOUS at 08:47

## 2024-06-30 NOTE — ED PROVIDER NOTES
History  Chief Complaint   Patient presents with    Headache     Pt reports having fevers earlier in week, pt has now developed a headache between her eyes. Pt is nausea and vomiting     This is a 33-year-old female who presents with a frontal headache that started yesterday also had a fever earlier in the week denies any cough no bodyaches she is awake alert nontoxic-appearing without any focal neurologic deficits Kernig and Brezinski sign is negative.  Denies any prior history of headaches      History provided by:  Patient  Medical Problem  Location:  Frontal  Quality:  Sharp pain  Severity:  Moderate  Onset quality:  Gradual  Duration:  2 days  Timing:  Constant  Progression:  Waxing and waning  Chronicity:  New  Context:  Frontal headache with nausea and vomiting and reported fever several days ago  Associated symptoms: headaches, nausea and vomiting        Prior to Admission Medications   Prescriptions Last Dose Informant Patient Reported? Taking?   Carole  1-20 MG-MCG per tablet   No No   Sig: TAKE 1 TABLET DAILY   Patient not taking: Reported on 2024   norethindrone-ethinyl estradiol (JUNEL ) 1-20 MG-MCG per tablet   No No   Sig: Take 1 tablet by mouth daily      Facility-Administered Medications: None       Past Medical History:   Diagnosis Date    Elevated blood pressure reading     had increase BP with last pregnancy , now normal readings    Hypertension     Varicella     childhood disease around 3 years old        Past Surgical History:   Procedure Laterality Date     SECTION      [place]  DELIVERY ONLY N/A 2016    Procedure:  SECTION ();  Surgeon: Memo Chun MD;  [facility]: [facility] ;  Service: Obstetrics       Family History   Adopted: Yes   Problem Relation Age of Onset    Early death Maternal Grandmother      I have reviewed and agree with the history as documented.    E-Cigarette/Vaping     E-Cigarette/Vaping Substances     Social History      Tobacco Use    Smoking status: Never    Smokeless tobacco: Never   Substance Use Topics    Alcohol use: No    Drug use: No       Review of Systems   Gastrointestinal:  Positive for nausea and vomiting.   Neurological:  Positive for headaches.   All other systems reviewed and are negative.      Physical Exam  Physical Exam  Vitals and nursing note reviewed.   Constitutional:       Appearance: She is not toxic-appearing.   HENT:      Head: Normocephalic and atraumatic.      Right Ear: Tympanic membrane, ear canal and external ear normal.      Left Ear: Tympanic membrane, ear canal and external ear normal.      Mouth/Throat:      Mouth: Mucous membranes are dry.   Eyes:      General:         Right eye: No discharge.         Left eye: No discharge.      Extraocular Movements: Extraocular movements intact.      Pupils: Pupils are equal, round, and reactive to light.   Cardiovascular:      Rate and Rhythm: Normal rate and regular rhythm.      Pulses: Normal pulses.      Heart sounds: No murmur heard.     No friction rub. No gallop.   Pulmonary:      Effort: Pulmonary effort is normal. No respiratory distress.      Breath sounds: No stridor. No wheezing, rhonchi or rales.   Abdominal:      General: There is no distension.      Palpations: Abdomen is soft.      Tenderness: There is no abdominal tenderness.   Musculoskeletal:         General: No swelling, tenderness, deformity or signs of injury. Normal range of motion.      Cervical back: Normal range of motion and neck supple. No rigidity or tenderness.      Right lower leg: No edema.      Left lower leg: No edema.   Skin:     General: Skin is warm and dry.      Coloration: Skin is not jaundiced.      Findings: No bruising, erythema or rash.   Neurological:      General: No focal deficit present.      Mental Status: She is alert and oriented to person, place, and time.      Cranial Nerves: No cranial nerve deficit.      Sensory: No sensory deficit.      Motor: No  weakness.      Coordination: Coordination normal.   Psychiatric:         Mood and Affect: Mood normal.         Behavior: Behavior normal.         Thought Content: Thought content normal.         Vital Signs  ED Triage Vitals   Temperature Pulse Respirations Blood Pressure SpO2   06/30/24 0708 06/30/24 0703 06/30/24 0703 06/30/24 0703 06/30/24 0703   98 °F (36.7 °C) 98 18 150/76 98 %      Temp Source Heart Rate Source Patient Position - Orthostatic VS BP Location FiO2 (%)   06/30/24 0708 -- -- -- --   Temporal          Pain Score       06/30/24 0703       7           Vitals:    06/30/24 0703 06/30/24 1015   BP: 150/76 115/62   Pulse: 98 83         Visual Acuity      ED Medications  Medications   sodium chloride 0.9 % bolus 1,000 mL (0 mL Intravenous Stopped 6/30/24 0948)   metoclopramide (REGLAN) injection 10 mg (10 mg Intravenous Given 6/30/24 0847)   diphenhydrAMINE (BENADRYL) injection 25 mg (25 mg Intravenous Given 6/30/24 0847)       Diagnostic Studies  Results Reviewed       Procedure Component Value Units Date/Time    FLU/RSV/COVID - if FLU/RSV clinically relevant [[id number]]  (Normal) Collected: 06/30/24 0835    Lab Status: Final result Specimen: Nares from Nose Updated: 06/30/24 0921     SARS-CoV-2 Negative     INFLUENZA A PCR Negative     INFLUENZA B PCR Negative     RSV PCR Negative    Narrative:      FOR PEDIATRIC PATIENTS - copy/paste COVID Guidelines URL to browser: [email or web address]    SARS-CoV-2 assay is a Nucleic Acid Amplification assay intended for the  qualitative detection of nucleic acid from SARS-CoV-2 in nasopharyngeal  swabs. Results are for the presumptive identification of SARS-CoV-2 RNA.    Positive results are indicative of infection with SARS-CoV-2, the virus  causing COVID-19, but do not rule out bacterial infection or co-infection  with other viruses. Laboratories within the [place] and its  territories are required to  report all positive results to the appropriate  public health authorities. Negative results do not preclude SARS-CoV-2  infection and should not be used as the sole basis for treatment or other  patient management decisions. Negative results must be combined with  clinical observations, patient history, and epidemiological information.  This test has not been FDA cleared or approved.    This test has been authorized by FDA under an Emergency Use Authorization  (EUA). This test is only authorized for the duration of time the  declaration that circumstances exist justifying the authorization of the  emergency use of an in vitro diagnostic tests for detection of SARS-CoV-2  virus and/or diagnosis of COVID-19 infection under section 564(b)(1) of  the Act, 21 U.S.C. 360bbb-3(b)(1), unless the authorization is terminated  or revoked sooner. The test has been validated but independent review by FDA  and CLIA is pending.    Test performed using [employer] GeneXpert: This RT-PCR assay targets N2,  a region unique to SARS-CoV-2. A conserved region in the E-gene was chosen  for pan-Sarbecovirus detection which includes SARS-CoV-2.    According to CMS-2020-01-R, this platform meets the definition of high-throughput technology.    Comprehensive metabolic panel [[id number]] Collected: 06/30/24 0835    Lab Status: Final result Specimen: Blood from Arm, Right Updated: 06/30/24 0901     Sodium 136 mmol/L      Potassium 3.8 mmol/L      Chloride 101 mmol/L      CO2 27 mmol/L      ANION GAP 8 mmol/L      BUN 8 mg/dL      Creatinine 0.72 mg/dL      Glucose 93 mg/dL      Calcium 9.6 mg/dL      AST 15 U/L      ALT 16 U/L      Alkaline Phosphatase 52 U/L      Total Protein 8.0 g/dL      Albumin 4.6 g/dL      Total Bilirubin 0.63 mg/dL      eGFR 111 ml/min/1.73sq m     Narrative:      National Kidney Disease Foundation guidelines for Chronic Kidney Disease (CKD):     Stage 1 with normal or high GFR (GFR > 90 mL/min/1.73 square meters)    Stage  2 Mild CKD (GFR = 60-89 mL/min/1.73 square meters)    Stage 3A Moderate CKD (GFR = 45-59 mL/min/1.73 square meters)    Stage 3B Moderate CKD (GFR = 30-44 mL/min/1.73 square meters)    Stage 4 Severe CKD (GFR = 15-29 mL/min/1.73 square meters)    Stage 5 End Stage CKD (GFR <15 mL/min/1.73 square meters)  Note: GFR calculation is accurate only with a steady state creatinine    Urine Microscopic [[id number]]  (Normal) Collected: 06/30/24 0834    Lab Status: Final result Specimen: Urine, Clean Catch Updated: 06/30/24 0854     RBC, UA None Seen /hpf      WBC, UA 1-2 /hpf      Epithelial Cells Occasional /hpf      Bacteria, UA Occasional /hpf     UA w Reflex to Microscopic w Reflex to Culture [[id number]]  (Abnormal) Collected: 06/30/24 0834    Lab Status: Final result Specimen: Urine, Clean Catch Updated: 06/30/24 0851     Color, UA Yellow     Clarity, UA Clear     Specific Gravity, UA 1.015     pH, UA 7.5     Leukocytes, UA Small     Nitrite, UA Negative     Protein, UA Trace mg/dl      Glucose, UA Negative mg/dl      Ketones, UA Negative mg/dl      Urobilinogen, UA <2.0 mg/dl      Bilirubin, UA Negative     Occult Blood, UA Negative    CBC and differential [[id number]]  (Abnormal) Collected: 06/30/24 0835    Lab Status: Final result Specimen: Blood from Arm, Right Updated: 06/30/24 0844     WBC 10.81 Thousand/uL      RBC 5.25 Million/uL      Hemoglobin 14.9 g/dL      Hematocrit 45.4 %      MCV 87 fL      MCH 28.4 pg      MCHC 32.8 g/dL      RDW 12.1 %      MPV 9.1 fL      Platelets 350 Thousands/uL      nRBC 0 /100 WBCs      Segmented % 71 %      Immature Grans % 1 %      Lymphocytes % 23 %      Monocytes % 4 %      Eosinophils Relative 1 %      Basophils Relative 0 %      Absolute Neutrophils 7.70 Thousands/µL      Absolute Immature Grans 0.07 Thousand/uL      Absolute Lymphocytes 2.49 Thousands/µL      Absolute Monocytes 0.44 Thousand/µL      Eosinophils Absolute 0.07 Thousand/µL      Basophils Absolute 0.04  Thousands/µL     Lyme Total AB W Reflex to IGM/IGG [[id number]] Collected: 06/30/24 0835    Lab Status: In process Specimen: Blood from Arm, Right Updated: 06/30/24 0840    Narrative:      The following orders were created for panel order Lyme Total AB W Reflex to IGM/IGG.  Procedure                               Abnormality         Status                     ---------                               -----------         ------                     Lyme Total AB W Reflex t...[[id number]]                      In process                   Please view results for these tests on the individual orders.    Lyme Total AB W Reflex to IGM/IGG [[id number]] Collected: 06/30/24 0835    Lab Status: In process Specimen: Blood from Arm, Right Updated: 06/30/24 0840    POCT pregnancy, urine [[id number]]  (Normal) Resulted: 06/30/24 0838    Lab Status: Edited Result - FINAL Updated: 06/30/24 0838     EXT Preg Test, Ur Negative     Control Valid                   CT head without contrast   Final Result by Edy Marshall MD (06/30 0943)      No acute intracranial abnormality.                  Workstation performed: [id number]                    Procedures  Procedures         ED Course  ED Course as of 06/30/24 1709   Sun Jun 30, 2024   1013 Patient is feeling better nontoxic-appearing reviewed CAT scan and lab studies.  At this time I have a low clinical suspicion for bacterial meningitis.  Did discuss the risks and benefits with the patient of a lumbar puncture at this time she would like to wait on the procedure if she worsens she will return                                             Medical Decision Making  Frontal headache rule out muscle tension headache or migraine sinusitis.  Low clinical suspicion for meningitis no meningeal signs present nontoxic-appearing neck is supple    Amount and/or Complexity of Data Reviewed  Labs: ordered.  Radiology: ordered.    Risk  Prescription drug management.             Disposition  Final  diagnoses:   Headache   Viral syndrome     Time reflects when diagnosis was documented in both MDM as applicable and the Disposition within this note       Time User Action Codes Description Comment    6/30/2024 10:17 AM Ricardo De Souza [R51.9] Headache     6/30/2024 10:17 AM Ricardo De Souza [B34.9] Viral syndrome           ED Disposition       ED Disposition   Discharge    Condition   Stable    Date/Time   Sun Jun 30, 2024 10:17 AM    Comment   Deyanira ROSS Gómez discharge to home/self care.                   Follow-up Information       Follow up With Specialties Details Why Contact Info Additional Information    Terence Clements MD Family Medicine In 2 days  [address] [place] [id number]  [phone]        [facility] Emergency Department Emergency Medicine  As needed, If symptoms worsen [address] [facility] [address] [phone] [facility] Emergency Department, [address] [place], [place] [address]            Discharge Medication List as of 6/30/2024 10:19 AM        CONTINUE these medications which have NOT CHANGED    Details   !! Carole FE 1/20 1-20 MG-MCG per tablet TAKE 1 TABLET DAILY, Starting Mon 7/31/2023, Normal      !! norethindrone-ethinyl estradiol (JUNEL FE 1/20) 1-20 MG-MCG per tablet Take 1 tablet by mouth daily, Starting Mon 5/6/2024, Normal       !! - Potential duplicate medications found. Please discuss with provider.          No discharge procedures on file.    PDMP Review       None            ED Provider  Electronically Signed by             Ricardo De Souza DO  06/30/24 3741

## 2024-07-01 LAB — B BURGDOR IGG+IGM SER QL IA: NEGATIVE

## 2024-08-27 ENCOUNTER — TELEPHONE (OUTPATIENT)
Dept: FAMILY MEDICINE CLINIC | Facility: CLINIC | Age: 34
End: 2024-08-27

## 2024-08-31 NOTE — TELEPHONE ENCOUNTER
08/30/24 11:17 PM        The office's request has been received, reviewed, and the patient chart updated. The PCP has successfully been removed with a patient attribution note. This message will now be completed.        Thank you  Arin Eldridge

## 2024-09-11 ENCOUNTER — ULTRASOUND (OUTPATIENT)
Dept: OBGYN CLINIC | Facility: CLINIC | Age: 34
End: 2024-09-11
Payer: COMMERCIAL

## 2024-09-11 VITALS
SYSTOLIC BLOOD PRESSURE: 120 MMHG | DIASTOLIC BLOOD PRESSURE: 60 MMHG | HEIGHT: 64 IN | BODY MASS INDEX: 28.65 KG/M2 | WEIGHT: 167.8 LBS

## 2024-09-11 DIAGNOSIS — N91.2 AMENORRHEA: Primary | ICD-10-CM

## 2024-09-11 PROCEDURE — 99213 OFFICE O/P EST LOW 20 MIN: CPT | Performed by: NURSE PRACTITIONER

## 2024-09-11 PROCEDURE — 76801 OB US < 14 WKS SINGLE FETUS: CPT | Performed by: NURSE PRACTITIONER

## 2024-09-11 RX ORDER — PNV NO.95/FERROUS FUM/FOLIC AC 28MG-0.8MG
TABLET ORAL
COMMUNITY

## 2024-09-11 NOTE — PROGRESS NOTES
[facility] [place]  [address] [place] [address]    Assessment/Plan:  1. Amenorrhea  -     AMB  OB < 14 weeks single or first gestation level 1  -     Ambulatory Referral to Maternal Fetal Medicine; Future; Expected date: 2024  EDC 2025, 8 weeks 2 days gestation, based on LMP and confirmed by ultrasound at today's visit  Reviewed safe meds list, MFM referral for NT  Return visit in 2 weeks for OB intake  Call with any concerns    Subjective:   Deyanira Gómez is a 34 y.o.  female.    HPI:   34 year old  presents for office visit with amenorrhea and + HPT. LMP 7/15 placing her at 8 weeks 2 days gestation. Reports regular, monthly cycles. Feeling well today with no complaints. Not having any pain or bleeding, did previously have some random spotting. She is taking a prenatal vitamin.         Gyn History  Patient's last menstrual period was 07/15/2024.       Last pap smear: 2024    She  reports being sexually active and has had partner(s) who are male.       OB History      Past Medical History:  No date: Elevated blood pressure reading      Comment:  had increase BP with last pregnancy , now normal                readings  No date: Hypertension  No date: Varicella      Comment:  childhood disease around 3 years old      Past Surgical History:  No date:  SECTION  2016: [place]  DELIVERY ONLY; N/A      Comment:  Procedure:  SECTION ();  Surgeon: Memo Chun MD;  Location: [facility] ;  Service: Obstetrics     Social History     Tobacco Use    Smoking status: Never     Passive exposure: Never    Smokeless tobacco: Never   Substance Use Topics    Alcohol use: No    Drug use: No          Current Outpatient Medications:     Prenatal Vit-Fe Fumarate-FA (Prenatal Vitamin) 27-0.8 MG TABS, Take by mouth, Disp: , Rfl:     Carole FE  1-20 MG-MCG per tablet, TAKE 1 TABLET DAILY (Patient not taking: Reported on 2024),  "Disp: 84 tablet, Rfl: 3    norethindrone-ethinyl estradiol (JUNEL FE 1/20) 1-20 MG-MCG per tablet, Take 1 tablet by mouth daily (Patient not taking: Reported on 9/11/2024), Disp: 84 tablet, Rfl: 3    She is allergic to latex..    ROS: Review of Systems See HPI for details, all other systems are negative.     Objective:  /60 (BP Location: Left arm, Patient Position: Sitting, Cuff Size: Standard)   Ht 5' 4\" (1.626 m)   Wt 76.1 kg (167 lb 12.8 oz)   LMP 07/15/2024   Breastfeeding Unknown   BMI 28.80 kg/m²      Physical Exam  Constitutional:       General: She is not in acute distress.     Appearance: Normal appearance. She is not ill-appearing.   Abdominal:      General: There is no distension.      Palpations: Abdomen is soft.      Tenderness: There is no abdominal tenderness.   Skin:     General: Skin is warm and dry.   Neurological:      Mental Status: She is alert.   Psychiatric:         Thought Content: Thought content normal.       Transabdominal ultrasound demonstrates a SIUP measuring 7 weeks 5 days by CRL, +CM noted at 155 bpm.   "

## 2024-09-19 NOTE — PROGRESS NOTES
OB INTAKE INTERVIEW  Patient is 34 y.o. who presents for OB intake at 10wks  She is accompanied by herself during this encounter  The father of her baby (Kalin) is involved in the pregnancy and is 33 years old.      Last Menstrual Period: 07/15/2024  Stopped birth control May 2024, reports cycles were regular, every 28 days off birth control prior to conceiving.  Ultrasound: Measured 7 weeks 5 days on 24  Estimated Date of Delivery: 2024 dating based upon LMP     Signs/Symptoms of Pregnancy  Current pregnancy symptoms:   Nausea and vomiting - Recommended to Try to eat 5-6 small meals a day, increase protein with meals/snacks, in order to keep stomach full at all times. Try bland foods like plain crackers, toast as well as carbonated drinks like gingerale. Hard peppermint or ephraim candy, is a natural treatment for nausea,  B-javier pops  with B6 ( available at the pharmacy), B6 25 mg in the AM and 25 mg in PM. May combine with Unisom 12.5 mg at night. Unisom may cause drowsiness.  High complex carbohydrate snack  before bedtime.   Constipation no  Headaches no  Cramping/spotting no  PICA cravings no    Diabetes-  There is no height or weight on file to calculate BMI.  If patient has 1 or more, please order early 1 hour GTT  History of GDM no  BMI >35 no  History of PCOS or current metformin use no  History of LGA/macrosomic infant (4000g/9lbs) no    If patient has 2 or more, please order early 1 hour GTT  BMI>30 no  AMA no  First degree relative with type 2 diabetes no  History of chronic HTN, hyperlipidemia, elevated A1C no  High risk race (, , ,  or ) no    Hypertension- if you answer yes to any of the following, please order baseline preeclampsia labs (cbc, comprehensive metabolic panel, urine protein creatinine ratio, uric acid)  History of of chronic HTN Reports she does not have chronic Hypertension and is not on medication   History of  gestational HTN YES, first pregnancy-third trimester    History of preeclampsia, eclampsia, or HELLP syndrome no  History of diabetes no  History of lupus, autoimmune disease, kidney disease no    Thyroid- if yes order TSH with reflex T4  History of thyroid disease no    Bleeding Disorder or Hx of DVT-patient or first degree relative with history of. Order the following if not done previously.   (Factor V, antithrombin III, prothrombin gene mutation, protein C and S Ag, lupus anticoagulant, anticardiolipin, beta-2 glycoprotein)   Denies     OB/GYN-  History of abnormal pap smear no       Date of last pap smear 2024  History of HPV no  History of Herpes/HSV no  History of other STI (gonorrhea, chlamydia, trich) no  History of prior  YES, VAVD   History of prior  YES, 2016  History of  delivery prior to 36 weeks 6 days no  Delivered second child at 37w2d   History of blood transfusion no  Ok for blood transfusion YES    Substance screening-   History of tobacco use no  Currently using tobacco no  Substance Use Screen Level  No Risk     MRSA Screening-   Does the pt have a hx of MRSA? no    Immunizations:  Influenza vaccine given this season No  Discussed Tdap vaccine yes  Discussed COVID Vaccine yes    Genetic/MFM-  Do you or your partner have a history of any of the following in yourselves or first degree relatives?  Cystic fibrosis no  Spinal muscular atrophy no  Hemoglobinopathy/Sickle Cell/Thalassemia no  Fragile X Intellectual Disability no    If yes, discuss Carrier Screening and recommend consultation with MFM/Genetic Counseling and place specific [facility] Referral for.    If no, discuss Carrier Screening being completed once in a lifetime as a standard of care lab test. Place orders for Cystic Fibrosis Gene Test (IEA905) and Spinal Muscular Atrophy DNA (WNM0592)  Declined carrier screening.     Appointment for Nuchal Translucency Ultrasound at [facility] scheduled for 10/7/2024   Desires  genetic screening and early anatomy scan  Son was born with Down Syndrome, VSD and parachute mitral valve     Interview education  [facility] Pregnancy Essentials Book reviewed, discussed and attached to their AVS yes      Ambulatory Referral to  placed  EPDS: 2    Prenatal lab work scripts YES  Preferred Lab: [facility] lab   Extra labs ordered:  Pre-eclampsia panel     Aspirin/Preeclampsia Screen    Risk Level Risk Factor Recommendation   LOW Prior Uncomplicated full-term delivery YES No Aspirin recommendation        MODERATE Nulliparity no Recommend low-dose aspirin if     BMI>30 no 2 or more moderate risk factors    Family History Preeclampsia (mother/sister) no     35yr old or greater no     Black Race, Concern for SDOH/Low Socioeconomic no     IVF Pregnancy  no     Personal History Risks (low birth weight, prior adverse preg outcome, >10yr preg interval) no         HIGH History of Preeclampsia no Recommend low-dose aspirin if     Multifetal gestation no 1 or more high risk factors    Chronic HTN no     Type 1 or 2 Diabetes no     Renal Disease no     Autoimmune Disease  no      Contraindications to ASA therapy:  NSAID/ ASA allergy: no  Nasal polyps: no  Asthma with history of ASA induced bronchospasm: no  Relative contraindications:  History of GI bleed: no  Active peptic ulcer disease: no  Severe hepatic dysfunction: no    Patient should be recommended to take ASA 162mg during this pregnancy from 12-36wks to lower her risk of preeclampsia:   Low Risk       The patient has a history now or in prior pregnancy notable for:  See below      Details that I feel the provider should be aware of:   Deyanira a current patient to [facility]. This is her third pregnancy. Her pregnancy history includes:  Prior LTCS (2016)- recurrent variable decelerations.   With Elevated BP reading in third trimeter with (2016) pregnancy.  (Pre-eclampsia panel ordered)  2018 pregnancy-TOLAC @ 37.2 weeks-vacuum delivery for  suspicion of fetal compromise, FHTwere category II with deep variable decelerations.  Son was born with Down Syndrome,diagnosed with VSD and parachute mitral valve     Past medical History includes:   Pt denies history of chronic HTN-pt reports she had elevated BP reading in her first pregnancy-Third trimester. Reports normal BP reading during second pregnancy.      PN1 visit scheduled. The patient was oriented to our practice, the navigator role, reviewed delivering physicians and [facility] for Delivery. All questions were answered.    Interviewed by: HOLLAND Schumacher RN

## 2024-09-19 NOTE — PATIENT INSTRUCTIONS
Giovanni Gómez,     It was so nice getting to know you today. Remember if you have an urgent or time sensitive concern, please call the practice phone number so a clinical triage team member can review your symptoms and get in touch with our on call provider if necessary. If you have general questions or need help navigating our services please REPLY to this message so it comes directly to me and I will respond between other patients. If I am out of the office for any reason, another nurse navigator may reach out to help you. Our Pregnancy Essential Guide is a great online resource--please use the link below.     [facility] Pregnancy Essentials Guide  [facility] ([email or web address])     Again, Congratulations and Thank You for choosing [facility]. I look forward to helping you through this journey.

## 2024-09-23 ENCOUNTER — INITIAL PRENATAL (OUTPATIENT)
Dept: OBGYN CLINIC | Facility: CLINIC | Age: 34
End: 2024-09-23

## 2024-09-23 ENCOUNTER — PATIENT OUTREACH (OUTPATIENT)
Dept: OBGYN CLINIC | Facility: CLINIC | Age: 34
End: 2024-09-23

## 2024-09-23 VITALS
SYSTOLIC BLOOD PRESSURE: 118 MMHG | WEIGHT: 167 LBS | BODY MASS INDEX: 28.51 KG/M2 | HEIGHT: 64 IN | DIASTOLIC BLOOD PRESSURE: 60 MMHG

## 2024-09-23 DIAGNOSIS — Z36.89 ENCOUNTER FOR OTHER SPECIFIED ANTENATAL SCREENING: ICD-10-CM

## 2024-09-23 DIAGNOSIS — Z87.59 HISTORY OF GESTATIONAL HYPERTENSION: ICD-10-CM

## 2024-09-23 DIAGNOSIS — Z34.81 PRENATAL CARE, SUBSEQUENT PREGNANCY, FIRST TRIMESTER: Primary | ICD-10-CM

## 2024-09-23 DIAGNOSIS — Z3A.10 10 WEEKS GESTATION OF PREGNANCY: ICD-10-CM

## 2024-09-23 PROCEDURE — OBC: Performed by: NURSE PRACTITIONER

## 2024-09-23 NOTE — PROGRESS NOTES
SW referred for initial prenatal assessment. Patient is , [id number] with an LEONCIO of 25 Patient agreeable to meeting with SW today.    Patient reports this is a planned pregnancy. She and FOB ( Kalin) both work and drive. Patient denies needing information for MA/WIC/SNAP, parenting education, or baby supply resources today.    Patient denies current or h/o mental health, substance use, DV/IPV, CYS Involvement, and legal concerns. Patient repotrs good support from FOB and her mom. Patient enjoys running and reading for stress relief    No SW needs identified at this time, SW closing referral. Please re-refer as needed.

## 2024-09-25 ENCOUNTER — APPOINTMENT (OUTPATIENT)
Dept: LAB | Facility: CLINIC | Age: 34
End: 2024-09-25
Payer: COMMERCIAL

## 2024-09-25 DIAGNOSIS — Z87.59 HISTORY OF GESTATIONAL HYPERTENSION: ICD-10-CM

## 2024-09-25 DIAGNOSIS — Z34.81 PRENATAL CARE, SUBSEQUENT PREGNANCY, FIRST TRIMESTER: ICD-10-CM

## 2024-09-25 LAB
ABO GROUP BLD: NORMAL
ALBUMIN SERPL BCG-MCNC: 4.1 G/DL (ref 3.5–5)
ALP SERPL-CCNC: 35 U/L (ref 34–104)
ALT SERPL W P-5'-P-CCNC: 10 U/L (ref 7–52)
AMORPH URATE CRY URNS QL MICRO: NORMAL
ANION GAP SERPL CALCULATED.3IONS-SCNC: 12 MMOL/L (ref 4–13)
AST SERPL W P-5'-P-CCNC: 15 U/L (ref 13–39)
BACTERIA UR QL AUTO: NORMAL /HPF
BASOPHILS # BLD AUTO: 0.02 THOUSANDS/ΜL (ref 0–0.1)
BASOPHILS NFR BLD AUTO: 0 % (ref 0–1)
BILIRUB SERPL-MCNC: 0.52 MG/DL (ref 0.2–1)
BILIRUB UR QL STRIP: NEGATIVE
BLD GP AB SCN SERPL QL: NEGATIVE
BUN SERPL-MCNC: 6 MG/DL (ref 5–25)
CALCIUM SERPL-MCNC: 8.9 MG/DL (ref 8.4–10.2)
CHLORIDE SERPL-SCNC: 102 MMOL/L (ref 96–108)
CLARITY UR: ABNORMAL
CO2 SERPL-SCNC: 23 MMOL/L (ref 21–32)
COLOR UR: ABNORMAL
CREAT SERPL-MCNC: 0.51 MG/DL (ref 0.6–1.3)
CREAT UR-MCNC: 113.6 MG/DL
EOSINOPHIL # BLD AUTO: 0.15 THOUSAND/ΜL (ref 0–0.61)
EOSINOPHIL NFR BLD AUTO: 2 % (ref 0–6)
ERYTHROCYTE [DISTWIDTH] IN BLOOD BY AUTOMATED COUNT: 12.6 % (ref 11.6–15.1)
GFR SERPL CREATININE-BSD FRML MDRD: 126 ML/MIN/1.73SQ M
GLUCOSE P FAST SERPL-MCNC: 80 MG/DL (ref 65–99)
GLUCOSE UR STRIP-MCNC: NEGATIVE MG/DL
HBV SURFACE AB SER-ACNC: 8.21 MIU/ML
HBV SURFACE AG SER QL: NORMAL
HCT VFR BLD AUTO: 41.2 % (ref 34.8–46.1)
HCV AB SER QL: NORMAL
HGB BLD-MCNC: 13.9 G/DL (ref 11.5–15.4)
HGB UR QL STRIP.AUTO: NEGATIVE
HIV 1+2 AB+HIV1 P24 AG SERPL QL IA: NORMAL
HIV 2 AB SERPL QL IA: NORMAL
HIV1 AB SERPL QL IA: NORMAL
HIV1 P24 AG SERPL QL IA: NORMAL
IMM GRANULOCYTES # BLD AUTO: 0.04 THOUSAND/UL (ref 0–0.2)
IMM GRANULOCYTES NFR BLD AUTO: 1 % (ref 0–2)
KETONES UR STRIP-MCNC: NEGATIVE MG/DL
LEUKOCYTE ESTERASE UR QL STRIP: ABNORMAL
LYMPHOCYTES # BLD AUTO: 2.17 THOUSANDS/ΜL (ref 0.6–4.47)
LYMPHOCYTES NFR BLD AUTO: 30 % (ref 14–44)
MCH RBC QN AUTO: 29.4 PG (ref 26.8–34.3)
MCHC RBC AUTO-ENTMCNC: 33.7 G/DL (ref 31.4–37.4)
MCV RBC AUTO: 87 FL (ref 82–98)
MONOCYTES # BLD AUTO: 0.42 THOUSAND/ΜL (ref 0.17–1.22)
MONOCYTES NFR BLD AUTO: 6 % (ref 4–12)
NEUTROPHILS # BLD AUTO: 4.4 THOUSANDS/ΜL (ref 1.85–7.62)
NEUTS SEG NFR BLD AUTO: 61 % (ref 43–75)
NITRITE UR QL STRIP: NEGATIVE
NON-SQ EPI CELLS URNS QL MICRO: NORMAL /HPF
NRBC BLD AUTO-RTO: 0 /100 WBCS
PH UR STRIP.AUTO: 8 [PH]
PLATELET # BLD AUTO: 270 THOUSANDS/UL (ref 149–390)
PMV BLD AUTO: 9.7 FL (ref 8.9–12.7)
POTASSIUM SERPL-SCNC: 3.8 MMOL/L (ref 3.5–5.3)
PROT SERPL-MCNC: 6.7 G/DL (ref 6.4–8.4)
PROT UR STRIP-MCNC: ABNORMAL MG/DL
PROT UR-MCNC: 10 MG/DL
PROT/CREAT UR: 0.1 MG/G{CREAT} (ref 0–0.1)
RBC # BLD AUTO: 4.72 MILLION/UL (ref 3.81–5.12)
RBC #/AREA URNS AUTO: NORMAL /HPF
RH BLD: POSITIVE
RUBV IGG SERPL IA-ACNC: 24.1 IU/ML
SODIUM SERPL-SCNC: 137 MMOL/L (ref 135–147)
SP GR UR STRIP.AUTO: 1.01 (ref 1–1.03)
SPECIMEN EXPIRATION DATE: NORMAL
TREPONEMA PALLIDUM IGG+IGM AB [PRESENCE] IN SERUM OR PLASMA BY IMMUNOASSAY: NORMAL
URATE SERPL-MCNC: 3.4 MG/DL (ref 2–7.5)
UROBILINOGEN UR STRIP-ACNC: <2 MG/DL
WBC # BLD AUTO: 7.2 THOUSAND/UL (ref 4.31–10.16)
WBC #/AREA URNS AUTO: NORMAL /HPF

## 2024-09-25 PROCEDURE — 86780 TREPONEMA PALLIDUM: CPT

## 2024-09-25 PROCEDURE — 80053 COMPREHEN METABOLIC PANEL: CPT

## 2024-09-25 PROCEDURE — 86850 RBC ANTIBODY SCREEN: CPT

## 2024-09-25 PROCEDURE — 85025 COMPLETE CBC W/AUTO DIFF WBC: CPT

## 2024-09-25 PROCEDURE — 86803 HEPATITIS C AB TEST: CPT

## 2024-09-25 PROCEDURE — 87086 URINE CULTURE/COLONY COUNT: CPT

## 2024-09-25 PROCEDURE — 86762 RUBELLA ANTIBODY: CPT

## 2024-09-25 PROCEDURE — 87389 HIV-1 AG W/HIV-1&-2 AB AG IA: CPT

## 2024-09-25 PROCEDURE — 86706 HEP B SURFACE ANTIBODY: CPT

## 2024-09-25 PROCEDURE — 86900 BLOOD TYPING SEROLOGIC ABO: CPT

## 2024-09-25 PROCEDURE — 36415 COLL VENOUS BLD VENIPUNCTURE: CPT

## 2024-09-25 PROCEDURE — 86901 BLOOD TYPING SEROLOGIC RH(D): CPT

## 2024-09-25 PROCEDURE — 84550 ASSAY OF BLOOD/URIC ACID: CPT

## 2024-09-25 PROCEDURE — 87340 HEPATITIS B SURFACE AG IA: CPT

## 2024-09-25 PROCEDURE — 82570 ASSAY OF URINE CREATININE: CPT

## 2024-09-25 PROCEDURE — 81001 URINALYSIS AUTO W/SCOPE: CPT

## 2024-09-25 PROCEDURE — 84156 ASSAY OF PROTEIN URINE: CPT

## 2024-09-26 LAB — BACTERIA UR CULT: NORMAL

## 2024-10-06 PROBLEM — Z98.891 HISTORY OF VBAC: Status: ACTIVE | Noted: 2024-10-06

## 2024-10-06 PROBLEM — O34.219 PREGNANCY WITH HISTORY OF CESAREAN SECTION, ANTEPARTUM: Status: ACTIVE | Noted: 2024-10-06

## 2024-10-07 ENCOUNTER — ROUTINE PRENATAL (OUTPATIENT)
Dept: PERINATAL CARE | Facility: OTHER | Age: 34
End: 2024-10-07
Payer: COMMERCIAL

## 2024-10-07 VITALS
HEIGHT: 64 IN | BODY MASS INDEX: 28.13 KG/M2 | WEIGHT: 164.8 LBS | DIASTOLIC BLOOD PRESSURE: 60 MMHG | HEART RATE: 79 BPM | SYSTOLIC BLOOD PRESSURE: 114 MMHG

## 2024-10-07 DIAGNOSIS — Z36.82 ENCOUNTER FOR ANTENATAL SCREENING FOR NUCHAL TRANSLUCENCY: ICD-10-CM

## 2024-10-07 DIAGNOSIS — O09.299 CURRENT SINGLETON PREGNANCY WITH HISTORY OF CONGENITAL ANOMALY IN PRIOR CHILD, ANTEPARTUM: Primary | ICD-10-CM

## 2024-10-07 DIAGNOSIS — Z82.79 FAMILY HISTORY OF AUTOSOMAL ANEUPLOIDY: ICD-10-CM

## 2024-10-07 DIAGNOSIS — Z98.891 HISTORY OF VBAC: ICD-10-CM

## 2024-10-07 DIAGNOSIS — Z82.79 FAMILY HISTORY OF DOWN SYNDROME: ICD-10-CM

## 2024-10-07 DIAGNOSIS — N91.2 AMENORRHEA: ICD-10-CM

## 2024-10-07 DIAGNOSIS — O34.219 PREGNANCY WITH HISTORY OF CESAREAN SECTION, ANTEPARTUM: ICD-10-CM

## 2024-10-07 DIAGNOSIS — Z36.0 ENCOUNTER FOR ANTENATAL SCREENING FOR CHROMOSOMAL ANOMALIES: ICD-10-CM

## 2024-10-07 DIAGNOSIS — Z3A.12 12 WEEKS GESTATION OF PREGNANCY: ICD-10-CM

## 2024-10-07 PROCEDURE — 76813 OB US NUCHAL MEAS 1 GEST: CPT | Performed by: OBSTETRICS & GYNECOLOGY

## 2024-10-07 PROCEDURE — 36415 COLL VENOUS BLD VENIPUNCTURE: CPT | Performed by: OBSTETRICS & GYNECOLOGY

## 2024-10-07 PROCEDURE — 99203 OFFICE O/P NEW LOW 30 MIN: CPT | Performed by: OBSTETRICS & GYNECOLOGY

## 2024-10-07 NOTE — PROGRESS NOTES
Patient chose to have [facility] [id number] Non-Invasive Prenatal Screen [id number] PLUS w/ SCA, WITH fetal sex (per pt request).  Patient choose to be billed through insurance.     Patient given brochure and is aware [facility] will contact patient's insurance and coordinate coverage.  Provided [facility] contact information. General inquiries [phone], Cost estimates [phone] and [facility] Billing [phone]. Website [email or web address].     Blood collection tubes labeled with patient identifiers (name, medical record number, and date of birth).     Filled out [facility] order form. Patient chose to have blood drawn in our office at time of visit. NIPS was drawn from right arm with a butterfly needle by RL Hernandez MA. .      If patient chose to have blood work drawn at a [facility] lab we requested patient notify MFM (via phone call or [employer]rock[employer] message) when blood collected so office can follow up on results.       Maternal Fetal Medicine will have results in approximately 5-7 business days and will call patient or notify via [employer]rock[employer].  Patient aware viewing lab result online will reveal fetal sex if ordered.    Patient verbalized understanding of all instructions and no questions at this time.   \

## 2024-10-07 NOTE — LETTER
"   Date: 10/7/2024    EVA Roberts  [address] [address]    Patient: Deyanira Gómez   YOB: 1990   Date of Visit: 10/7/2024   Gestational age 12w3d   Nature of this communication: Routine       This patient was seen recently in our  office.  Please see ultrasound report under \"OB Procedures\" tab.  Please don't hesitate to contact our office with any concerns or questions.      Sincerely,      Dana Yeh MD  Attending Physician, Maternal-Fetal Medicine  [facility]      "

## 2024-10-09 ENCOUNTER — INITIAL PRENATAL (OUTPATIENT)
Dept: OBGYN CLINIC | Facility: CLINIC | Age: 34
End: 2024-10-09
Payer: COMMERCIAL

## 2024-10-09 VITALS
WEIGHT: 168.4 LBS | DIASTOLIC BLOOD PRESSURE: 68 MMHG | BODY MASS INDEX: 28.06 KG/M2 | SYSTOLIC BLOOD PRESSURE: 120 MMHG | HEIGHT: 65 IN

## 2024-10-09 DIAGNOSIS — Z36.1 NEED FOR MATERNAL SERUM ALPHA-PROTEIN (MSAFP) SCREENING: ICD-10-CM

## 2024-10-09 DIAGNOSIS — Z82.79 FAMILY HISTORY OF DOWN SYNDROME: ICD-10-CM

## 2024-10-09 DIAGNOSIS — Z98.891 HISTORY OF VBAC: ICD-10-CM

## 2024-10-09 DIAGNOSIS — Z3A.12 12 WEEKS GESTATION OF PREGNANCY: Primary | ICD-10-CM

## 2024-10-09 DIAGNOSIS — O34.219 PREGNANCY WITH HISTORY OF CESAREAN SECTION, ANTEPARTUM: ICD-10-CM

## 2024-10-09 LAB
SL AMB  POCT GLUCOSE, UA: NEGATIVE
SL AMB POCT URINE PROTEIN: NEGATIVE

## 2024-10-09 PROCEDURE — 87591 N.GONORRHOEAE DNA AMP PROB: CPT | Performed by: NURSE PRACTITIONER

## 2024-10-09 PROCEDURE — 87491 CHLMYD TRACH DNA AMP PROBE: CPT | Performed by: NURSE PRACTITIONER

## 2024-10-09 PROCEDURE — 81002 URINALYSIS NONAUTO W/O SCOPE: CPT | Performed by: NURSE PRACTITIONER

## 2024-10-09 PROCEDURE — PNV: Performed by: NURSE PRACTITIONER

## 2024-10-09 NOTE — PROGRESS NOTES
"Routine Prenatal Visit  [facility] - [place]  [address], [place] [address]    Assessment/Plan:  Deyanira is a 34 y.o. year old  at 12w2d who presents for routine prenatal visit.     1. 12 weeks gestation of pregnancy  -     POCT urine dip  -     Chlamydia/GC amplified DNA by PCR  2. Need for maternal serum alpha-protein (MSAFP) screening  -     Alpha fetoprotein, maternal; Future  3. Family history of Down syndrome  Assessment & Plan:  Son with Down's and VSD, diagnosed postnatally   NIPT done, pending, early anatomy scheduled, Fetal Echo @ 22 weeks  4. History of   Assessment & Plan:  Desires repeat  this pregnancy    5. Pregnancy with history of  section, antepartum      Next OB Visit 4 weeks.    Subjective:     CC: Prenatal care    Deyanira Gómez is a 34 y.o.  female who presents for routine prenatal care at 12w2d. Pt feels well today and has no complaints.   Pregnancy ROS: no leakage of fluid, pelvic pain, or vaginal bleeding.  no fetal movement yet.    The following portions of the patient's history were reviewed and updated as appropriate: allergies, current medications, past family history, past medical history, obstetric history, gynecologic history, past social history, past surgical history and problem list.      Objective:  /68   Ht 5' 4.5\" (1.638 m)   Wt 76.4 kg (168 lb 6.4 oz)   LMP 07/15/2024   BMI 28.46 kg/m²   Pregravid Weight/BMI: 73.9 kg (163 lb) (BMI 27.56)  Current Weight: 76.4 kg (168 lb 6.4 oz)   Total Weight Gain: 2.449 kg (5 lb 6.4 oz)   Pre-Kathy Vitals      Flowsheet Row Most Recent Value   Prenatal Assessment    Fetal Heart Rate 168   Movement Absent   Prenatal Vitals    Blood Pressure 120/68   Weight - Scale 76.4 kg (168 lb 6.4 oz)   Urine Albumin/Glucose    Dilation/Effacement/Station    Cervical Dilation 0   Cervical Effacement 0   Vaginal Drainage    Draining Fluid No   Edema    LLE Edema None   RLE Edema None           Prenatal " physical completed.  General: Well appearing, no distress  Abdomen: Soft, gravid, nontender  Extremities: Non tender.

## 2024-10-09 NOTE — PATIENT INSTRUCTIONS
Call the office with any concerns  Please go to the lab between 16-18 weeks for your AFP screening for neural tube defect.

## 2024-10-09 NOTE — ASSESSMENT & PLAN NOTE
Son with Down's and VSD, diagnosed postnatally   NIPT done, pending, early anatomy scheduled, Fetal Echo @ 22 weeks

## 2024-10-10 LAB
C TRACH DNA SPEC QL NAA+PROBE: NEGATIVE
N GONORRHOEA DNA SPEC QL NAA+PROBE: NEGATIVE

## 2024-10-10 NOTE — PROGRESS NOTES
"[facility]: Ms. Gómez was seen today for nuchal translucency ultrasound.  See ultrasound report under \"OB Procedures\" tab.      MDM:   I. Diagnoses/Problems addressed:  Self limited or minor problem: uncomplicated pregnancy  II.  Data: I ordered the following tests: NIPS.  III.  Risk of morbidity: Moderate    Please don't hesitate to contact our office with any concerns or questions.  -Dana Yeh MD      "

## 2024-10-11 LAB
CFDNA.FET/CFDNA.TOTAL SFR FETUS: NORMAL %
CITATION REF LAB TEST: NORMAL
FET 13+18+21+X+Y ANEUP PLAS.CFDNA: NEGATIVE
FET CHR 21 TS PLAS.CFDNA QL: NEGATIVE
FET CHR 21 TS PLAS.CFDNA QL: NEGATIVE
FET MS X RISK WBC.DNA+CFDNA QL: NOT DETECTED
FET SEX PLAS.CFDNA DOSAGE CFDNA: NORMAL
FET TS 13 RISK PLAS.CFDNA QL: NEGATIVE
FET X + Y ANEUP RISK PLAS.CFDNA SEQ-IMP: NOT DETECTED
GA EST FROM CONCEPTION DATE: NORMAL D
GESTATIONAL AGE > 9:: YES
LAB DIRECTOR NAME PROVIDER: NORMAL
LAB DIRECTOR NAME PROVIDER: NORMAL
LABORATORY COMMENT REPORT: NORMAL
LIMITATIONS OF THE TEST: NORMAL
NEGATIVE PREDICTIVE VALUE: NORMAL
PERFORMANCE CHARACTERISTICS: NORMAL
POSITIVE PREDICTIVE VALUE: NORMAL
REF LAB TEST METHOD: NORMAL
SL AMB NOTE:: NORMAL
TEST PERFORMANCE INFO SPEC: NORMAL

## 2024-11-05 PROBLEM — Z37.9 FORCEPS OR VACUUM EXTRACTOR DELIVERY: Status: ACTIVE | Noted: 2024-11-05

## 2024-11-05 PROBLEM — Z87.59 HISTORY OF GESTATIONAL HYPERTENSION: Status: ACTIVE | Noted: 2024-11-05

## 2024-11-05 PROBLEM — O09.299 HX OF DELIVERY BY VACUUM EXTRACTION, CURRENTLY PREGNANT: Status: ACTIVE | Noted: 2024-11-05

## 2024-11-05 PROBLEM — Z3A.16 16 WEEKS GESTATION OF PREGNANCY: Status: ACTIVE | Noted: 2024-11-05

## 2024-11-05 NOTE — PROGRESS NOTES
Routine Prenatal Visit  [facility] - [place]  [address] [place] [address]  Assessment & Plan  History of   2nd pregnancy   Desires TOLAC, will discuss risks/benefits/alternatives during subsequent visits        Pregnancy with history of  section, antepartum         16 weeks gestation of pregnancy  Low risk genetic testing   1st trimester US with MFM unremarkable   Level II US and fetal echocardiogram due to hx of child with Down's syndrome complicated by VSD, parachuting mitral valve, and bicuspid aortic valve scheduled for  and    Amenable to AFP, orders in place   RTC for 20 week OB visit     Orders:    POCT urine dip    History of gestational hypertension  Reports only 1 elevated BP in first pregnancy, unsure if she has met criteria for hypertensive disease   BP normotensive        Hx of delivery by vacuum extraction, currently pregnant  2nd pregnancy, due to fetal intolerance        Subjective:   Deyanira Gómez is a 34 y.o.  who presents for routine prenatal care at 16w1d.  Complaints today: none   LOF: no; VB: no; Contractions: no; FM: N/A    Objective:  /66   Wt 77 kg (169 lb 12.8 oz)   LMP 07/15/2024   BMI 28.70 kg/m²     General: Well appearing, no distress  Respiratory: Unlabored breathing  Cardiovascular: Regular rate.  Abdomen: Soft, gravid, nontender  Extremities: Warm and well perfused.  Non tender.    Pregravid Weight/BMI: 73.9 kg (163 lb) (BMI 27.56)  Current Weight: 77 kg (169 lb 12.8 oz)   Total Weight Gain: 3.084 kg (6 lb 12.8 oz)     Pre-Kathy Vitals      Flowsheet Row Most Recent Value   Prenatal Assessment    Fetal Heart Rate 145   Movement Absent   Prenatal Vitals    Blood Pressure 102/66   Weight - Scale 77 kg (169 lb 12.8 oz)   Urine Albumin/Glucose    Dilation/Effacement/Station    Vaginal Drainage    Edema              Eriberto Cochran MD  2024 8:51 AM

## 2024-11-05 NOTE — ASSESSMENT & PLAN NOTE
2nd pregnancy   Desires TOLAC, will discuss risks/benefits/alternatives during subsequent visits

## 2024-11-05 NOTE — ASSESSMENT & PLAN NOTE
Low risk genetic testing   1st trimester US with MFM unremarkable   Level II US and fetal echocardiogram due to hx of child with Down's syndrome complicated by VSD, parachuting mitral valve, and bicuspid aortic valve scheduled for 12/6 and 12/23   Amenable to AFP, orders in place   RTC for 20 week OB visit     Orders:    POCT urine dip

## 2024-11-05 NOTE — ASSESSMENT & PLAN NOTE
Reports only 1 elevated BP in first pregnancy, unsure if she has met criteria for hypertensive disease   BP normotensive

## 2024-11-06 ENCOUNTER — ROUTINE PRENATAL (OUTPATIENT)
Dept: OBGYN CLINIC | Facility: CLINIC | Age: 34
End: 2024-11-06
Payer: COMMERCIAL

## 2024-11-06 VITALS — DIASTOLIC BLOOD PRESSURE: 66 MMHG | WEIGHT: 169.8 LBS | SYSTOLIC BLOOD PRESSURE: 102 MMHG | BODY MASS INDEX: 28.7 KG/M2

## 2024-11-06 DIAGNOSIS — Z3A.16 16 WEEKS GESTATION OF PREGNANCY: ICD-10-CM

## 2024-11-06 DIAGNOSIS — Z87.59 HISTORY OF GESTATIONAL HYPERTENSION: ICD-10-CM

## 2024-11-06 DIAGNOSIS — O09.299 HX OF DELIVERY BY VACUUM EXTRACTION, CURRENTLY PREGNANT: ICD-10-CM

## 2024-11-06 DIAGNOSIS — Z82.79 FAMILY HISTORY OF DOWN SYNDROME: ICD-10-CM

## 2024-11-06 DIAGNOSIS — O34.219 PREGNANCY WITH HISTORY OF CESAREAN SECTION, ANTEPARTUM: ICD-10-CM

## 2024-11-06 DIAGNOSIS — Z98.891 HISTORY OF VBAC: Primary | ICD-10-CM

## 2024-11-06 LAB
SL AMB  POCT GLUCOSE, UA: NEGATIVE
SL AMB POCT URINE PROTEIN: NEGATIVE

## 2024-11-06 PROCEDURE — PNV: Performed by: STUDENT IN AN ORGANIZED HEALTH CARE EDUCATION/TRAINING PROGRAM

## 2024-11-06 PROCEDURE — 81002 URINALYSIS NONAUTO W/O SCOPE: CPT | Performed by: STUDENT IN AN ORGANIZED HEALTH CARE EDUCATION/TRAINING PROGRAM

## 2024-11-07 ENCOUNTER — ROUTINE PRENATAL (OUTPATIENT)
Dept: PERINATAL CARE | Facility: CLINIC | Age: 34
End: 2024-11-07
Payer: COMMERCIAL

## 2024-11-07 VITALS
HEIGHT: 64 IN | OXYGEN SATURATION: 98 % | DIASTOLIC BLOOD PRESSURE: 56 MMHG | HEART RATE: 97 BPM | SYSTOLIC BLOOD PRESSURE: 104 MMHG | BODY MASS INDEX: 29.3 KG/M2 | WEIGHT: 171.6 LBS

## 2024-11-07 DIAGNOSIS — Z36.3 ENCOUNTER FOR ANTENATAL SCREENING FOR MALFORMATION USING ULTRASOUND: ICD-10-CM

## 2024-11-07 DIAGNOSIS — Z3A.16 16 WEEKS GESTATION OF PREGNANCY: Primary | ICD-10-CM

## 2024-11-07 PROCEDURE — 76805 OB US >/= 14 WKS SNGL FETUS: CPT | Performed by: OBSTETRICS & GYNECOLOGY

## 2024-11-07 PROCEDURE — 99213 OFFICE O/P EST LOW 20 MIN: CPT | Performed by: OBSTETRICS & GYNECOLOGY

## 2024-11-07 NOTE — PROGRESS NOTES
The patient was seen today for an ultrasound.  Please see ultrasound report (located under Ob Procedures) for additional details.   Thank you very much for allowing us to participate in the care of this very nice patient.  Should you have any questions, please do not hesitate to contact me.     Ananda Rosas MD FACOG  Attending Physician, Maternal-Fetal Medicine  [facility]

## 2024-12-05 ENCOUNTER — APPOINTMENT (OUTPATIENT)
Dept: LAB | Facility: CLINIC | Age: 34
End: 2024-12-05
Payer: COMMERCIAL

## 2024-12-05 ENCOUNTER — ROUTINE PRENATAL (OUTPATIENT)
Dept: OBGYN CLINIC | Facility: CLINIC | Age: 34
End: 2024-12-05
Payer: COMMERCIAL

## 2024-12-05 VITALS
SYSTOLIC BLOOD PRESSURE: 118 MMHG | DIASTOLIC BLOOD PRESSURE: 68 MMHG | BODY MASS INDEX: 30.7 KG/M2 | HEIGHT: 64 IN | WEIGHT: 179.8 LBS

## 2024-12-05 DIAGNOSIS — Z23 NEED FOR INFLUENZA VACCINATION: ICD-10-CM

## 2024-12-05 DIAGNOSIS — O09.299 HX OF DELIVERY BY VACUUM EXTRACTION, CURRENTLY PREGNANT: ICD-10-CM

## 2024-12-05 DIAGNOSIS — Z3A.20 20 WEEKS GESTATION OF PREGNANCY: Primary | ICD-10-CM

## 2024-12-05 DIAGNOSIS — Z82.79 FAMILY HISTORY OF DOWN SYNDROME: ICD-10-CM

## 2024-12-05 DIAGNOSIS — Z3A.16 16 WEEKS GESTATION OF PREGNANCY: ICD-10-CM

## 2024-12-05 DIAGNOSIS — Z36.1 NEED FOR MATERNAL SERUM ALPHA-PROTEIN (MSAFP) SCREENING: ICD-10-CM

## 2024-12-05 DIAGNOSIS — Z87.59 HISTORY OF GESTATIONAL HYPERTENSION: ICD-10-CM

## 2024-12-05 DIAGNOSIS — O34.219 PREGNANCY WITH HISTORY OF CESAREAN SECTION, ANTEPARTUM: ICD-10-CM

## 2024-12-05 LAB
SL AMB  POCT GLUCOSE, UA: NORMAL
SL AMB POCT URINE PROTEIN: NORMAL

## 2024-12-05 PROCEDURE — 90471 IMMUNIZATION ADMIN: CPT | Performed by: NURSE PRACTITIONER

## 2024-12-05 PROCEDURE — 82105 ALPHA-FETOPROTEIN SERUM: CPT

## 2024-12-05 PROCEDURE — PNV: Performed by: NURSE PRACTITIONER

## 2024-12-05 PROCEDURE — 81002 URINALYSIS NONAUTO W/O SCOPE: CPT | Performed by: NURSE PRACTITIONER

## 2024-12-05 PROCEDURE — 90656 IIV3 VACC NO PRSV 0.5 ML IM: CPT | Performed by: NURSE PRACTITIONER

## 2024-12-05 PROCEDURE — 36415 COLL VENOUS BLD VENIPUNCTURE: CPT

## 2024-12-05 NOTE — ASSESSMENT & PLAN NOTE
Flu vaccine today  L2US tomorrow  Hasn't done AFP yet, will do this week, discussed time sensitive

## 2024-12-05 NOTE — PROGRESS NOTES
"Routine Prenatal Visit  [facility] - [place]  [address] [place] [address]    Assessment/Plan:  Deyanira is a 34 y.o. year old  at 20w3d who presents for routine prenatal visit.     1. 20 weeks gestation of pregnancy  Assessment & Plan:  Flu vaccine today  L2US tomorrow  Hasn't done AFP yet, will do this week, discussed time sensitive  Orders:  -     POCT urine dip  2. 16 weeks gestation of pregnancy  Assessment & Plan:  Flu vaccine today  L2US tomorrow  Hasn't done AFP yet, will do this week, discussed time sensitive  3. History of gestational hypertension  4. Hx of delivery by vacuum extraction, currently pregnant  5. Pregnancy with history of  section, antepartum  6. Family history of Down syndrome      Next OB Visit 4 weeks.    Subjective:     CC: Prenatal care    Deyanira Gómez is a 34 y.o.  female who presents for routine prenatal care at 20w3d. Pt feels well, no complaints.   Pregnancy ROS: no leakage of fluid, pelvic pain, or vaginal bleeding.  normal fetal movement.    The following portions of the patient's history were reviewed and updated as appropriate: allergies, current medications, past family history, past medical history, obstetric history, gynecologic history, past social history, past surgical history and problem list.      Objective:  /68 (BP Location: Left arm, Patient Position: Sitting, Cuff Size: Standard)   Ht 5' 4\" (1.626 m)   Wt 81.6 kg (179 lb 12.8 oz)   LMP 07/15/2024   BMI 30.86 kg/m²   Pregravid Weight/BMI: 73.9 kg (163 lb) (BMI 27.97)  Current Weight: 81.6 kg (179 lb 12.8 oz)   Total Weight Gain: 7.62 kg (16 lb 12.8 oz)   Pre-Kathy Vitals      Flowsheet Row Most Recent Value   Prenatal Assessment    Fetal Heart Rate 155   Fundal Height (cm) 20 cm   Movement Present   Prenatal Vitals    Blood Pressure 118/68   Weight - Scale 81.6 kg (179 lb 12.8 oz)   Urine Albumin/Glucose    Dilation/Effacement/Station    Vaginal Drainage    Draining " Fluid No   Edema    LLE Edema None   RLE Edema Trace             General: Well appearing, no distress  Abdomen: Soft, gravid, nontender  Extremities: Non tender.

## 2024-12-06 ENCOUNTER — ROUTINE PRENATAL (OUTPATIENT)
Dept: PERINATAL CARE | Facility: OTHER | Age: 34
End: 2024-12-06
Payer: COMMERCIAL

## 2024-12-06 VITALS
HEIGHT: 64 IN | WEIGHT: 179.8 LBS | DIASTOLIC BLOOD PRESSURE: 62 MMHG | SYSTOLIC BLOOD PRESSURE: 110 MMHG | HEART RATE: 96 BPM | BODY MASS INDEX: 30.7 KG/M2

## 2024-12-06 DIAGNOSIS — O09.292 PREVIOUS CHILD WITH DOWN SYNDROME IN SECOND TRIMESTER, ANTEPARTUM: ICD-10-CM

## 2024-12-06 DIAGNOSIS — O35.2XX0 PREVIOUS CHILD BORN WITH VENTRICULAR SEPTAL DEFECT, CURRENTLY PREGNANT, SINGLE OR UNSPECIFIED FETUS: ICD-10-CM

## 2024-12-06 DIAGNOSIS — O35.2XX0 HEREDITARY FAMILIAL DISEASE AFFECTING MANAGEMENT OF MOTHER AND POSSIBLY AFFECTING FETUS, ANTEPARTUM, SINGLE OR UNSPECIFIED FETUS: ICD-10-CM

## 2024-12-06 DIAGNOSIS — Z36.86 ENCOUNTER FOR ANTENATAL SCREENING FOR CERVICAL LENGTH: ICD-10-CM

## 2024-12-06 DIAGNOSIS — Z36.3 ENCOUNTER FOR ANTENATAL SCREENING FOR MALFORMATIONS: ICD-10-CM

## 2024-12-06 DIAGNOSIS — Z82.79 FAMILY HISTORY OF DOWN SYNDROME: ICD-10-CM

## 2024-12-06 DIAGNOSIS — Z3A.20 20 WEEKS GESTATION OF PREGNANCY: Primary | ICD-10-CM

## 2024-12-06 PROCEDURE — 76817 TRANSVAGINAL US OBSTETRIC: CPT | Performed by: OBSTETRICS & GYNECOLOGY

## 2024-12-06 PROCEDURE — 76811 OB US DETAILED SNGL FETUS: CPT | Performed by: OBSTETRICS & GYNECOLOGY

## 2024-12-06 PROCEDURE — 99213 OFFICE O/P EST LOW 20 MIN: CPT | Performed by: OBSTETRICS & GYNECOLOGY

## 2024-12-06 NOTE — PROGRESS NOTES
Ultrasound Probe Disinfection    A transvaginal ultrasound was performed.   Prior to use, disinfection was performed with High Level Disinfection Process ([employer]).  Probe serial number [id number] was used.    Alicia Vega  12/06/24  2:22 PM

## 2024-12-06 NOTE — LETTER
December 6, 2024     Eriberto Cochran MD  [address] [facility]   [address]  [facility] [address]    Patient: Deyanira Gómez   YOB: 1990   Date of Visit: 12/6/2024       Dear Dr. Cochran:    Thank you for referring Deyanira Gómez to me for evaluation. Below are my notes for this consultation.    If you have questions, please do not hesitate to call me. I look forward to following your patient along with you.         Sincerely,        Ricardo Abraham MD        CC: No Recipients    Ricardo Abraham MD  12/6/2024  2:57 PM  Sign when Signing Visit  Please refer to the [facility] ultrasound report in Ob Procedures for additional information regarding today's visit

## 2024-12-07 LAB
2ND TRIMESTER 4 SCREEN SERPL-IMP: NORMAL
AFP ADJ MOM SERPL: 0.72
AFP INTERP AMN-IMP: NORMAL
AFP INTERP SERPL-IMP: NORMAL
AFP INTERP SERPL-IMP: NORMAL
AFP SERPL-MCNC: 39.4 NG/ML
AGE AT DELIVERY: 33.6 YR
GA METHOD: NORMAL
GA: 20.4 WEEKS
IDDM PATIENT QL: NO
MULTIPLE PREGNANCY: NO
NEURAL TUBE DEFECT RISK FETUS: NORMAL %

## 2024-12-09 ENCOUNTER — RESULTS FOLLOW-UP (OUTPATIENT)
Dept: OBGYN CLINIC | Facility: CLINIC | Age: 34
End: 2024-12-09

## 2024-12-20 ENCOUNTER — TELEPHONE (OUTPATIENT)
Dept: OBGYN CLINIC | Facility: CLINIC | Age: 34
End: 2024-12-20

## 2024-12-20 NOTE — TELEPHONE ENCOUNTER
Second Trimester call   Overall how are you doing?   Reports she is feeling good and appreciative of call.     Compliant with routine OB care appointments? yes    Have you completed your 1st trimester labs? yes    If you had NIPS with MFM, do you have a order for MSAFP?   Completed 12/05/24   Can be completed 15w-22w9d, ideally 16w-18w    Have you seen MFM and do you have your detailed US scheduled? 12/06/24    Pregnancy Education-have you had a chance to review the classes offered and registered?   Third pregnancy

## 2024-12-23 ENCOUNTER — ROUTINE PRENATAL (OUTPATIENT)
Dept: PERINATAL CARE | Facility: OTHER | Age: 34
End: 2024-12-23
Payer: COMMERCIAL

## 2024-12-23 VITALS
DIASTOLIC BLOOD PRESSURE: 62 MMHG | WEIGHT: 187.4 LBS | BODY MASS INDEX: 31.99 KG/M2 | HEIGHT: 64 IN | HEART RATE: 100 BPM | SYSTOLIC BLOOD PRESSURE: 108 MMHG

## 2024-12-23 DIAGNOSIS — O09.292 PRIOR PREGNANCY WITH CONGENITAL CARDIAC DEFECT IN SECOND TRIMESTER, ANTEPARTUM: ICD-10-CM

## 2024-12-23 DIAGNOSIS — Z3A.23 23 WEEKS GESTATION OF PREGNANCY: Primary | ICD-10-CM

## 2024-12-23 PROCEDURE — 76825 ECHO EXAM OF FETAL HEART: CPT | Performed by: OBSTETRICS & GYNECOLOGY

## 2024-12-23 PROCEDURE — 76827 ECHO EXAM OF FETAL HEART: CPT | Performed by: OBSTETRICS & GYNECOLOGY

## 2024-12-23 PROCEDURE — 93325 DOPPLER ECHO COLOR FLOW MAPG: CPT | Performed by: OBSTETRICS & GYNECOLOGY

## 2024-12-23 NOTE — PROGRESS NOTES
The patient was seen today for an ultrasound.  Please see ultrasound report (located under Ob Procedures) for additional details.   Thank you very much for allowing us to participate in the care of this very nice patient.  Should you have any questions, please do not hesitate to contact me.     Ananda Rosas MD FACOG  Attending Physician, Maternal-Fetal Medicine  [facility]

## 2024-12-31 ENCOUNTER — ROUTINE PRENATAL (OUTPATIENT)
Dept: OBGYN CLINIC | Facility: CLINIC | Age: 34
End: 2024-12-31
Payer: COMMERCIAL

## 2024-12-31 VITALS
BODY MASS INDEX: 31.58 KG/M2 | DIASTOLIC BLOOD PRESSURE: 68 MMHG | HEIGHT: 64 IN | WEIGHT: 185 LBS | SYSTOLIC BLOOD PRESSURE: 124 MMHG

## 2024-12-31 DIAGNOSIS — O09.292 PRIOR PREGNANCY WITH CONGENITAL CARDIAC DEFECT IN SECOND TRIMESTER, ANTEPARTUM: ICD-10-CM

## 2024-12-31 DIAGNOSIS — Z98.891 HISTORY OF VBAC: ICD-10-CM

## 2024-12-31 DIAGNOSIS — Z36.89 ENCOUNTER FOR OTHER SPECIFIED ANTENATAL SCREENING: ICD-10-CM

## 2024-12-31 DIAGNOSIS — O34.219 PREGNANCY WITH HISTORY OF CESAREAN SECTION, ANTEPARTUM: ICD-10-CM

## 2024-12-31 DIAGNOSIS — Z87.59 HISTORY OF GESTATIONAL HYPERTENSION: ICD-10-CM

## 2024-12-31 DIAGNOSIS — O09.299 HX OF DELIVERY BY VACUUM EXTRACTION, CURRENTLY PREGNANT: ICD-10-CM

## 2024-12-31 DIAGNOSIS — Z3A.20 20 WEEKS GESTATION OF PREGNANCY: ICD-10-CM

## 2024-12-31 DIAGNOSIS — Z82.79 FAMILY HISTORY OF DOWN SYNDROME: ICD-10-CM

## 2024-12-31 DIAGNOSIS — Z3A.24 24 WEEKS GESTATION OF PREGNANCY: Primary | ICD-10-CM

## 2024-12-31 LAB
DME PARACHUTE DELIVERY DATE REQUESTED: NORMAL
DME PARACHUTE ITEM DESCRIPTION: NORMAL
DME PARACHUTE ORDER STATUS: NORMAL
DME PARACHUTE SUPPLIER NAME: NORMAL
DME PARACHUTE SUPPLIER PHONE: NORMAL
SL AMB  POCT GLUCOSE, UA: NORMAL
SL AMB POCT URINE PROTEIN: NORMAL

## 2024-12-31 PROCEDURE — 81002 URINALYSIS NONAUTO W/O SCOPE: CPT | Performed by: NURSE PRACTITIONER

## 2024-12-31 PROCEDURE — PNV: Performed by: NURSE PRACTITIONER

## 2024-12-31 NOTE — PROGRESS NOTES
"Routine Prenatal Visit  [facility] - [place]  [address] [place] [address]    Assessment/Plan:  Deyanira is a 34 y.o. year old  at 24w1d who presents for routine prenatal visit.     1. 24 weeks gestation of pregnancy  Assessment & Plan:  Third trimester labs ordered, discussed timing  Orders:  -     POCT urine dip  2. Encounter for other specified  screening  -     Glucose, 1H PG; Future  -     CBC; Future  -     RPR-Syphilis Screening (Total Syphilis IGG/IGM); Future  -     Glucose, 1H PG; Future  -     CBC; Future  3. 20 weeks gestation of pregnancy  Assessment & Plan:  Third trimester labs ordered, discussed timing  4. History of gestational hypertension  Assessment & Plan:  One elevated BP with first pregnancy only  5. Hx of delivery by vacuum extraction, currently pregnant  6. 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 Family history of Down syndrome  Assessment & Plan:  Normal NIPT, anatomy, afp, fetal echo    8. Pregnancy with history of  section, antepartum  Assessment & Plan:  Wants   9. History of       Next OB Visit 2 weeks.    Subjective:     CC: Prenatal care    Deyanira Gómez is a 34 y.o.  female who presents for routine prenatal care at 24w1d. Feels well, no complaints.  Pregnancy ROS: no leakage of fluid, pelvic pain, or vaginal bleeding.  normal fetal movement.    The following portions of the patient's history were reviewed and updated as appropriate: allergies, current medications, past family history, past medical history, obstetric history, gynecologic history, past social history, past surgical history and problem list.      Objective:  /68 (BP Location: Left arm, Patient Position: Sitting, Cuff Size: Standard)   Ht 5' 4\" (1.626 m)   Wt 83.9 kg (185 lb)   LMP 07/15/2024   BMI 31.76 kg/m²   Pregravid Weight/BMI: 73.9 kg (163 lb) (BMI 27.97)  Current " Weight: 83.9 kg (185 lb)   Total Weight Gain: 9.979 kg (22 lb)   Pre-Kathy Vitals      Flowsheet Row Most Recent Value   Prenatal Assessment    Fetal Heart Rate 152   Movement Present   Prenatal Vitals    Blood Pressure 124/68   Weight - Scale 83.9 kg (185 lb)   Urine Albumin/Glucose    Dilation/Effacement/Station    Vaginal Drainage    Draining Fluid No   Edema    LLE Edema None   RLE Edema None   Facial Edema None             General: Well appearing, no distress  Abdomen: Soft, gravid, nontender  Extremities: Non tender.

## 2025-01-06 LAB
DME PARACHUTE DELIVERY DATE ACTUAL: NORMAL
DME PARACHUTE DELIVERY DATE REQUESTED: NORMAL
DME PARACHUTE ITEM DESCRIPTION: NORMAL
DME PARACHUTE ORDER STATUS: NORMAL
DME PARACHUTE SUPPLIER NAME: NORMAL
DME PARACHUTE SUPPLIER PHONE: NORMAL

## 2025-01-27 ENCOUNTER — APPOINTMENT (OUTPATIENT)
Dept: LAB | Facility: CLINIC | Age: 35
End: 2025-01-27
Payer: COMMERCIAL

## 2025-01-27 DIAGNOSIS — Z36.89 ENCOUNTER FOR OTHER SPECIFIED ANTENATAL SCREENING: ICD-10-CM

## 2025-01-27 LAB
ERYTHROCYTE [DISTWIDTH] IN BLOOD BY AUTOMATED COUNT: 14.2 % (ref 11.6–15.1)
GLUCOSE 1H P 50 G GLC PO SERPL-MCNC: 133 MG/DL (ref 70–134)
HCT VFR BLD AUTO: 38.3 % (ref 34.8–46.1)
HGB BLD-MCNC: 12.4 G/DL (ref 11.5–15.4)
MCH RBC QN AUTO: 29.2 PG (ref 26.8–34.3)
MCHC RBC AUTO-ENTMCNC: 32.4 G/DL (ref 31.4–37.4)
MCV RBC AUTO: 90 FL (ref 82–98)
PLATELET # BLD AUTO: 226 THOUSANDS/UL (ref 149–390)
PMV BLD AUTO: 10 FL (ref 8.9–12.7)
RBC # BLD AUTO: 4.25 MILLION/UL (ref 3.81–5.12)
WBC # BLD AUTO: 12.82 THOUSAND/UL (ref 4.31–10.16)

## 2025-01-27 PROCEDURE — 85027 COMPLETE CBC AUTOMATED: CPT

## 2025-01-27 PROCEDURE — 82950 GLUCOSE TEST: CPT

## 2025-01-27 PROCEDURE — 36415 COLL VENOUS BLD VENIPUNCTURE: CPT

## 2025-01-27 PROCEDURE — 86780 TREPONEMA PALLIDUM: CPT

## 2025-01-28 ENCOUNTER — RESULTS FOLLOW-UP (OUTPATIENT)
Dept: OBGYN CLINIC | Facility: CLINIC | Age: 35
End: 2025-01-28

## 2025-01-28 LAB — TREPONEMA PALLIDUM IGG+IGM AB [PRESENCE] IN SERUM OR PLASMA BY IMMUNOASSAY: NORMAL

## 2025-01-30 ENCOUNTER — ROUTINE PRENATAL (OUTPATIENT)
Dept: OBGYN CLINIC | Facility: CLINIC | Age: 35
End: 2025-01-30
Payer: COMMERCIAL

## 2025-01-30 VITALS
BODY MASS INDEX: 32.61 KG/M2 | WEIGHT: 191 LBS | HEIGHT: 64 IN | DIASTOLIC BLOOD PRESSURE: 70 MMHG | SYSTOLIC BLOOD PRESSURE: 120 MMHG

## 2025-01-30 DIAGNOSIS — O34.219 PREGNANCY WITH HISTORY OF CESAREAN SECTION, ANTEPARTUM: ICD-10-CM

## 2025-01-30 DIAGNOSIS — Z98.891 HISTORY OF VBAC: ICD-10-CM

## 2025-01-30 DIAGNOSIS — Z3A.28 28 WEEKS GESTATION OF PREGNANCY: ICD-10-CM

## 2025-01-30 DIAGNOSIS — O09.299 HX OF DELIVERY BY VACUUM EXTRACTION, CURRENTLY PREGNANT: Primary | ICD-10-CM

## 2025-01-30 DIAGNOSIS — O09.292 PRIOR PREGNANCY WITH CONGENITAL CARDIAC DEFECT IN SECOND TRIMESTER, ANTEPARTUM: ICD-10-CM

## 2025-01-30 DIAGNOSIS — Z23 NEED FOR TDAP VACCINATION: ICD-10-CM

## 2025-01-30 LAB
SL AMB  POCT GLUCOSE, UA: NORMAL
SL AMB POCT URINE PROTEIN: NORMAL

## 2025-01-30 PROCEDURE — 81002 URINALYSIS NONAUTO W/O SCOPE: CPT | Performed by: OBSTETRICS & GYNECOLOGY

## 2025-01-30 PROCEDURE — 90471 IMMUNIZATION ADMIN: CPT | Performed by: OBSTETRICS & GYNECOLOGY

## 2025-01-30 PROCEDURE — PNV: Performed by: OBSTETRICS & GYNECOLOGY

## 2025-01-30 PROCEDURE — 90715 TDAP VACCINE 7 YRS/> IM: CPT | Performed by: OBSTETRICS & GYNECOLOGY

## 2025-01-30 NOTE — PROGRESS NOTES
"Routine Prenatal Visit  [facility] - [place]  [address] [place] [address]    Assessment/Plan:  Deyanira is a 34 y.o. year old  at 28w3d who presents for routine prenatal visit.     1. Hx of delivery by vacuum extraction, currently pregnant  2. 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 28 weeks gestation of pregnancy  -     POCT urine dip  4. Need for Tdap vaccination  -     TDAP VACCINE GREATER THAN OR EQUAL TO 8YO IM  5. History of   Assessment & Plan:  Desires   6. Pregnancy with history of  section, antepartum        Subjective:   Deyanira Gómez is a 34 y.o.  who presents for routine prenatal care at 28w3d.  Complaints today: Denies  LOF: -; VB: -; Contractions: -; FM: +    Objective:  /70 (BP Location: Left arm, Patient Position: Sitting, Cuff Size: Standard)   Ht 5' 4\" (1.626 m)   Wt 86.6 kg (191 lb)   LMP 07/15/2024   BMI 32.79 kg/m²     General: Well appearing, no distress  Respiratory: Unlabored breathing  Abdomen: Soft, gravid, nontender  Extremities: Warm and well perfused.  Non tender.    Pregravid Weight/BMI: 73.9 kg (163 lb) (BMI 27.97)  Current Weight: 86.6 kg (191 lb)   Total Weight Gain: 12.7 kg (28 lb)     Pre-Kathy Vitals      Flowsheet Row Most Recent Value   Prenatal Assessment    Fetal Heart Rate 137   Movement Present   Prenatal Vitals    Blood Pressure 120/70   Weight - Scale 86.6 kg (191 lb)   Urine Albumin/Glucose    Dilation/Effacement/Station    Vaginal Drainage    Edema              Shil V Travon,   2025 7:56 AM     "

## 2025-02-11 ENCOUNTER — ROUTINE PRENATAL (OUTPATIENT)
Dept: OBGYN CLINIC | Facility: CLINIC | Age: 35
End: 2025-02-11
Payer: COMMERCIAL

## 2025-02-11 VITALS
DIASTOLIC BLOOD PRESSURE: 62 MMHG | BODY MASS INDEX: 32.61 KG/M2 | HEIGHT: 64 IN | SYSTOLIC BLOOD PRESSURE: 100 MMHG | WEIGHT: 191 LBS

## 2025-02-11 DIAGNOSIS — O09.299 HX OF DELIVERY BY VACUUM EXTRACTION, CURRENTLY PREGNANT: ICD-10-CM

## 2025-02-11 DIAGNOSIS — Z98.891 HISTORY OF VBAC: ICD-10-CM

## 2025-02-11 DIAGNOSIS — Z3A.30 30 WEEKS GESTATION OF PREGNANCY: ICD-10-CM

## 2025-02-11 DIAGNOSIS — O09.293 PRIOR PREGNANCY WITH CONGENITAL CARDIAC DEFECT IN THIRD TRIMESTER, ANTEPARTUM: ICD-10-CM

## 2025-02-11 DIAGNOSIS — Z87.59 HISTORY OF GESTATIONAL HYPERTENSION: Primary | ICD-10-CM

## 2025-02-11 LAB
SL AMB  POCT GLUCOSE, UA: NORMAL
SL AMB POCT URINE PROTEIN: NORMAL

## 2025-02-11 PROCEDURE — 81002 URINALYSIS NONAUTO W/O SCOPE: CPT | Performed by: OBSTETRICS & GYNECOLOGY

## 2025-02-11 PROCEDURE — PNV: Performed by: OBSTETRICS & GYNECOLOGY

## 2025-02-11 NOTE — PROGRESS NOTES
"Routine Prenatal Visit  [facility] - [place]  [address], [place] [address]    Assessment/Plan:  Deyanira is a 34 y.o. year old  at 30w1d who presents for routine prenatal visit.     1. History of gestational hypertension  2. Hx of delivery by vacuum extraction, currently pregnant  3. Prior pregnancy with congenital cardiac defect in third trimester, antepartum  4. 30 weeks gestation of pregnancy  -     POCT urine dip  5. History of   Assessment & Plan:  Good  candidate        Subjective:   Deyanira Gómez is a 34 y.o.  who presents for routine prenatal care at 30w1d.  Complaints today: Denies  LOF: -; VB: -; Contractions: -; FM: +    Objective:  /62 (BP Location: Left arm, Patient Position: Sitting, Cuff Size: Standard)   Ht 5' 4\" (1.626 m)   Wt 86.6 kg (191 lb)   LMP 07/15/2024   BMI 32.79 kg/m²     General: Well appearing, no distress  Respiratory: Unlabored breathing  Abdomen: Soft, gravid, nontender  Extremities: Warm and well perfused.  Non tender.    Pregravid Weight/BMI: 73.9 kg (163 lb) (BMI 27.97)  Current Weight: 86.6 kg (191 lb)   Total Weight Gain: 12.7 kg (28 lb)     Pre-Kathy Vitals      Flowsheet Row Most Recent Value   Prenatal Assessment    Fetal Heart Rate 150   Fundal Height (cm) 30 cm   Movement Present   Prenatal Vitals    Blood Pressure 100/62   Weight - Scale 86.6 kg (191 lb)   Urine Albumin/Glucose    Dilation/Effacement/Station    Vaginal Drainage    Edema              Yumiko Cool DO  2025 8:35 AM     "

## 2025-02-14 ENCOUNTER — TELEPHONE (OUTPATIENT)
Dept: OBGYN CLINIC | Facility: CLINIC | Age: 35
End: 2025-02-14

## 2025-02-14 NOTE — TELEPHONE ENCOUNTER
"Third Trimester call -    Overall how are you feeling?   Reports she is \"doing good, baby is active.\"  Has no questions at this time.     Compliant with routine OB appointments? yes    Have you completed your 3rd trimester lab work? yes    Have you reviewed the contents of 3rd trimester folder from office?    Have you decided on a pediatrician? yes    If yes, who [facility]    If no, reviewed practices and transferred call to [phone] to set up appointment with pediatric office.   Questions on paperwork to go back to office? no   Questions on the baby birth certificate and photography forms? nono      Sent link for the Hospital Readiness Video via [employer]  "

## 2025-02-22 PROBLEM — Z3A.32 32 WEEKS GESTATION OF PREGNANCY: Status: ACTIVE | Noted: 2024-11-05

## 2025-02-22 NOTE — PROGRESS NOTES
"Routine Prenatal Visit  [facility] - [place]  [address], [place] [address]  Assessment & Plan  Pregnancy with history of  section, antepartum  - Plans TOLAC. Good candidate, given prior successful .        Prior pregnancy with congenital cardiac defect in third trimester, antepartum  - Normal fetal echo this pregnancy.        32 weeks gestation of pregnancy  - PTL/PPROM/Bleeding precautions given. Kick counts reviewed.  - 3rd trimester growth US scheduled with M.   - Third trimester labs reviewed.   - TDaP previously administered .  - Problem list updated, results console reviewed and updated with pertinent prenatal labs.  - PMH, PSH, medications reviewed and updated as needed  - Return to office in 2 wk(s) for routine prenatal care    Orders:  •  POCT urine dip    Subjective:   Deyanira Gómez is a 34 y.o.  who presents for routine prenatal care at 31w5d.  Complaints today: None  LOF: No; VB: No; Contractions: No; FM: Present and normal    Objective:  /66 (BP Location: Left arm, Patient Position: Sitting, Cuff Size: Standard)   Ht 5' 4\" (1.626 m)   Wt 90.3 kg (199 lb)   LMP 07/15/2024   BMI 34.16 kg/m²     General: Well appearing, no distress  Respiratory: Unlabored breathing  Cardiovascular: Regular rate.  Abdomen: Soft, gravid, nontender  Extremities: Warm and well perfused.  Non tender.    Pregravid Weight/BMI: 73.9 kg (163 lb) (BMI 27.97)  Current Weight: 90.3 kg (199 lb)   Total Weight Gain: 16.3 kg (36 lb)     Pre- Vitals    Flowsheet Row Most Recent Value   Prenatal Assessment    Fetal Heart Rate 130   Fundal Height (cm) 32 cm   Movement Present   Prenatal Vitals    Blood Pressure 122/66   Weight - Scale 90.3 kg (199 lb)   Urine Albumin/Glucose    Dilation/Effacement/Station    Vaginal Drainage    Draining Fluid No   Edema    LLE Edema None   RLE Edema None   Facial Edema None           Adebayo Pretty MD  2025 8:18 AM   "

## 2025-02-22 NOTE — ASSESSMENT & PLAN NOTE
- PTL/PPROM/Bleeding precautions given. Kick counts reviewed.  - 3rd trimester growth US scheduled with [facility].   - Third trimester labs reviewed.   - TDaP previously administered 1/30.  - Problem list updated, results console reviewed and updated with pertinent prenatal labs.  - PMH, PSH, medications reviewed and updated as needed  - Return to office in 2 wk(s) for routine prenatal care    Orders:  •  POCT urine dip

## 2025-02-24 ENCOUNTER — ROUTINE PRENATAL (OUTPATIENT)
Dept: OBGYN CLINIC | Facility: CLINIC | Age: 35
End: 2025-02-24
Payer: COMMERCIAL

## 2025-02-24 VITALS
WEIGHT: 199 LBS | BODY MASS INDEX: 33.97 KG/M2 | DIASTOLIC BLOOD PRESSURE: 66 MMHG | HEIGHT: 64 IN | SYSTOLIC BLOOD PRESSURE: 122 MMHG

## 2025-02-24 DIAGNOSIS — O09.299 HX OF DELIVERY BY VACUUM EXTRACTION, CURRENTLY PREGNANT: ICD-10-CM

## 2025-02-24 DIAGNOSIS — Z3A.32 32 WEEKS GESTATION OF PREGNANCY: ICD-10-CM

## 2025-02-24 DIAGNOSIS — O34.219 PREGNANCY WITH HISTORY OF CESAREAN SECTION, ANTEPARTUM: Primary | ICD-10-CM

## 2025-02-24 DIAGNOSIS — Z87.59 HISTORY OF GESTATIONAL HYPERTENSION: ICD-10-CM

## 2025-02-24 DIAGNOSIS — O09.293 PRIOR PREGNANCY WITH CONGENITAL CARDIAC DEFECT IN THIRD TRIMESTER, ANTEPARTUM: ICD-10-CM

## 2025-02-24 DIAGNOSIS — Z98.891 HISTORY OF VBAC: ICD-10-CM

## 2025-02-24 LAB
SL AMB  POCT GLUCOSE, UA: NORMAL
SL AMB POCT URINE PROTEIN: NORMAL

## 2025-02-24 PROCEDURE — PNV: Performed by: STUDENT IN AN ORGANIZED HEALTH CARE EDUCATION/TRAINING PROGRAM

## 2025-02-24 PROCEDURE — 81002 URINALYSIS NONAUTO W/O SCOPE: CPT | Performed by: STUDENT IN AN ORGANIZED HEALTH CARE EDUCATION/TRAINING PROGRAM

## 2025-02-28 ENCOUNTER — ULTRASOUND (OUTPATIENT)
Dept: PERINATAL CARE | Facility: OTHER | Age: 35
End: 2025-02-28
Payer: COMMERCIAL

## 2025-02-28 VITALS
BODY MASS INDEX: 33.67 KG/M2 | SYSTOLIC BLOOD PRESSURE: 132 MMHG | DIASTOLIC BLOOD PRESSURE: 62 MMHG | HEIGHT: 64 IN | WEIGHT: 197.2 LBS | HEART RATE: 103 BPM

## 2025-02-28 DIAGNOSIS — Z87.59 HISTORY OF GESTATIONAL HYPERTENSION: ICD-10-CM

## 2025-02-28 DIAGNOSIS — Z3A.32 32 WEEKS GESTATION OF PREGNANCY: Primary | ICD-10-CM

## 2025-02-28 DIAGNOSIS — Z82.79 FAMILY HISTORY OF DOWN SYNDROME: ICD-10-CM

## 2025-02-28 DIAGNOSIS — Z36.89 ENCOUNTER FOR ULTRASOUND TO CHECK FETAL GROWTH: ICD-10-CM

## 2025-02-28 PROCEDURE — 76816 OB US FOLLOW-UP PER FETUS: CPT | Performed by: STUDENT IN AN ORGANIZED HEALTH CARE EDUCATION/TRAINING PROGRAM

## 2025-02-28 NOTE — PROGRESS NOTES
This patient received  care under my supervision on 25 at 32w4d gestational age at [facility].  The note is contained in the ultrasound report located under OB Procedures tab in Epic.  Please call our office at [phone] with questions.  -Adilia Ruiz MD

## 2025-03-12 ENCOUNTER — ROUTINE PRENATAL (OUTPATIENT)
Dept: OBGYN CLINIC | Facility: CLINIC | Age: 35
End: 2025-03-12
Payer: COMMERCIAL

## 2025-03-12 VITALS
WEIGHT: 196 LBS | DIASTOLIC BLOOD PRESSURE: 74 MMHG | BODY MASS INDEX: 33.46 KG/M2 | SYSTOLIC BLOOD PRESSURE: 126 MMHG | HEIGHT: 64 IN

## 2025-03-12 DIAGNOSIS — Z3A.34 34 WEEKS GESTATION OF PREGNANCY: ICD-10-CM

## 2025-03-12 DIAGNOSIS — Z98.891 HISTORY OF VBAC: ICD-10-CM

## 2025-03-12 DIAGNOSIS — O34.219 PREGNANCY WITH HISTORY OF CESAREAN SECTION, ANTEPARTUM: Primary | ICD-10-CM

## 2025-03-12 DIAGNOSIS — O09.293 PRIOR PREGNANCY WITH CONGENITAL CARDIAC DEFECT IN THIRD TRIMESTER, ANTEPARTUM: ICD-10-CM

## 2025-03-12 LAB
SL AMB  POCT GLUCOSE, UA: NORMAL
SL AMB POCT URINE PROTEIN: NORMAL

## 2025-03-12 PROCEDURE — 81002 URINALYSIS NONAUTO W/O SCOPE: CPT | Performed by: OBSTETRICS & GYNECOLOGY

## 2025-03-12 PROCEDURE — PNV: Performed by: OBSTETRICS & GYNECOLOGY

## 2025-03-12 NOTE — PROGRESS NOTES
"Routine Prenatal Visit  [facility] - [address], [address], [place] [address]    Assessment/Plan:  Deyanira is a 34 y.o. year old  at 34w2d who presents for routine prenatal visit.     1. Pregnancy with history of  section, antepartum  2. History of   3. Prior pregnancy with congenital cardiac defect in third trimester, antepartum  4. 34 weeks gestation of pregnancy  -     POCT urine dip        Subjective:     CC: Prenatal care    Deyanira Gómez is a 34 y.o.  female who presents for routine prenatal care at 34w2d.  Pregnancy ROS: no leakage of fluid, pelvic pain, or vaginal bleeding.  normal fetal movement.    The following portions of the patient's history were reviewed and updated as appropriate: allergies, current medications, past family history, past medical history, obstetric history, gynecologic history, past social history, past surgical history and problem list.      Objective:  /74 (BP Location: Left arm, Patient Position: Sitting, Cuff Size: Standard)   Ht 5' 4\" (1.626 m)   Wt 88.9 kg (196 lb)   LMP 07/15/2024   BMI 33.64 kg/m²   Pregravid Weight/BMI: 73.9 kg (163 lb) (BMI 27.97)  Current Weight: 88.9 kg (196 lb)   Total Weight Gain: 15 kg (33 lb)   Pre-Kathy Vitals      Flowsheet Row Most Recent Value   Prenatal Assessment    Fetal Heart Rate 140   Fundal Height (cm) 34 cm   Movement Present   Presentation Vertex   Prenatal Vitals    Blood Pressure 126/74   Weight - Scale 88.9 kg (196 lb)   Urine Albumin/Glucose    Dilation/Effacement/Station    Vaginal Drainage    Edema              General: Well appearing, no distress  Respiratory: Unlabored breathing  Cardiovascular: Regular rate.  Abdomen: Soft, gravid, nontender  Fundal Height: Appropriate for gestational age.  Extremities: Warm and well perfused.  Non tender.  "

## 2025-03-13 ENCOUNTER — TELEPHONE (OUTPATIENT)
Age: 35
End: 2025-03-13

## 2025-03-13 NOTE — TELEPHONE ENCOUNTER
34w3d OB called- her child has pinworm and the pediatrician suggests treating the whole family. Patient confirms, she does not have symptoms.     Encouraged strict hygiene practices- frequent handwashing, bathing and changing bedding.

## 2025-03-14 NOTE — TELEPHONE ENCOUNTER
"Patient stated, \"I'm calling to follow up on the questions I had for the provider. I called this morning and have not heard back.\"        Relayed to patient the provider's message. Patient had no questions and did not require after hours nurse service.  "

## 2025-03-14 NOTE — TELEPHONE ENCOUNTER
Patient calling to follow up with call from yesterday as she has not received a response. Message sent to on call Dr. Silva for review.

## 2025-03-27 ENCOUNTER — ROUTINE PRENATAL (OUTPATIENT)
Dept: OBGYN CLINIC | Facility: CLINIC | Age: 35
End: 2025-03-27
Payer: COMMERCIAL

## 2025-03-27 VITALS
SYSTOLIC BLOOD PRESSURE: 118 MMHG | WEIGHT: 197 LBS | HEIGHT: 64 IN | DIASTOLIC BLOOD PRESSURE: 66 MMHG | BODY MASS INDEX: 33.63 KG/M2

## 2025-03-27 DIAGNOSIS — O09.293 PRIOR PREGNANCY WITH CONGENITAL CARDIAC DEFECT IN THIRD TRIMESTER, ANTEPARTUM: ICD-10-CM

## 2025-03-27 DIAGNOSIS — O09.299 HX OF DELIVERY BY VACUUM EXTRACTION, CURRENTLY PREGNANT: ICD-10-CM

## 2025-03-27 DIAGNOSIS — Z3A.36 36 WEEKS GESTATION OF PREGNANCY: Primary | ICD-10-CM

## 2025-03-27 DIAGNOSIS — Z87.59 HISTORY OF GESTATIONAL HYPERTENSION: ICD-10-CM

## 2025-03-27 DIAGNOSIS — Z98.891 HISTORY OF VBAC: ICD-10-CM

## 2025-03-27 DIAGNOSIS — Z36.85 ANTENATAL SCREENING FOR STREPTOCOCCUS B: ICD-10-CM

## 2025-03-27 DIAGNOSIS — O34.219 PREGNANCY WITH HISTORY OF CESAREAN SECTION, ANTEPARTUM: ICD-10-CM

## 2025-03-27 LAB
SL AMB  POCT GLUCOSE, UA: NORMAL
SL AMB POCT URINE PROTEIN: NORMAL

## 2025-03-27 PROCEDURE — 81002 URINALYSIS NONAUTO W/O SCOPE: CPT | Performed by: OBSTETRICS & GYNECOLOGY

## 2025-03-27 PROCEDURE — PNV: Performed by: OBSTETRICS & GYNECOLOGY

## 2025-03-27 PROCEDURE — 87150 DNA/RNA AMPLIFIED PROBE: CPT | Performed by: OBSTETRICS & GYNECOLOGY

## 2025-03-27 NOTE — PROGRESS NOTES
"Routine Prenatal Visit  [facility] [address], [address], [place] [address]    Assessment/Plan:  Deyanira is a 34 y.o. year old  at 36w3d who presents for routine prenatal visit.     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    Subjective:     CC: Prenatal care    Deyanira Gómez is a 34 y.o.  female who presents for routine prenatal care at 36w3d.  Pregnancy ROS: no leakage of fluid, pelvic pain, or vaginal bleeding.  normal fetal movement.    The following portions of the patient's history were reviewed and updated as appropriate: allergies, current medications, past family history, past medical history, obstetric history, gynecologic history, past social history, past surgical history and problem list.      Objective:  /66 (BP Location: Left arm, Patient Position: Sitting, Cuff Size: Standard)   Ht 5' 4\" (1.626 m)   Wt 89.4 kg (197 lb)   LMP 07/15/2024   BMI 33.81 kg/m²   Pregravid Weight/BMI: 73.9 kg (163 lb) (BMI 27.97)  Current Weight: 89.4 kg (197 lb)   Total Weight Gain: 15.4 kg (34 lb)   Pre- Vitals      Flowsheet Row Most Recent Value   Prenatal Assessment    Fetal Heart Rate 140   Fundal Height (cm) 36 cm   Movement Present   Prenatal Vitals    Blood Pressure 118/66   Weight - Scale 89.4 kg (197 lb)   Urine Albumin/Glucose    Dilation/Effacement/Station    Cervical Dilation 2   Cervical Effacement 50   Fetal Station -2   Vaginal Drainage    Draining Fluid No   Edema              General: Well appearing, no distress  Abdomen: " Soft, gravid, nontender  Extremities: Non tender.

## 2025-03-29 LAB — GP B STREP DNA SPEC QL NAA+PROBE: POSITIVE

## 2025-04-03 ENCOUNTER — ROUTINE PRENATAL (OUTPATIENT)
Dept: OBGYN CLINIC | Facility: CLINIC | Age: 35
End: 2025-04-03
Payer: COMMERCIAL

## 2025-04-03 VITALS
HEIGHT: 64 IN | SYSTOLIC BLOOD PRESSURE: 106 MMHG | BODY MASS INDEX: 34.08 KG/M2 | DIASTOLIC BLOOD PRESSURE: 64 MMHG | WEIGHT: 199.6 LBS

## 2025-04-03 DIAGNOSIS — Z98.891 HISTORY OF VBAC: ICD-10-CM

## 2025-04-03 DIAGNOSIS — O34.219 PREGNANCY WITH HISTORY OF CESAREAN SECTION, ANTEPARTUM: ICD-10-CM

## 2025-04-03 DIAGNOSIS — Z3A.37 37 WEEKS GESTATION OF PREGNANCY: Primary | ICD-10-CM

## 2025-04-03 DIAGNOSIS — O09.299 HX OF DELIVERY BY VACUUM EXTRACTION, CURRENTLY PREGNANT: ICD-10-CM

## 2025-04-03 DIAGNOSIS — O09.293 PRIOR PREGNANCY WITH CONGENITAL CARDIAC DEFECT IN THIRD TRIMESTER, ANTEPARTUM: ICD-10-CM

## 2025-04-03 DIAGNOSIS — O99.820 GBS (GROUP B STREPTOCOCCUS CARRIER), +RV CULTURE, CURRENTLY PREGNANT: ICD-10-CM

## 2025-04-03 DIAGNOSIS — Z87.59 HISTORY OF GESTATIONAL HYPERTENSION: ICD-10-CM

## 2025-04-03 LAB
SL AMB  POCT GLUCOSE, UA: NORMAL
SL AMB POCT URINE PROTEIN: NORMAL

## 2025-04-03 PROCEDURE — 81002 URINALYSIS NONAUTO W/O SCOPE: CPT | Performed by: NURSE PRACTITIONER

## 2025-04-03 PROCEDURE — PNV: Performed by: NURSE PRACTITIONER

## 2025-04-03 NOTE — PATIENT INSTRUCTIONS
Please call the office if you are having regular, painful contractions every 5 minutes for one hour, or if you are having any leaking, bleeding, or are concerned about baby's movements   Also call if you are having headaches, visual changes, right upper quadrant pain, which can be symptoms of preeclampsia.

## 2025-04-03 NOTE — PROGRESS NOTES
"Routine Prenatal Visit  [facility] [place]  [address], [place] [address]    Assessment/Plan:  Deyanira is a 34 y.o. year old  at 37w3d who presents for routine prenatal visit.     1. 37 weeks gestation of pregnancy  Assessment & Plan:  Reviewed positive GBS, antibiotics in labor  [place] [facility] [place]  Reports 4 hour labor last pregnancy, advised to call early and/or if SROM  Return visit 1 week    Orders:  -     POCT urine dip  2. Pregnancy with history of  section, antepartum  3. History of   4. History of gestational hypertension  Assessment & Plan:  Normal BP today  5. Hx of delivery by vacuum extraction, currently pregnant  6. 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 GBS (group B Streptococcus carrier), +RV culture, currently pregnant  Assessment & Plan:  Treat in labor      Next OB Visit 1 weeks.    Subjective:     CC: Prenatal care    Deyanira Gómez is a 34 y.o.  female who presents for routine prenatal care at 37w3d. Pt feels well, no complaints.  Pregnancy ROS: no leakage of fluid, pelvic pain, or vaginal bleeding.  normal fetal movement.    The following portions of the patient's history were reviewed and updated as appropriate: allergies, current medications, past family history, past medical history, obstetric history, gynecologic history, past social history, past surgical history and problem list.      Objective:  /64 (BP Location: Left arm, Patient Position: Sitting, Cuff Size: Standard)   Ht 5' 4\" (1.626 m)   Wt 90.5 kg (199 lb 9.6 oz)   LMP 07/15/2024   BMI 34.26 kg/m²   Pregravid Weight/BMI: 73.9 kg (163 lb) (BMI 27.97)  Current Weight: 90.5 kg (199 lb 9.6 oz)   Total Weight Gain: 16.6 kg (36 lb 9.6 oz)   Pre- Vitals      Flowsheet Row Most Recent Value   Prenatal Assessment    Fetal Heart Rate 145   Fundal Height (cm) 38 cm   Movement Present   Presentation Vertex   Prenatal Vitals    Blood " Pressure 106/64   Weight - Scale 90.5 kg (199 lb 9.6 oz)   Urine Albumin/Glucose    Dilation/Effacement/Station    Vaginal Drainage    Draining Fluid No   Edema              General: Well appearing, no distress  Abdomen: Soft, gravid, nontender  Extremities: Non tender.

## 2025-04-03 NOTE — ASSESSMENT & PLAN NOTE
Reviewed positive GBS, antibiotics in labor  [place] [facility] [place]  Reports 4 hour labor last pregnancy, advised to call early and/or if SROM  Return visit 1 week

## 2025-04-07 ENCOUNTER — NURSE TRIAGE (OUTPATIENT)
Age: 35
End: 2025-04-07

## 2025-04-07 ENCOUNTER — HOSPITAL ENCOUNTER (INPATIENT)
Facility: HOSPITAL | Age: 35
LOS: 1 days | Discharge: HOME | DRG: 781 | End: 2025-04-08
Attending: OBSTETRICS & GYNECOLOGY | Admitting: OBSTETRICS & GYNECOLOGY
Payer: COMMERCIAL

## 2025-04-07 DIAGNOSIS — O47.9 UTERINE CONTRACTIONS: Primary | ICD-10-CM

## 2025-04-07 LAB
ABO GROUP BLD: NORMAL
ALBUMIN SERPL BCG-MCNC: 3.7 G/DL (ref 3.5–5)
ALP SERPL-CCNC: 114 U/L (ref 34–104)
ALT SERPL W P-5'-P-CCNC: 7 U/L (ref 7–52)
ANION GAP SERPL CALCULATED.3IONS-SCNC: 11 MMOL/L (ref 4–13)
AST SERPL W P-5'-P-CCNC: 13 U/L (ref 13–39)
BASOPHILS # BLD AUTO: 0.05 THOUSANDS/ÂΜL (ref 0–0.1)
BASOPHILS NFR BLD AUTO: 0 % (ref 0–1)
BILIRUB SERPL-MCNC: 0.53 MG/DL (ref 0.2–1)
BLD GP AB SCN SERPL QL: NEGATIVE
BUN SERPL-MCNC: 4 MG/DL (ref 5–25)
CALCIUM SERPL-MCNC: 9.1 MG/DL (ref 8.4–10.2)
CHLORIDE SERPL-SCNC: 103 MMOL/L (ref 96–108)
CO2 SERPL-SCNC: 23 MMOL/L (ref 21–32)
CREAT SERPL-MCNC: 0.44 MG/DL (ref 0.6–1.3)
EOSINOPHIL # BLD AUTO: 0.16 THOUSAND/ÂΜL (ref 0–0.61)
EOSINOPHIL NFR BLD AUTO: 1 % (ref 0–6)
ERYTHROCYTE [DISTWIDTH] IN BLOOD BY AUTOMATED COUNT: 14.5 % (ref 11.6–15.1)
GFR SERPL CREATININE-BSD FRML MDRD: 133 ML/MIN/1.73SQ M
GLUCOSE SERPL-MCNC: 75 MG/DL (ref 65–140)
HCT VFR BLD AUTO: 41.6 % (ref 34.8–46.1)
HGB BLD-MCNC: 14 G/DL (ref 11.5–15.4)
IMM GRANULOCYTES # BLD AUTO: 0.28 THOUSAND/UL (ref 0–0.2)
IMM GRANULOCYTES NFR BLD AUTO: 2 % (ref 0–2)
LYMPHOCYTES # BLD AUTO: 1.87 THOUSANDS/ÂΜL (ref 0.6–4.47)
LYMPHOCYTES NFR BLD AUTO: 16 % (ref 14–44)
MCH RBC QN AUTO: 29.5 PG (ref 26.8–34.3)
MCHC RBC AUTO-ENTMCNC: 33.7 G/DL (ref 31.4–37.4)
MCV RBC AUTO: 88 FL (ref 82–98)
MONOCYTES # BLD AUTO: 0.69 THOUSAND/ÂΜL (ref 0.17–1.22)
MONOCYTES NFR BLD AUTO: 6 % (ref 4–12)
NEUTROPHILS # BLD AUTO: 8.47 THOUSANDS/ÂΜL (ref 1.85–7.62)
NEUTS SEG NFR BLD AUTO: 75 % (ref 43–75)
NRBC BLD AUTO-RTO: 0 /100 WBCS
PLATELET # BLD AUTO: 187 THOUSANDS/UL (ref 149–390)
PMV BLD AUTO: 9.8 FL (ref 8.9–12.7)
POTASSIUM SERPL-SCNC: 3.6 MMOL/L (ref 3.5–5.3)
PROT SERPL-MCNC: 7.3 G/DL (ref 6.4–8.4)
RBC # BLD AUTO: 4.75 MILLION/UL (ref 3.81–5.12)
RH BLD: POSITIVE
SODIUM SERPL-SCNC: 137 MMOL/L (ref 135–147)
SPECIMEN EXPIRATION DATE: NORMAL
WBC # BLD AUTO: 11.52 THOUSAND/UL (ref 4.31–10.16)

## 2025-04-07 PROCEDURE — 86901 BLOOD TYPING SEROLOGIC RH(D): CPT | Performed by: OBSTETRICS & GYNECOLOGY

## 2025-04-07 PROCEDURE — 86900 BLOOD TYPING SEROLOGIC ABO: CPT | Performed by: OBSTETRICS & GYNECOLOGY

## 2025-04-07 PROCEDURE — 76815 OB US LIMITED FETUS(S): CPT | Performed by: OBSTETRICS & GYNECOLOGY

## 2025-04-07 PROCEDURE — G0463 HOSPITAL OUTPT CLINIC VISIT: HCPCS

## 2025-04-07 PROCEDURE — 99214 OFFICE O/P EST MOD 30 MIN: CPT

## 2025-04-07 PROCEDURE — NC001 PR NO CHARGE: Performed by: OBSTETRICS & GYNECOLOGY

## 2025-04-07 PROCEDURE — 86780 TREPONEMA PALLIDUM: CPT | Performed by: OBSTETRICS & GYNECOLOGY

## 2025-04-07 PROCEDURE — 86850 RBC ANTIBODY SCREEN: CPT | Performed by: OBSTETRICS & GYNECOLOGY

## 2025-04-07 PROCEDURE — 59025 FETAL NON-STRESS TEST: CPT | Performed by: OBSTETRICS & GYNECOLOGY

## 2025-04-07 PROCEDURE — 80053 COMPREHEN METABOLIC PANEL: CPT | Performed by: OBSTETRICS & GYNECOLOGY

## 2025-04-07 PROCEDURE — 85025 COMPLETE CBC W/AUTO DIFF WBC: CPT | Performed by: OBSTETRICS & GYNECOLOGY

## 2025-04-07 RX ORDER — ONDANSETRON 2 MG/ML
4 INJECTION INTRAMUSCULAR; INTRAVENOUS EVERY 6 HOURS PRN
Status: DISCONTINUED | OUTPATIENT
Start: 2025-04-07 | End: 2025-04-08 | Stop reason: HOSPADM

## 2025-04-07 RX ORDER — BUPIVACAINE HYDROCHLORIDE 2.5 MG/ML
30 INJECTION, SOLUTION EPIDURAL; INFILTRATION; INTRACAUDAL; PERINEURAL ONCE AS NEEDED
Status: DISCONTINUED | OUTPATIENT
Start: 2025-04-07 | End: 2025-04-08 | Stop reason: HOSPADM

## 2025-04-07 RX ORDER — BUTORPHANOL TARTRATE 1 MG/ML
1 INJECTION, SOLUTION INTRAMUSCULAR; INTRAVENOUS
Status: DISCONTINUED | OUTPATIENT
Start: 2025-04-07 | End: 2025-04-08 | Stop reason: HOSPADM

## 2025-04-07 RX ORDER — SODIUM CHLORIDE, SODIUM LACTATE, POTASSIUM CHLORIDE, CALCIUM CHLORIDE 600; 310; 30; 20 MG/100ML; MG/100ML; MG/100ML; MG/100ML
125 INJECTION, SOLUTION INTRAVENOUS CONTINUOUS
Status: DISCONTINUED | OUTPATIENT
Start: 2025-04-07 | End: 2025-04-08 | Stop reason: HOSPADM

## 2025-04-07 RX ADMIN — SODIUM CHLORIDE 2.5 MILLION UNITS: 9 INJECTION, SOLUTION INTRAVENOUS at 23:04

## 2025-04-07 RX ADMIN — SODIUM CHLORIDE 5 MILLION UNITS: 0.9 INJECTION, SOLUTION INTRAVENOUS at 18:54

## 2025-04-07 RX ADMIN — SODIUM CHLORIDE, SODIUM LACTATE, POTASSIUM CHLORIDE, AND CALCIUM CHLORIDE 125 ML/HR: .6; .31; .03; .02 INJECTION, SOLUTION INTRAVENOUS at 18:55

## 2025-04-07 RX ADMIN — SODIUM CHLORIDE, SODIUM LACTATE, POTASSIUM CHLORIDE, AND CALCIUM CHLORIDE 125 ML/HR: .6; .31; .03; .02 INJECTION, SOLUTION INTRAVENOUS at 21:39

## 2025-04-07 NOTE — TELEPHONE ENCOUNTER
"FOLLOW UP: ESC message to Dr. Rebolledo ( she agrees with patient going to LD)     REASON FOR CONVERSATION: Abdominal Pain Pregnant    SYMPTOMS: abdominal pain, vaginal pressure, shortness of breath    OTHER: This patient is 38 weeks c/o cramping lower abdominal pain that radiates to her lower back that started last night around 12am. She rates pain a 4. She states her abdomen is constantly hard, she is short of breath, has vaginal pressure and was nauseous. She denies vaginal bleeding, leaking fluid, decreased fetal movement, diarrhea, vomiting or any other symptoms to note at this time. Advised she go to [facility] LD now. She verbalized understanding.     DISPOSITION: Go to LD Now            Reason for Disposition   MODERATE-SEVERE abdominal pain (e.g., interferes with normal activities, awakens from sleep)    Answer Assessment - Initial Assessment Questions  1. LOCATION: \"Where does it hurt?\"       Lower abdomen   2. RADIATION: \"Does the pain shoot anywhere else?\" (e.g., chest, back)      Lower back   3. ONSET: \"When did the pain begin?\" (Minutes, hours or days ago)       Last night (12 am)    4. SUDDEN: \"Gradual or sudden onset?\"      Gradual   5. PATTERN: \"Does the pain come and go, or has it been constant since it started?\"       Cramping comes and goes   6. SEVERITY: \"How bad is the pain?\" \"What does it keep you from doing?\"  (e.g., Scale 1-10; mild, moderate, or severe)      4  7. RECURRENT SYMPTOM: \"Have you ever had this type of stomach pain before?\" If Yes, ask: \"When was the last time?\" and \"What happened that time?\"       Back pain while in labor   8. CAUSE: \"What do you think is causing the stomach pain?      Unknown   9. RELIEVING/AGGRAVATING FACTORS: \"What makes it better or worse?\" (e.g., antacids, bowel movement, movement)      No   10. FETAL MOVEMENT: \"Has the baby's movement decreased or changed significantly from normal?\"        No   11. OTHER SYMPTOMS: \"Do you have any other symptoms?\" (e.g., " "back pain, contractions, diarrhea, fever, headache, urination pain, vaginal bleeding, vaginal discharge, vomiting)        Nausea, vaginal pressure, shortness of breath  12. LEONCIO: \"What date are you expecting to deliver?\"        4/21/25    Protocols used: Pregnancy - Abdominal Pain Greater Than 20 Weeks EGA-Adult-[place]    "

## 2025-04-07 NOTE — PROCEDURES
Deyanira Gómez, a  at 38w0d with an LEONCIO of 2025, by Last Menstrual Period, was seen at [facility] for the following procedure(s): $Procedure Type: NST, TATA]    Nonstress Test  Reason for NST: Other (Comment)  Variability: Moderate  Decelerations: None  Accelerations: Yes  Baseline: 140 BPM  Uterine Irritability: No  Contractions: Irregular    4 Quadrant TATA  TATA Q1 (cm): 3.8 cm  TATA Q2 (cm): 2.5 cm  TATA Q3 (cm): 3.8 cm  TATA Q4 (cm): 0.8 cm  TATA TOTAL (cm): 10.9 cm              Interpretation  Overall Impression: Reassuring

## 2025-04-07 NOTE — H&P
OB H&P  Deyanira Gómez  1990  LD TRIAGE  TRIAGE 1*          Assessment/Plan:   at 38 weeks.   Crampy abdominal pain, tightness   ?? Small change in cervix since admission    [id number] fetal tracing   Recheck one hour  Hx c/section then    Desires TOLAC  Hx gestational HTN   BP here wnl  Hx prior fetus with heart defect and trisomy 21   Fetal echo wnl   Genetic screening wnl  GBS +   Will treat in labor      34 y.o. yo  at 38 weeks by us and lmp    Chief complaint:  Abdominal cramping going into back    HPI:  Pt presents with above  since midnight.        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    Varicella     childhood disease around 3 years old        Past Surgical History:  Past Surgical History:   Procedure Laterality Date     SECTION      [place]  DELIVERY ONLY N/A 2016    Procedure:  SECTION ();  Surgeon: Memo Chun MD;  Location: [facility] ;  Service: Obstetrics    WISDOM TOOTH EXTRACTION Bilateral        Social Hx:  Social History     Socioeconomic History    Marital status: /Civil Union     Spouse name: Not on file    Number of children: Not on file    Years of education: Not on file    Highest education level: Not on file   Occupational History    Occupation: Student   Tobacco Use     Smoking status: Never     Passive exposure: Never    Smokeless tobacco: Never   Vaping Use    Vaping status: Never Used   Substance and Sexual Activity    Alcohol use: Not Currently    Drug use: Never    Sexual activity: Yes     Partners: Male   Other Topics Concern    Not on file   Social History Narrative    Feels safe at home    Single (as per [employer])     Social Drivers of Health     Financial Resource Strain: Not on file   Food Insecurity: No Food Insecurity (9/22/2024)    Nursing - Inadequate Food Risk Classification     Worried About Running Out of Food in the Last Year: Never true     Ran Out of Food in the Last Year: Never true     Ran Out of Food in the Last Year: Not on file   Transportation Needs: No Transportation Needs (9/22/2024)    PRAPARE - Transportation     Lack of Transportation (Medical): No     Lack of Transportation (Non-Medical): No   Physical Activity: Not on file   Stress: Not on file   Social Connections: Not on file   Intimate Partner Violence: Not on file   Housing Stability: Low Risk  (9/22/2024)    Housing Stability Vital Sign     Unable to Pay for Housing in the Last Year: No     Number of Times Moved in the Last Year: 0     Homeless in the Last Year: No       Meds:  No current facility-administered medications on file prior to encounter.     Current Outpatient Medications on File Prior to Encounter   Medication Sig Dispense Refill    Prenatal Vit-Fe Fumarate-FA (Prenatal Vitamin) 27-0.8 MG TABS Take by mouth         Allergies:  Allergies   Allergen Reactions    Latex Hives           RoS/    Constitutional: Negative    CV: Negative    Pulm: Negative    GI: Negative    Urinary: Negative    Neuro: Negative    Musculoskeletal: Negative      Labs:    Recent Results (from the past 24 hours)   CBC and differential    Collection Time: 04/07/25  2:23 PM   Result Value Ref Range    WBC 11.52 (H) 4.31 - 10.16 Thousand/uL    RBC 4.75 3.81 - 5.12 Million/uL    Hemoglobin 14.0 11.5 - 15.4 g/dL     Hematocrit 41.6 34.8 - 46.1 %    MCV 88 82 - 98 fL    MCH 29.5 26.8 - 34.3 pg    MCHC 33.7 31.4 - 37.4 g/dL    RDW 14.5 11.6 - 15.1 %    MPV 9.8 8.9 - 12.7 fL    Platelets 187 149 - 390 Thousands/uL    nRBC 0 /100 WBCs    Segmented % 75 43 - 75 %    Immature Grans % 2 0 - 2 %    Lymphocytes % 16 14 - 44 %    Monocytes % 6 4 - 12 %    Eosinophils Relative 1 0 - 6 %    Basophils Relative 0 0 - 1 %    Absolute Neutrophils 8.47 (H) 1.85 - 7.62 Thousands/µL    Absolute Immature Grans 0.28 (H) 0.00 - 0.20 Thousand/uL    Absolute Lymphocytes 1.87 0.60 - 4.47 Thousands/µL    Absolute Monocytes 0.69 0.17 - 1.22 Thousand/µL    Eosinophils Absolute 0.16 0.00 - 0.61 Thousand/µL    Basophils Absolute 0.05 0.00 - 0.10 Thousands/µL   Comprehensive metabolic panel    Collection Time: 25  2:23 PM   Result Value Ref Range    Sodium 137 135 - 147 mmol/L    Potassium 3.6 3.5 - 5.3 mmol/L    Chloride 103 96 - 108 mmol/L    CO2 23 21 - 32 mmol/L    ANION GAP 11 4 - 13 mmol/L    BUN 4 (L) 5 - 25 mg/dL    Creatinine 0.44 (L) 0.60 - 1.30 mg/dL    Glucose 75 65 - 140 mg/dL    Calcium 9.1 8.4 - 10.2 mg/dL    AST 13 13 - 39 U/L    ALT 7 7 - 52 U/L    Alkaline Phosphatase 114 (H) 34 - 104 U/L    Total Protein 7.3 6.4 - 8.4 g/dL    Albumin 3.7 3.5 - 5.0 g/dL    Total Bilirubin 0.53 0.20 - 1.00 mg/dL    eGFR 133 ml/min/1.73sq m         Obstetric History:    G 3 P 2002    1 c/s for fetal intolerance  1  with VaVD     Labs:  Strep Grp B PCR   Date Value Ref Range Status   2025 Positive (A) Negative Final     Comment:         2018 Negative for Beta Hemolytic Strep Grp B by PCR  Final     Type & Screen:  ABO Grouping   Date Value Ref Range Status   2024 AB  Final   10/14/2017 AB  Final      Rh Factor   Date Value Ref Range Status   2024 Positive  Final   10/14/2017 RH(D) POSITIVE  Final      Antibody Screen   Date Value Ref Range Status   2016 Negative Negative Final       Specimen Expiration Date   Date  Value Ref Range Status   09/25/2024 [id number]  Final     HIV-1/2 AB-AG   Date Value Ref Range Status   04/22/2016 Non Reactive Non Reactive Final     Comment:     Performed at: [facility], [address] [place] [address], , [place], [place], [id number], [id number]  MD:  [address] [place] [phone]       HIV AG/AB, 4th Gen   Date Value Ref Range Status   02/09/2018 NON-REACTIVE NON-REACTIVE Final     Comment:     HIV-1 antigen and HIV-1/HIV-2 antibodies were not  detected. There is no laboratory evidence of HIV  infection.     PLEASE NOTE: This information has been disclosed to  you from records whose confidentiality may be  protected by state law.  If your state requires such  protection, then the state law prohibits you from  making any further disclosure of the information  without the specific written consent of the person  to whom it pertains, or as otherwise permitted by law.  A general authorization for the release of medical or  other information is NOT sufficient for this purpose.      For additional information please refer to  http://education.[employer]/faq/JSG018  (This link is being provided for informational/  educational purposes only.)        The performance of this assay has not been clinically  validated in patients less than 2 years old.          HEPATITIS B SURFACE ANTIGEN   Date Value Ref Range Status   10/14/2017 NON-REACTIVE NON-REACTIVE Final     Hepatitis B Surface Ag   Date Value Ref Range Status   09/25/2024 Non-reactive Non-Reactive Final     RPR   Date Value Ref Range Status   04/10/2018 Non-Reactive Non-Reactive Final   10/14/2017 NON-REACTIVE NON-REACTIVE Final     Rubella IgG Quant   Date Value Ref Range Status   09/25/2024 24.1 >14.9 IU/mL Final     Glucose   Date Value Ref Range Status   01/27/2025 133 70 - 134 mg/dL Final     Comment:     <=134 Normal   135-179 Impaired glucose fasting. Perform 3 Hour Glucose Tolerance   >=180 Diagnosis Gestational Diabetes Mellitus            Physical Exam:  Vital signs:    Vitals:    04/07/25 1438   BP:    Pulse:    Resp:    Temp:    SpO2: (!) 87%          Constitutional:       General: She is not in acute distress.     Appearance: Normal appearance.   HENT:      Head: Normocephalic.      Nose: Nose normal.   Eyes:      General: No scleral icterus.  Cardiovascular:      Pulses: Normal pulses.   Pulmonary:      Effort: Pulmonary effort is normal. No respiratory distress.      Breath sounds: No wheezing.   Abdominal:      General: Bowel sounds are normal. There is no distension.      Palpations: Abdomen is soft.Uterine size appropriate for gestational age     Tenderness: There is no abdominal tenderness. There is no guarding.   Musculoskeletal:      Cervical back: Neck supple.      Right lower leg: No edema.      Left lower leg: No edema.   Skin:     General: Skin is warm.      Capillary Refill: Capillary refill takes less than 2 seconds.   Neurological:      Mental Status: She is alert and oriented to person, place, and time.   Psychiatric:         Mood and Affect: Mood normal.      Cervix: 3-4 cm  long   FHR: cat 1  CTXN: irreg q 2-5 min

## 2025-04-07 NOTE — OB LABOR/OXYTOCIN SAFETY PROGRESS
Labor Progress Note - Deyanira Gómez 34 y.o. female MRN: [id number]    Unit/Bed#: LD TRIAGE 1-01 Encounter: [id number]       Contraction Frequency (minutes): 2-4  Contraction Intensity: Mild  Uterine Activity Characteristics: Regular  Cervical Dilation: 4-5        Cervical Effacement: 20  Fetal Station: -3  Baseline Rate (FHR): 145 bpm  Fetal Heart Rate (FHT): 157 BPM  FHR Category: 1               Vital Signs:   Vitals:    04/07/25 1645   BP: 118/57   Pulse: (!) 110   Resp:    Temp:    SpO2:        Notes/comments:   ]  Has continued to make slow change, but definite change  Will admit and begin Abx for GBS        Catherine Florian DO 4/7/2025 6:39 PM

## 2025-04-08 VITALS
TEMPERATURE: 98.4 F | HEART RATE: 80 BPM | SYSTOLIC BLOOD PRESSURE: 113 MMHG | DIASTOLIC BLOOD PRESSURE: 62 MMHG | OXYGEN SATURATION: 95 % | RESPIRATION RATE: 18 BRPM | BODY MASS INDEX: 34.26 KG/M2 | HEIGHT: 64 IN

## 2025-04-08 LAB — TREPONEMA PALLIDUM IGG+IGM AB [PRESENCE] IN SERUM OR PLASMA BY IMMUNOASSAY: NORMAL

## 2025-04-08 NOTE — NURSING NOTE
Patient discharged this morning. [employer] instructions reviewed, patient to view on [email or web address]. Pt with no complaints or further questions. IV removed and patient walked off unit with spouse in stable condition.

## 2025-04-08 NOTE — OB LABOR/OXYTOCIN SAFETY PROGRESS
Labor Progress Note - Deyanira Gómez 34 y.o. female MRN: [id number]    Unit/Bed#: -01 Encounter: [id number]       Contraction Frequency (minutes): 7-8  Contraction Intensity: Mild  Uterine Activity Characteristics: Irregular  Cervical Dilation: 4-5        Cervical Effacement: 20  Fetal Station: -3  Baseline Rate (FHR): 145 bpm  Fetal Heart Rate (FHT): 157 BPM  FHR Category: 1               Vital Signs: stable  Vitals:    04/07/25 1928   BP:    Pulse:    Resp: 16   Temp: 99 °F (37.2 °C)   SpO2:        Notes/comments:     SIUP @ 38.0wks, latent labor.   GBS pos:  For second dose of PCN @ 2300  Anterior cervical Nabothian cyst (2-3cm)  Discussed plan of care with pt and partner concerning augmentation of labor without any medical indications @ 38.0 wks.  Pt lives 10-15min away.  Will recheck once ctx become more frequent or more painful.   Patient verbalized understanding of today's discussion with all questions and concerns addressed to her satisfaction.        ROSS Rebolledo MD, FACOG   4/7/2025 9:44 PM

## 2025-04-08 NOTE — OB LABOR/OXYTOCIN SAFETY PROGRESS
Labor Progress Note - Deyanira Gómez 34 y.o. female MRN: [id number]    Unit/Bed#: -01 Encounter: [id number]       Contraction Frequency (minutes): 5-6  Contraction Intensity: Mild  Uterine Activity Characteristics: Irregular  Cervical Dilation: 4-5        Cervical Effacement: 20  Fetal Station: -3  Baseline Rate (FHR): 130 bpm  Fetal Heart Rate (FHT): 157 BPM  FHR Category: 1               Vital Signs:   Vitals:    04/07/25 2303   BP: 113/62   Pulse: 103   Resp: 16   Temp: 98.4 °F (36.9 °C)   SpO2: 95%       Notes/comments:     As per conversation with Dr. Rebolledo, pt has made no more changes, ok for discharge  Given labor precautions    Catherine Florian DO 4/8/2025 4:54 AM

## 2025-04-09 ENCOUNTER — NURSE TRIAGE (OUTPATIENT)
Age: 35
End: 2025-04-09

## 2025-04-09 NOTE — TELEPHONE ENCOUNTER
"FOLLOW UP: RN will discuss with provider on call, and place a call back to patient with recommendations.     ESC to provider on call, Dr. Cochran. Per provider, can come to LD now if very concerned for an US, NST and cervical exam with speculum. Or can continue monitoring and if symptoms worsen or she gets more worried - can come to LD later today. If symptoms remain the same or improve, can follow up at tomorrow's OB visit as well.     RN placed call to patient with update. Pt agreeable. Will continue to monitor and respond appropriately. RN advised if symptoms worsen, she notes consistent contractions, leaking of fluid or decreased fetal movement - go to LD. Advised if she has additional questions, can call office back anytime. Pt agreeable to plan. No further questions.     REASON FOR CONVERSATION: Vaginal Bleeding - Pregnant    SYMPTOMS: Bright red light bleeding/passing clots when urinating; On pad about a quarter size.    OTHER: Pt is 38w2d, , was seen in LD on  and had multiple cervical checks while there. Concerned with ongoing bright red light bleeding. States does not run down leg or gush, but is consistently on pad about a quarter size, and when goes to the bathroom it is increased with clots. States has irregular cramping. Denies leaking of fluid or abnormal vaginal discharge. Endorses positive fetal movement.     DISPOSITION: Discuss with Provider and Call Back Patient      Answer Assessment - Initial Assessment Questions  1. ONSET: \"When did this bleeding start?\"           2. BLEEDING SEVERITY: \"Describe the bleeding that you are having.\" \"How much bleeding is there?\"       Bright red clots when urinating, a little more than a period, but not trickling down leg; On pad looks like about a quarter size;  3. ABDOMEN PAIN: \"Do you have any pain?\" \"How bad is the pain?\"  (e.g., Scale 0-10; none, mild, moderate, or severe)      Irregular contractions  4. PREGNANCY: \"Do you know how many weeks or " "months pregnant you are?\"       38w2d  5. LEONCIO: \"What date are you expecting to deliver?\"      4/21/25  6. FETAL MOVEMENT: \"Has the baby's movement decreased or changed significantly from normal?\"      Positive  7. HIGH-RISK PREGNANCY:  \"Have been told by your doctor that you have a high-risk condition that can cause bleeding?\"  (e.g., placenta previa, vasa previa)       NA  8. ULTRASOUND: \"Have you had an ultrasound during this pregnancy?\"  Note: To confirm intrauterine pregnancy, placenta location.      Yes  9. HEMODYNAMIC STATUS: \"Are you weak or feeling lightheaded?\" If Yes, ask: \"Can you stand and walk normally?\"       Denies  10. OTHER SYMPTOMS: \"What other symptoms are you having with the bleeding?\" (e.g., leaking fluid from vagina, contractions)        Denies    Protocols used: Pregnancy - Vaginal Bleeding Greater Than 20 Weeks EGA-Adult-[place]    "

## 2025-04-09 NOTE — UTILIZATION REVIEW
Initial Clinical Review    Admission: Date/Time/Statement:   Admission Orders (From admission, onward)       Ordered        25 1838  Inpatient Admission  Once                          Orders Placed This Encounter   Procedures    Inpatient Admission     Standing Status:   Standing     Number of Occurrences:   1     Level of Care:   Med Surg [16]     Estimated length of stay:   More than 2 Midnights     Certification:   I certify that inpatient services are medically necessary for this patient for a duration of greater than two midnights. See H&P and MD Progress Notes for additional information about the patient's course of treatment.     ED Arrival Information       Patient not seen in ED                       Chief Complaint   Patient presents with    Laboring       Initial Presentation: 34 y.o. female   HX gHTN, C/Section desires TOLAC,  at 38 weeks. Presents w Crampy abdominal pain, tightness  Inpatient admission due to Latent labor    Exam 4 hr exam w ?? Small change in cervix since admission,   Cervix: 3-4 cm  long   FHR: cat 1  CTXN: irreg q 2-5 min              PLAN  continuous fetal monitoring, Recheck one hour, IV PCN, IVF    @ 2247  EXAM   Contraction Frequency (minutes): 7-8  Contraction Intensity: Mild  Uterine Activity Characteristics: Irregular  Cervical Dilation: 4-5  Cervical Effacement: 20  Fetal Station: -3  Baseline Rate (FHR): 145 bpm  Fetal Heart Rate (FHT): 157 BPM  FHR Category: 1    Date:    Day 2:   @ 0455  Contraction Frequency (minutes): 5-6  Contraction Intensity: Mild  Uterine Activity Characteristics: Irregular  Cervical Dilation: 4-5  Cervical Effacement: 20  Fetal Station: -3  Baseline Rate (FHR): 130 bpm  Fetal Heart Rate (FHT): 157 BPM  FHR Category: 1   As per conversation with previously providing OB Attending MD, pt has made no more changes, ok for discharge. Given labor precautions     patient & partner prefer no augmentation of labor lives 10-10 min away  DC    @ 0510    ED Treatment-Medication Administration - No Administrations Displayed (No Start Event Found)       None            Scheduled Medications:  Medications 04/07 04/08    penicillin G (PFIZERPEN-G) in 0.9% sodium chloride 250 mL IVPB 5 Million Units  Dose: 5 Million Units  Freq: Once Route: IV  Start: 04/07/25 1845 End: 04/07/25 1954   Admin Instructions:      Order specific questions:       1854         Followed by   penicillin G (PFIZERPEN-G) in 0.9% sodium chloride 100 mL IVPB 2.5 Million Units  Dose: 2.5 Million Units  Freq: Every 4 hours Route: IV  Start: 04/07/25 2233 End: 04/08/25 0757   Admin Instructions:      Order specific questions:       2304      (1908) [C]     0757-D/C'd        Continuous IV Infusions:  Medications 04/07 04/08   lactated ringers infusion  Rate: 125 mL/hr Dose: 125 mL/hr  Freq: Continuous Route: IV  Indications of Use: IV Hydration  Last Dose: 125 mL/hr (04/07/25 2139)  Start: 04/07/25 1845 End: 04/08/25 0757 1855 1955 [C]     2139 0757 [C]     0757-D/C'd        PRN Meds:  No current facility-administered medications for this encounter.    ED Triage Vitals   Temperature Pulse Respirations Blood Pressure SpO2 Pain Score   04/07/25 1354 04/07/25 1354 04/07/25 1354 04/07/25 1354 04/07/25 1405 04/07/25 1928   (!) 96.2 °F (35.7 °C) 103 18 111/61 94 % 4     Weight (last 2 days) before discharge       None            Vital Signs (last 3 days) before discharge       Date/Time Temp Pulse Resp BP SpO2 O2 Device Patient Position - Orthostatic VS Pain    04/08/25 0400 98.4 °F (36.9 °C) 80 18 -- -- -- -- --    04/07/25 2303 98.4 °F (36.9 °C) 103 16 113/62 95 % -- Sitting --    04/07/25 1928 99 °F (37.2 °C) -- 16 -- -- None (Room air) Sitting 4    04/07/25 1645 -- 110 -- 118/57 -- -- -- --    04/07/25 1438 -- -- -- -- 87 % -- -- --    04/07/25 1410 -- 101 -- -- 96 % -- -- --    04/07/25 1405 -- 101 -- -- 94 % -- -- --    04/07/25 1354 96.2 °F (35.7 °C) 103 18 111/61 -- -- -- --       "        Pertinent Labs/Diagnostic Test Results:   Radiology:  No orders to display     Cardiology:  No orders to display     GI:  No orders to display           Results from last 7 days   Lab Units 04/07/25  1423   WBC Thousand/uL 11.52*   HEMOGLOBIN g/dL 14.0   HEMATOCRIT % 41.6   PLATELETS Thousands/uL 187   TOTAL NEUT ABS Thousands/µL 8.47*         Results from last 7 days   Lab Units 04/07/25  1423   SODIUM mmol/L 137   POTASSIUM mmol/L 3.6   CHLORIDE mmol/L 103   CO2 mmol/L 23   ANION GAP mmol/L 11   BUN mg/dL 4*   CREATININE mg/dL 0.44*   EGFR ml/min/1.73sq m 133   CALCIUM mg/dL 9.1     Results from last 7 days   Lab Units 04/07/25  1423   AST U/L 13   ALT U/L 7   ALK PHOS U/L 114*   TOTAL PROTEIN g/dL 7.3   ALBUMIN g/dL 3.7   TOTAL BILIRUBIN mg/dL 0.53         Results from last 7 days   Lab Units 04/07/25  1423   GLUCOSE RANDOM mg/dL 75             No results found for: \"BETA-HYDROXYBUTYRATE\"                 Results from last 7 days   Lab Units 04/03/25  0749   GLUCOSE UA  neg   PROTEIN UA  neg                                                   Past Medical History:   Diagnosis Date    Elevated blood pressure reading     single blood pressure elevation 3rd trimester first pregnancy, subsequent BP normal.    Varicella     childhood disease around 3 years old      Present on Admission:  **None**      Admitting Diagnosis: No admission diagnoses are documented for this encounter.  Age/Sex: 34 y.o. female    Network Utilization Review Department  ATTENTION: Please call with any questions or concerns to [phone] and carefully listen to the prompts so that you are directed to the right person. All voicemails are confidential.   For Discharge needs, contact Care Management DC Support Team at [phone] opt. 2  Send all requests for admission clinical reviews, approved or denied determinations and any other requests to dedicated fax number below belonging to the campus where the patient is receiving treatment. " List of dedicated fax numbers for the Facilities:  FACILITY NAME UR FAX NUMBER   ADMISSION DENIALS (Administrative/Medical Necessity) [phone]   DISCHARGE SUPPORT TEAM (NETWORK) [phone]   PARENT CHILD HEALTH (Maternity/NICU/Pediatrics) [phone]   [facility] [phone]   [facility] [phone]   [facility] [phone]   [facility] [phone]   [facility] [phone]   [facility] [phone]   [facility] [phone]   [facility] [phone]   [facility] [phone]   [facility] [phone]   [facility] [phone]   [facility] [phone]

## 2025-04-10 ENCOUNTER — ANESTHESIA EVENT (INPATIENT)
Dept: ANESTHESIOLOGY | Facility: HOSPITAL | Age: 35
End: 2025-04-10
Payer: COMMERCIAL

## 2025-04-10 ENCOUNTER — ROUTINE PRENATAL (OUTPATIENT)
Dept: OBGYN CLINIC | Facility: CLINIC | Age: 35
End: 2025-04-10
Payer: COMMERCIAL

## 2025-04-10 ENCOUNTER — ANESTHESIA (INPATIENT)
Dept: ANESTHESIOLOGY | Facility: HOSPITAL | Age: 35
End: 2025-04-10
Payer: COMMERCIAL

## 2025-04-10 ENCOUNTER — HOSPITAL ENCOUNTER (INPATIENT)
Facility: HOSPITAL | Age: 35
LOS: 2 days | Discharge: HOME | End: 2025-04-12
Attending: OBSTETRICS & GYNECOLOGY | Admitting: OBSTETRICS & GYNECOLOGY
Payer: COMMERCIAL

## 2025-04-10 VITALS
BODY MASS INDEX: 34.45 KG/M2 | WEIGHT: 201.8 LBS | HEIGHT: 64 IN | SYSTOLIC BLOOD PRESSURE: 128 MMHG | DIASTOLIC BLOOD PRESSURE: 82 MMHG

## 2025-04-10 DIAGNOSIS — O36.8390 FETAL TACHYCARDIA AFFECTING MANAGEMENT OF MOTHER: ICD-10-CM

## 2025-04-10 DIAGNOSIS — O09.293 PRIOR PREGNANCY WITH CONGENITAL CARDIAC DEFECT IN THIRD TRIMESTER, ANTEPARTUM: ICD-10-CM

## 2025-04-10 DIAGNOSIS — O34.219 VBAC, DELIVERED: Primary | ICD-10-CM

## 2025-04-10 DIAGNOSIS — Z98.891 HISTORY OF VBAC: ICD-10-CM

## 2025-04-10 DIAGNOSIS — O09.299 HX OF DELIVERY BY VACUUM EXTRACTION, CURRENTLY PREGNANT: ICD-10-CM

## 2025-04-10 DIAGNOSIS — Z3A.38 38 WEEKS GESTATION OF PREGNANCY: Primary | ICD-10-CM

## 2025-04-10 DIAGNOSIS — O99.820 GBS (GROUP B STREPTOCOCCUS CARRIER), +RV CULTURE, CURRENTLY PREGNANT: ICD-10-CM

## 2025-04-10 DIAGNOSIS — O34.219 PREGNANCY WITH HISTORY OF CESAREAN SECTION, ANTEPARTUM: ICD-10-CM

## 2025-04-10 DIAGNOSIS — Z87.59 HISTORY OF GESTATIONAL HYPERTENSION: ICD-10-CM

## 2025-04-10 PROBLEM — Z34.90 ENCOUNTER FOR INDUCTION OF LABOR: Status: ACTIVE | Noted: 2025-04-10

## 2025-04-10 PROBLEM — R03.0 ELEVATED BLOOD PRESSURE READING WITHOUT DIAGNOSIS OF HYPERTENSION: Status: ACTIVE | Noted: 2025-04-10

## 2025-04-10 LAB
ABO GROUP BLD: NORMAL
ALBUMIN SERPL BCG-MCNC: 3.4 G/DL (ref 3.5–5)
ALP SERPL-CCNC: 120 U/L (ref 34–104)
ALT SERPL W P-5'-P-CCNC: 6 U/L (ref 7–52)
ANION GAP SERPL CALCULATED.3IONS-SCNC: 10 MMOL/L (ref 4–13)
AST SERPL W P-5'-P-CCNC: 12 U/L (ref 13–39)
BILIRUB SERPL-MCNC: 0.5 MG/DL (ref 0.2–1)
BLD GP AB SCN SERPL QL: NEGATIVE
BUN SERPL-MCNC: 3 MG/DL (ref 5–25)
CALCIUM ALBUM COR SERPL-MCNC: 9.1 MG/DL (ref 8.3–10.1)
CALCIUM SERPL-MCNC: 8.6 MG/DL (ref 8.4–10.2)
CHLORIDE SERPL-SCNC: 104 MMOL/L (ref 96–108)
CO2 SERPL-SCNC: 23 MMOL/L (ref 21–32)
CREAT SERPL-MCNC: 0.38 MG/DL (ref 0.6–1.3)
CREAT UR-MCNC: 46.3 MG/DL
ERYTHROCYTE [DISTWIDTH] IN BLOOD BY AUTOMATED COUNT: 14.3 % (ref 11.6–15.1)
GFR SERPL CREATININE-BSD FRML MDRD: 139 ML/MIN/1.73SQ M
GLUCOSE SERPL-MCNC: 75 MG/DL (ref 65–140)
HCT VFR BLD AUTO: 43.4 % (ref 34.8–46.1)
HGB BLD-MCNC: 14.2 G/DL (ref 11.5–15.4)
HOLD SPECIMEN: NORMAL
MCH RBC QN AUTO: 29 PG (ref 26.8–34.3)
MCHC RBC AUTO-ENTMCNC: 32.7 G/DL (ref 31.4–37.4)
MCV RBC AUTO: 89 FL (ref 82–98)
PLATELET # BLD AUTO: 192 THOUSANDS/UL (ref 149–390)
PMV BLD AUTO: 9.8 FL (ref 8.9–12.7)
POTASSIUM SERPL-SCNC: 3.6 MMOL/L (ref 3.5–5.3)
PROT SERPL-MCNC: 6.2 G/DL (ref 6.4–8.4)
PROT UR-MCNC: 9.4 MG/DL
PROT/CREAT UR: 0.2 MG/G{CREAT}
RBC # BLD AUTO: 4.9 MILLION/UL (ref 3.81–5.12)
RH BLD: POSITIVE
SL AMB  POCT GLUCOSE, UA: NORMAL
SL AMB POCT URINE PROTEIN: NORMAL
SODIUM SERPL-SCNC: 137 MMOL/L (ref 135–147)
SPECIMEN EXPIRATION DATE: NORMAL
WBC # BLD AUTO: 11.16 THOUSAND/UL (ref 4.31–10.16)

## 2025-04-10 PROCEDURE — PNV: Performed by: NURSE PRACTITIONER

## 2025-04-10 PROCEDURE — 84156 ASSAY OF PROTEIN URINE: CPT | Performed by: OBSTETRICS & GYNECOLOGY

## 2025-04-10 PROCEDURE — 99213 OFFICE O/P EST LOW 20 MIN: CPT

## 2025-04-10 PROCEDURE — NC001 PR NO CHARGE: Performed by: OBSTETRICS & GYNECOLOGY

## 2025-04-10 PROCEDURE — 86900 BLOOD TYPING SEROLOGIC ABO: CPT | Performed by: OBSTETRICS & GYNECOLOGY

## 2025-04-10 PROCEDURE — 82570 ASSAY OF URINE CREATININE: CPT | Performed by: OBSTETRICS & GYNECOLOGY

## 2025-04-10 PROCEDURE — 86901 BLOOD TYPING SEROLOGIC RH(D): CPT | Performed by: OBSTETRICS & GYNECOLOGY

## 2025-04-10 PROCEDURE — 0HQ9XZZ REPAIR PERINEUM SKIN, EXTERNAL APPROACH: ICD-10-PCS | Performed by: OBSTETRICS & GYNECOLOGY

## 2025-04-10 PROCEDURE — 86780 TREPONEMA PALLIDUM: CPT | Performed by: OBSTETRICS & GYNECOLOGY

## 2025-04-10 PROCEDURE — 81002 URINALYSIS NONAUTO W/O SCOPE: CPT | Performed by: NURSE PRACTITIONER

## 2025-04-10 PROCEDURE — G0463 HOSPITAL OUTPT CLINIC VISIT: HCPCS

## 2025-04-10 PROCEDURE — 3E0R3BZ INTRODUCTION OF ANESTHETIC AGENT INTO SPINAL CANAL, PERCUTANEOUS APPROACH: ICD-10-PCS | Performed by: OBSTETRICS & GYNECOLOGY

## 2025-04-10 PROCEDURE — 80053 COMPREHEN METABOLIC PANEL: CPT | Performed by: OBSTETRICS & GYNECOLOGY

## 2025-04-10 PROCEDURE — 85027 COMPLETE CBC AUTOMATED: CPT | Performed by: OBSTETRICS & GYNECOLOGY

## 2025-04-10 PROCEDURE — 86850 RBC ANTIBODY SCREEN: CPT | Performed by: OBSTETRICS & GYNECOLOGY

## 2025-04-10 RX ORDER — SODIUM CHLORIDE, SODIUM LACTATE, POTASSIUM CHLORIDE, CALCIUM CHLORIDE 600; 310; 30; 20 MG/100ML; MG/100ML; MG/100ML; MG/100ML
125 INJECTION, SOLUTION INTRAVENOUS CONTINUOUS
Status: DISCONTINUED | OUTPATIENT
Start: 2025-04-10 | End: 2025-04-10

## 2025-04-10 RX ORDER — OXYTOCIN/RINGER'S LACTATE 30/500 ML
1-30 PLASTIC BAG, INJECTION (ML) INTRAVENOUS
Status: DISCONTINUED | OUTPATIENT
Start: 2025-04-10 | End: 2025-04-11

## 2025-04-10 RX ORDER — ONDANSETRON 2 MG/ML
4 INJECTION INTRAMUSCULAR; INTRAVENOUS EVERY 6 HOURS PRN
Status: DISCONTINUED | OUTPATIENT
Start: 2025-04-10 | End: 2025-04-11 | Stop reason: SDUPTHER

## 2025-04-10 RX ORDER — LIDOCAINE HYDROCHLORIDE AND EPINEPHRINE 15; 5 MG/ML; UG/ML
INJECTION, SOLUTION EPIDURAL AS NEEDED
Status: DISCONTINUED | OUTPATIENT
Start: 2025-04-10 | End: 2025-04-20 | Stop reason: HOSPADM

## 2025-04-10 RX ORDER — LIDOCAINE HYDROCHLORIDE AND EPINEPHRINE 15; 5 MG/ML; UG/ML
INJECTION, SOLUTION EPIDURAL AS NEEDED
Status: DISCONTINUED | OUTPATIENT
Start: 2025-04-10 | End: 2025-04-10

## 2025-04-10 RX ORDER — BUPIVACAINE HYDROCHLORIDE 2.5 MG/ML
30 INJECTION, SOLUTION EPIDURAL; INFILTRATION; INTRACAUDAL; PERINEURAL ONCE AS NEEDED
Status: DISCONTINUED | OUTPATIENT
Start: 2025-04-10 | End: 2025-04-11

## 2025-04-10 RX ORDER — LIDOCAINE HYDROCHLORIDE 10 MG/ML
INJECTION, SOLUTION EPIDURAL; INFILTRATION; INTRACAUDAL; PERINEURAL AS NEEDED
Status: DISCONTINUED | OUTPATIENT
Start: 2025-04-10 | End: 2025-04-20 | Stop reason: HOSPADM

## 2025-04-10 RX ORDER — SODIUM CHLORIDE, SODIUM LACTATE, POTASSIUM CHLORIDE, CALCIUM CHLORIDE 600; 310; 30; 20 MG/100ML; MG/100ML; MG/100ML; MG/100ML
125 INJECTION, SOLUTION INTRAVENOUS CONTINUOUS
Status: DISCONTINUED | OUTPATIENT
Start: 2025-04-10 | End: 2025-04-11

## 2025-04-10 RX ADMIN — SODIUM CHLORIDE, SODIUM LACTATE, POTASSIUM CHLORIDE, AND CALCIUM CHLORIDE 125 ML/HR: .6; .31; .03; .02 INJECTION, SOLUTION INTRAVENOUS at 22:55

## 2025-04-10 RX ADMIN — LIDOCAINE HYDROCHLORIDE,EPINEPHRINE BITARTRATE 3 ML: 15; .005 INJECTION, SOLUTION EPIDURAL; INFILTRATION; INTRACAUDAL; PERINEURAL at 21:31

## 2025-04-10 RX ADMIN — SODIUM CHLORIDE 2.5 MILLION UNITS: 9 INJECTION, SOLUTION INTRAVENOUS at 16:17

## 2025-04-10 RX ADMIN — ROPIVACAINE HYDROCHLORIDE: 2 INJECTION, SOLUTION EPIDURAL; INFILTRATION at 21:35

## 2025-04-10 RX ADMIN — SODIUM CHLORIDE 5 MILLION UNITS: 0.9 INJECTION, SOLUTION INTRAVENOUS at 12:21

## 2025-04-10 RX ADMIN — Medication 2 MILLI-UNITS/MIN: at 12:21

## 2025-04-10 RX ADMIN — LIDOCAINE HYDROCHLORIDE 3 ML: 10 INJECTION, SOLUTION EPIDURAL; INFILTRATION; INTRACAUDAL; PERINEURAL at 21:16

## 2025-04-10 RX ADMIN — SODIUM CHLORIDE, SODIUM LACTATE, POTASSIUM CHLORIDE, AND CALCIUM CHLORIDE 125 ML/HR: .6; .31; .03; .02 INJECTION, SOLUTION INTRAVENOUS at 12:20

## 2025-04-10 RX ADMIN — LIDOCAINE HYDROCHLORIDE,EPINEPHRINE BITARTRATE 2 ML: 15; .005 INJECTION, SOLUTION EPIDURAL; INFILTRATION; INTRACAUDAL; PERINEURAL at 21:34

## 2025-04-10 RX ADMIN — LIDOCAINE HYDROCHLORIDE 3 ML: 10 INJECTION, SOLUTION EPIDURAL; INFILTRATION; INTRACAUDAL; PERINEURAL at 21:26

## 2025-04-10 RX ADMIN — SODIUM CHLORIDE 2.5 MILLION UNITS: 9 INJECTION, SOLUTION INTRAVENOUS at 20:05

## 2025-04-10 NOTE — OB LABOR/OXYTOCIN SAFETY PROGRESS
Labor Progress Note - Deyanira Gómez 34 y.o. female MRN: [id number]    Unit/Bed#: -01 Encounter: [id number]    Dose (duy-units/min) Oxytocin: 6 duy-units/min  Contraction Frequency (minutes): 2-3  Contraction Intensity: Mild  Uterine Activity Characteristics: Occasional  Cervical Dilation: 3        Cervical Effacement: 20  Fetal Station: Ballotable  Baseline Rate (FHR): 150 bpm  Fetal Heart Rate (FHT): 141 BPM  FHR Category: 1               Vital Signs:   Vitals:    04/10/25 1457   BP: 123/58   Pulse: 100   Resp: 16   Temp: 98.1 °F (36.7 °C)       Notes/comments:   Cervix unchanged. Attempted AROM, however no fluid noted. Will monitor for now.       Yumiko Cool DO 4/10/2025 4:20 PM

## 2025-04-10 NOTE — OB LABOR/OXYTOCIN SAFETY PROGRESS
Labor Progress Note - Deyanira Gómez 34 y.o. female MRN: [id number]    Unit/Bed#: -01 Encounter: [id number]    Dose (duy-units/min) Oxytocin: 8 duy-units/min  Contraction Frequency (minutes): 2-3  Contraction Intensity: Mild  Uterine Activity Characteristics: Regular  Cervical Dilation: 4-5        Cervical Effacement: 50  Fetal Station: -2  Baseline Rate (FHR): 145 bpm  Fetal Heart Rate (FHT): 141 BPM  FHR Category: I     Vitals:    04/10/25 1824   BP: 118/56   Pulse: 90   Resp: 18   Temp: 98.4 °F (36.9 °C)       Notes/comments:   Leaking copious fluid.  Cervix 4-5cm dilated.  --> Continue oxytocin.      Guzman Schmitz MD 4/10/2025 6:48 PM

## 2025-04-10 NOTE — ASSESSMENT & PLAN NOTE
-175 by doppler in office today, persistent, no return to baseline with 4+minutes of doppler.   Pt to labor and delivery for monitoring.

## 2025-04-10 NOTE — PROGRESS NOTES
"Routine Prenatal Visit  [facility] [place]  [address], [place] [address]    Assessment/Plan:  Deyanira is a 34 y.o. year old  at 38w3d who presents for routine prenatal visit.     1. 38 weeks gestation of pregnancy  Assessment & Plan:  Pt to L+D for fetal tachycardia  Orders:  -     POCT urine dip  2. Pregnancy with history of  section, antepartum  3. History of   4. History of gestational hypertension  5. Hx of delivery by vacuum extraction, currently pregnant  6. 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 GBS (group B Streptococcus carrier), +RV culture, currently pregnant  8. Fetal tachycardia affecting management of mother  Assessment & Plan:  -175 by doppler in office today, persistent, no return to baseline with 4+minutes of doppler.   Pt to labor and delivery for monitoring.         Next OB Visit 1 weeks.    Subjective:     CC: Prenatal care    Deyanira Gómez is a 34 y.o.  female who presents for routine prenatal care at 38w3d. Seen on L+D 2 days ago with contractions, cervix went from 3 cm to 5 cm but not angie in a good pattern, sent home. Reports vaginal bleeding like a heavy period for last 2 days, BRB with some clots, which has tapered off this morning. Still having irregular contractions.   Pregnancy ROS: no leakage of fluid, pelvic pain, or vaginal bleeding.  normal fetal movement.    The following portions of the patient's history were reviewed and updated as appropriate: allergies, current medications, past family history, past medical history, obstetric history, gynecologic history, past social history, past surgical history and problem list.      Objective:  /82 (BP Location: Left arm, Patient Position: Sitting, Cuff Size: Standard)   Ht 5' 4\" (1.626 m)   Wt 91.5 kg (201 lb 12.8 oz)   LMP 07/15/2024   BMI 34.64 kg/m²   Pregravid Weight/BMI: 73.9 kg (163 lb) (BMI 27.97)  Current Weight: 91.5 kg " (201 lb 12.8 oz)   Total Weight Gain: 17.6 kg (38 lb 12.8 oz)   Pre- Vitals      Flowsheet Row Most Recent Value   Prenatal Assessment    Fetal Heart Rate 165  [-175]   Movement Present   Presentation Vertex   Prenatal Vitals    Blood Pressure 128/82   Weight - Scale 91.5 kg (201 lb 12.8 oz)   Urine Albumin/Glucose    Dilation/Effacement/Station    Vaginal Drainage    Draining Fluid No   Edema    LLE Edema None   RLE Edema None             General: Well appearing, no distress  Abdomen: Soft, gravid, nontender  Extremities: Non tender.

## 2025-04-10 NOTE — UTILIZATION REVIEW
NOTIFICATION OF ADMISSION DISCHARGE   This is a Notification of Discharge from [facility]. Please be advised that this patient has been discharge from our facility. Below you will find the admission and discharge date and time including the patient’s disposition.   UTILIZATION REVIEW CONTACT:  Utilization Review Assistants  Network Utilization Review Department  Phone: [phone] x carefully listen to the prompts. All voicemails are confidential.  Email: [email or web address]     ADMISSION INFORMATION  PRESENTATION DATE: 4/7/2025  1:35 PM  OBERVATION ADMISSION DATE: N/A  INPATIENT ADMISSION DATE: 4/7/25  6:38 PM   DISCHARGE DATE: 4/8/2025  5:10 AM   DISPOSITION:Home/Self Care    Network Utilization Review Department  ATTENTION: Please call with any questions or concerns to [phone] and carefully listen to the prompts so that you are directed to the right person. All voicemails are confidential.   For Discharge needs, contact Care Management DC Support Team at [phone] opt. 2  Send all requests for admission clinical reviews, approved or denied determinations and any other requests to dedicated fax number below belonging to the campus where the patient is receiving treatment. List of dedicated fax numbers for the Facilities:  FACILITY NAME UR FAX NUMBER   ADMISSION DENIALS (Administrative/Medical Necessity) [phone]   DISCHARGE SUPPORT TEAM ([facility]) [phone]   PARENT CHILD HEALTH (Maternity/NICU/Pediatrics) [phone]   [facility] [phone]   [facility] [phone]   [facility] [phone]   [facility] [phone]   [facility] [phone]   [facility] [phone]   [facility] [phone]   [facility] [phone]   [facility]  [facility] [phone]   [facility] [phone]   [facility] [phone]   [facility] [phone]

## 2025-04-10 NOTE — ASSESSMENT & PLAN NOTE
I discussed with Ms. Gómez in detail the risks, benefits, and alternatives to induction of labor in this setting.  The risks discussed included, but were not limited to: the risk of uterine rupture, with its attendant risks of hysterectomy and maternal and or fetal death; the risk of fetal hypoxic stress and permanent neurologic injury; as well as the risk of a prolonged induction ending in  section.  Following our discussion of risks, benefits, and alternatives, all of Ms. Gómez's questions were answered, and she stated her understanding, and her desire to assume these risks and to proceed with induction of labor.    Parous, with adequate pelvis.  Cephalic, EFW 3400grams.  Cervix 3cm dilated and long.  --> Oxytocin.

## 2025-04-10 NOTE — ASSESSMENT & PLAN NOTE
Equivocal-suspicious CST.  Had reactive NST.  On continued monitoring, had intermittent variable decels (1109, 1121), and late decel at 1134.  Normal baseline, moderate variability currently.    Now 38+ weeks and delivery indicated.  Discussed with her.  Recommend delivery, and she agrees with this.  --> Admit.  Labs/toco/IVF.   --> Delivery.

## 2025-04-10 NOTE — ASSESSMENT & PLAN NOTE
First delivery was via  section for fetal indications.  Second pregnancy delivered vaginally.      During pregnancy, she has been counseled on her delivery options, and she has elected a Trial of Labor After  (ToLAC).  She reaffirms this decision today.  Today, I reviewed with her the R/B/A to ToLAC.  Risks discussed included, but were not limited to the risk of uterine rupture, hysterectomy, and fetal and/or maternal death.  Following this discussion of R/B/A, Ms. Gómez stated her understanding and her desire to proceed with ToLAC.  --> For ToLAC.  --> Avoid prostaglandins.  --> Anesthesia, Ob in-house and immediately available.

## 2025-04-10 NOTE — H&P
Ob/Gyn History and Physical  Deyanira Gómez 34 y.o. female MRN: [id number]  Unit/Bed#: LD TRIAGE 2- Encounter: [id number]    A/P. 34 y.o.  at 38w3d, with equivocal-suspicious CST  Assessment & Plan  38 weeks gestation of pregnancy  Equivocal-suspicious CST.  Had reactive NST.  On continued monitoring, had intermittent variable decels (1109, 1121), and late decel at 1134.  Normal baseline, moderate variability currently.    Now 38+ weeks and delivery indicated.  Discussed with her.  Recommend delivery, and she agrees with this.  --> Admit.  Labs/toco/IVF.   --> Delivery.  Pregnancy with history of  section, antepartum  First delivery was via  section for fetal indications.  Second pregnancy delivered vaginally.      During pregnancy, she has been counseled on her delivery options, and she has elected a Trial of Labor After  (ToLAC).  She reaffirms this decision today.  Today, I reviewed with her the R/B/A to ToLAC.  Risks discussed included, but were not limited to the risk of uterine rupture, hysterectomy, and fetal and/or maternal death.  Following this discussion of R/B/A, Ms. Gómez stated her understanding and her desire to proceed with ToLAC.  --> For ToLAC.  --> Avoid prostaglandins.  --> Anesthesia, Ob in-house and immediately available.  Encounter for induction of labor  I discussed with Ms. Gómez in detail the risks, benefits, and alternatives to induction of labor in this setting.  The risks discussed included, but were not limited to: the risk of uterine rupture, with its attendant risks of hysterectomy and maternal and or fetal death; the risk of fetal hypoxic stress and permanent neurologic injury; as well as the risk of a prolonged induction ending in  section.  Following our discussion of risks, benefits, and alternatives, all of Ms. Gómez's questions were answered, and she stated her understanding, and her desire to assume these risks and to proceed with  "induction of labor.    Parous, with adequate pelvis.  Cephalic, EFW 3400grams.  Cervix 3cm dilated and long.  --> Oxytocin.  GBS (group B Streptococcus carrier), +RV culture, currently pregnant  --> Penicillin.  Elevated blood pressure reading without diagnosis of hypertension  Initial BP here 149/82.  All subsequent BP have been normal.  No symptoms of severe preeclampsia; laboratory studies without evidence of end-organ damage.  --> Follow BP/Sx.  Category I fetal heart rate tracing during labor and delivery  Fetal status currently category I.  --> Continuous fetal monitoring.    Guzman Schmitz MD  04/10/25  Case discussed with on-call physician for Raisa Irwin, Dr. Cool.    -------------------------------------------------------------------------------------------------------  CC/    \"They sent me from the office.\"    HPI/    Feels well.  No UC/VB/LoF.  (+)FM.    No HA/VF changes.    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     Current Outpatient Medications on File Prior to Encounter   Medication Sig Dispense Refill    Prenatal Vit-Fe Fumarate-FA (Prenatal Vitamin) 27-0.8 MG TABS Take by mouth         PMH/      Past Medical History:   Diagnosis Date    " Elevated blood pressure reading     single blood pressure elevation 3rd trimester first pregnancy, subsequent BP normal.    Varicella     childhood disease around 3 years old        PSH/      Past Surgical History:   Procedure Laterality Date     SECTION      TN  DELIVERY ONLY N/A 2016    Procedure:  SECTION ();  Surgeon: Memo Chun MD;  Location: [facility];  Service: Obstetrics    WISDOM TOOTH EXTRACTION Bilateral        ObHx/    , expecting a baby boy named August      OB History    Para Term  AB Living   3 2 2 0 0 2   SAB IAB Ectopic Multiple Live Births   0 0 0 0 2      # Outcome Date GA Lbr Preet/2nd Weight Sex Type Anes PTL Lv   3 Current            2 Term 04/10/18 37w2d / 00:20 2845 g (6 lb 4.4 oz) M Vag-Vacuum EPI N IRENA      Birth Comments: Baby with Down's, VSD      Name: LEANDERBABY BOY  (DANIELLE)      Apgar1: 8  Apgar5: 9   1 Term 16 40w5d  3140 g (6 lb 14.8 oz) F CS-LTranv EPI N IRENA      Complications: Fetal Intolerance      Name: LEANDERBABY GIRL DANIELLE      Apgar1: 9  Apgar5: 9       GynHx/    There is no history of sexually-transmitted infections.    FH/         Family History   Adopted: Yes   Problem Relation Age of Onset    No Known Problems Daughter     Early death Maternal Grandmother     Down syndrome Child     Heart defect Child        SH/    She is .    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    She does not use tobacco, alcohol, or illicit drugs.        Social History     Socioeconomic History    Marital status: /Civil Union     Spouse name: Not on file    Number of children: Not on file    Years of education: Not on file    Highest education level: Not on file   Occupational History    Occupation: Student   Tobacco Use    Smoking status: Never     Passive exposure: Never    Smokeless tobacco: Never   Vaping Use    Vaping status: Never Used   Substance and Sexual Activity    Alcohol use: Not Currently    Drug use:  Never    Sexual activity: Yes     Partners: Male   Other Topics Concern    Not on file   Social History Narrative    Feels safe at home    Single (as per [employer])     Social Drivers of Health     Financial Resource Strain: Not on file   Food Insecurity: No Food Insecurity (2025)    Nursing - Inadequate Food Risk Classification     Worried About Running Out of Food in the Last Year: Never true     Ran Out of Food in the Last Year: Never true     Ran Out of Food in the Last Year: Never true   Transportation Needs: No Transportation Needs (2025)    Nursing - Transportation Risk Classification     Lack of Transportation: Not on file     Lack of Transportation: No   Physical Activity: Not on file   Stress: Not on file   Social Connections: Not on file   Intimate Partner Violence: Unknown (2025)    Nursing IPS     Feels Physically and Emotionally Safe: Not on file     Physically Hurt by Someone: Not on file     Humiliated or Emotionally Abused by Someone: Not on file     Physically Hurt by Someone: No     Hurt or Threatened by Someone: No   Housing Stability: Unknown (2025)    Nursing: Inadequate Housing Risk Classification     Has Housing: Not on file     Worried About Losing Housing: Not on file     Unable to Get Utilities: Not on file     Unable to Pay for Housing in the Last Year: No     Has Housin       RoS/    Constitutional: Negative    CV: Negative    Pulm: Negative    GI: Negative    Urinary: Negative    Neuro: Negative    Musculoskeletal: Negative    O/  /57   Pulse 96   LMP 07/15/2024     Alert, comfortable, no acute distress    Regular rate and rhythm    Clear to auscultation bilaterally    Abdomen soft, nontender, nondistended.    Fundus nontender, size consistent with dates      Cephalic      EFW 3400    Gyn/      Normal female external genitalia.      Vault well-estrogenized, well-supported.      Pelvis adequate/gynecoid.        Cervix:           Dilation: 3cm          Effacement: 20%         Station: Ballotable  Consistency: Medium         Position: Posterior   Presentation: Vertex    FHR: Initially 150 moderate variability, intermittent variable decels, isolated late decel           Now 130 moderate variability.  (+) accels, no decels.    Robins:  Irregular    Ultrasound:  Cephalic    Prenatal labs/  Lab Results   Component Value Date    ABO AB 04/07/2025    RH Positive 04/07/2025    ABS Negative 04/07/2025    HGB 14.0 04/07/2025     04/07/2025    EXTRUBELIGGQ immune 10/14/2017    RPR Non-Reactive 04/10/2018    HEPBSAG Non-reactive 09/25/2024    HEPCAB Non-reactive 09/25/2024    HIVAGAB NON-REACTIVE 02/09/2018    GC negative 10/19/2017    [id number] 133 01/27/2025         GBS:  Positive

## 2025-04-10 NOTE — ASSESSMENT & PLAN NOTE
Initial BP here 149/82.  All subsequent BP have been normal.  No symptoms of severe preeclampsia; laboratory studies without evidence of end-organ damage.  --> Follow BP/Sx.

## 2025-04-11 LAB
BASE EXCESS BLDCOA CALC-SCNC: -5.5 MMOL/L (ref 3–11)
BASE EXCESS BLDCOV CALC-SCNC: -3.8 MMOL/L (ref 1–9)
BASOPHILS # BLD AUTO: 0.04 THOUSANDS/ÂΜL (ref 0–0.1)
BASOPHILS NFR BLD AUTO: 0 % (ref 0–1)
EOSINOPHIL # BLD AUTO: 0.02 THOUSAND/ÂΜL (ref 0–0.61)
EOSINOPHIL NFR BLD AUTO: 0 % (ref 0–6)
ERYTHROCYTE [DISTWIDTH] IN BLOOD BY AUTOMATED COUNT: 14.2 % (ref 11.6–15.1)
HCO3 BLDCOA-SCNC: 22.1 MMOL/L (ref 17.3–27.3)
HCO3 BLDCOV-SCNC: 21.7 MMOL/L (ref 12.2–28.6)
HCT VFR BLD AUTO: 37.6 % (ref 34.8–46.1)
HGB BLD-MCNC: 12.3 G/DL (ref 11.5–15.4)
IMM GRANULOCYTES # BLD AUTO: 0.26 THOUSAND/UL (ref 0–0.2)
IMM GRANULOCYTES NFR BLD AUTO: 2 % (ref 0–2)
LYMPHOCYTES # BLD AUTO: 1.71 THOUSANDS/ÂΜL (ref 0.6–4.47)
LYMPHOCYTES NFR BLD AUTO: 10 % (ref 14–44)
MCH RBC QN AUTO: 29.1 PG (ref 26.8–34.3)
MCHC RBC AUTO-ENTMCNC: 32.7 G/DL (ref 31.4–37.4)
MCV RBC AUTO: 89 FL (ref 82–98)
MONOCYTES # BLD AUTO: 1.28 THOUSAND/ÂΜL (ref 0.17–1.22)
MONOCYTES NFR BLD AUTO: 7 % (ref 4–12)
NEUTROPHILS # BLD AUTO: 14.38 THOUSANDS/ÂΜL (ref 1.85–7.62)
NEUTS SEG NFR BLD AUTO: 81 % (ref 43–75)
NRBC BLD AUTO-RTO: 0 /100 WBCS
O2 CT VFR BLDCOA CALC: 10 ML/DL
OXYHGB MFR BLDCOA: 42.4 %
OXYHGB MFR BLDCOV: 64.3 %
PCO2 BLDCOA: 50.6 MM[HG] (ref 30–60)
PCO2 BLDCOV: 41 MM HG (ref 27–43)
PH BLDCOA: 7.26 [PH] (ref 7.23–7.43)
PH BLDCOV: 7.34 [PH] (ref 7.19–7.49)
PLATELET # BLD AUTO: 171 THOUSANDS/UL (ref 149–390)
PMV BLD AUTO: 9.8 FL (ref 8.9–12.7)
PO2 BLDCOA: 21.3 MM HG (ref 5–25)
PO2 BLDCOV: 26.5 MM HG (ref 15–45)
RBC # BLD AUTO: 4.23 MILLION/UL (ref 3.81–5.12)
SAO2 % BLDCOV: 15.4 ML/DL
TREPONEMA PALLIDUM IGG+IGM AB [PRESENCE] IN SERUM OR PLASMA BY IMMUNOASSAY: NORMAL
WBC # BLD AUTO: 17.69 THOUSAND/UL (ref 4.31–10.16)

## 2025-04-11 PROCEDURE — NC001 PR NO CHARGE: Performed by: ADVANCED PRACTICE MIDWIFE

## 2025-04-11 PROCEDURE — 82805 BLOOD GASES W/O2 SATURATION: CPT | Performed by: OBSTETRICS & GYNECOLOGY

## 2025-04-11 PROCEDURE — 85025 COMPLETE CBC W/AUTO DIFF WBC: CPT | Performed by: OBSTETRICS & GYNECOLOGY

## 2025-04-11 RX ORDER — BENZOCAINE/MENTHOL 6 MG-10 MG
1 LOZENGE MUCOUS MEMBRANE DAILY PRN
Status: DISCONTINUED | OUTPATIENT
Start: 2025-04-11 | End: 2025-04-12 | Stop reason: HOSPADM

## 2025-04-11 RX ORDER — ACETAMINOPHEN 325 MG/1
650 TABLET ORAL EVERY 4 HOURS PRN
Status: DISCONTINUED | OUTPATIENT
Start: 2025-04-11 | End: 2025-04-12 | Stop reason: HOSPADM

## 2025-04-11 RX ORDER — CALCIUM CARBONATE 500 MG/1
1000 TABLET, CHEWABLE ORAL DAILY PRN
Status: DISCONTINUED | OUTPATIENT
Start: 2025-04-11 | End: 2025-04-12 | Stop reason: HOSPADM

## 2025-04-11 RX ORDER — ONDANSETRON 2 MG/ML
4 INJECTION INTRAMUSCULAR; INTRAVENOUS EVERY 8 HOURS PRN
Status: DISCONTINUED | OUTPATIENT
Start: 2025-04-11 | End: 2025-04-12 | Stop reason: HOSPADM

## 2025-04-11 RX ORDER — DOCUSATE SODIUM 100 MG/1
100 CAPSULE, LIQUID FILLED ORAL 2 TIMES DAILY
Status: DISCONTINUED | OUTPATIENT
Start: 2025-04-11 | End: 2025-04-12 | Stop reason: HOSPADM

## 2025-04-11 RX ORDER — OXYCODONE HYDROCHLORIDE 5 MG/1
5 TABLET ORAL
Refills: 0 | Status: DISCONTINUED | OUTPATIENT
Start: 2025-04-11 | End: 2025-04-12 | Stop reason: HOSPADM

## 2025-04-11 RX ORDER — OXYTOCIN/RINGER'S LACTATE 30/500 ML
250 PLASTIC BAG, INJECTION (ML) INTRAVENOUS ONCE
Status: DISCONTINUED | OUTPATIENT
Start: 2025-04-11 | End: 2025-04-12 | Stop reason: HOSPADM

## 2025-04-11 RX ORDER — IBUPROFEN 600 MG/1
600 TABLET, FILM COATED ORAL EVERY 6 HOURS
Status: DISCONTINUED | OUTPATIENT
Start: 2025-04-11 | End: 2025-04-12 | Stop reason: HOSPADM

## 2025-04-11 RX ORDER — DIPHENHYDRAMINE HCL 25 MG
25 TABLET ORAL EVERY 6 HOURS PRN
Status: DISCONTINUED | OUTPATIENT
Start: 2025-04-11 | End: 2025-04-12 | Stop reason: HOSPADM

## 2025-04-11 RX ORDER — SIMETHICONE 80 MG
80 TABLET,CHEWABLE ORAL 4 TIMES DAILY PRN
Status: DISCONTINUED | OUTPATIENT
Start: 2025-04-11 | End: 2025-04-12 | Stop reason: HOSPADM

## 2025-04-11 RX ADMIN — IBUPROFEN 600 MG: 600 TABLET ORAL at 13:05

## 2025-04-11 RX ADMIN — IBUPROFEN 600 MG: 600 TABLET ORAL at 18:25

## 2025-04-11 RX ADMIN — IBUPROFEN 600 MG: 600 TABLET ORAL at 06:17

## 2025-04-11 RX ADMIN — BENZOCAINE AND LEVOMENTHOL 1 APPLICATION: 200; 5 SPRAY TOPICAL at 02:11

## 2025-04-11 RX ADMIN — DOCUSATE SODIUM 100 MG: 100 CAPSULE, LIQUID FILLED ORAL at 18:25

## 2025-04-11 RX ADMIN — IBUPROFEN 600 MG: 600 TABLET ORAL at 00:49

## 2025-04-11 RX ADMIN — DOCUSATE SODIUM 100 MG: 100 CAPSULE, LIQUID FILLED ORAL at 08:16

## 2025-04-11 NOTE — PLAN OF CARE
Problem: Knowledge Deficit  Goal: Verbalizes understanding of labor plan  Description: Assess patient/family/caregiver's baseline knowledge level and ability to understand information.  Provide education via patient/family/caregiver's preferred learning method at appropriate level of understanding. 1. Provide teaching at level of understanding.2. Provide teaching via preferred learning method(s).  Outcome: Completed  Goal: Patient/family/caregiver demonstrates understanding of disease process, treatment plan, medications, and discharge instructions  Description: Complete learning assessment and assess knowledge base.Interventions:- Provide teaching at level of understanding- Provide teaching via preferred learning methods  Outcome: Completed     Problem: Labor & Delivery  Goal: Manages discomfort  Description: Assess and monitor for signs and symptoms of discomfort.  Assess patient's pain level regularly and per hospital policy.  Administer medications as ordered. Support use of nonpharmacological methods to help control pain such as distraction, imagery, relaxation, and application of heat and cold.  Collaborate with interdisciplinary team and patient to determine appropriate pain management plan.1. Include patient in decisions related to comfort.2. Offer non-pharmacological pain management interventions.3. Report ineffective pain management to physician.  Outcome: Completed  Goal: Patient vital signs are stable  Description: 1. Assess vital signs - vaginal delivery.  Outcome: Completed     Problem: PAIN - ADULT  Goal: Verbalizes/displays adequate comfort level or baseline comfort level  Description: Interventions:- Encourage patient to monitor pain and request assistance- Assess pain using appropriate pain scale- Administer analgesics based on type and severity of pain and evaluate response- Implement non-pharmacological measures as appropriate and evaluate response- Consider cultural and social influences on  pain and pain management- Notify physician/advanced practitioner if interventions unsuccessful or patient reports new pain  Outcome: Progressing     Problem: SAFETY ADULT  Goal: Patient will remain free of falls  Description: INTERVENTIONS:- Educate patient/family on patient safety including physical limitations- Instruct patient to call for assistance with activity - Consult OT/PT to assist with strengthening/mobility - Keep Call bell within reach- Keep bed low and locked with side rails adjusted as appropriate- Keep care items and personal belongings within reach- Initiate and maintain comfort rounds- Make Fall Risk Sign visible to staff-Apply yellow socks and bracelet for high fall risk patients- Consider moving patient to room near nurses station  Outcome: Progressing  Goal: Maintain or return to baseline ADL function  Description: INTERVENTIONS:-  Assess patient's ability to carry out ADLs; assess patient's baseline for ADL function and identify physical deficits which impact ability to perform ADLs (bathing, care of mouth/teeth, toileting, grooming, dressing, etc.)- Assess/evaluate cause of self-care deficits - Assess range of motion- Assess patient's mobility; develop plan if impaired- Assess patient's need for assistive devices and provide as appropriate- Encourage maximum independence but intervene and supervise when necessary- Involve family in performance of ADLs- Assess for home care needs following discharge - Consider OT consult to assist with ADL evaluation and planning for discharge- Provide patient education as appropriate  Outcome: Progressing  Goal: Maintains/Returns to pre admission functional level  Description: INTERVENTIONS:- Perform AM-PAC 6 Click Basic Mobility/ Daily Activity assessment daily.- Set and communicate daily mobility goal to care team and patient/family/caregiver. - Collaborate with rehabilitation services on mobility goals if consulted Out of bed for toileting- Record patient  progress and toleration of activity level   Outcome: Progressing     Problem: POSTPARTUM  Goal: Experiences normal postpartum course  Description: INTERVENTIONS:- Monitor maternal vital signs- Assess uterine involution and lochia  Outcome: Progressing  Goal: Appropriate maternal -  bonding  Description: INTERVENTIONS:- Identify family support- Assess for appropriate maternal/infant bonding -Encourage maternal/infant bonding opportunities- Referral to  or  as needed  Outcome: Progressing  Goal: Establishment of infant feeding pattern  Description: INTERVENTIONS:- Assess breast/bottle feeding- Refer to lactation as needed  Outcome: Progressing  Goal: Incision(s), wounds(s) or drain site(s) healing without S/S of infection  Description: INTERVENTIONS- Assess and document dressing, incision, wound bed, drain sites and surrounding tissue- Provide patient and family education  Outcome: Progressing

## 2025-04-11 NOTE — OB LABOR/OXYTOCIN SAFETY PROGRESS
Labor Progress Note - Deyanira Gómez 34 y.o. female MRN: [id number]    Unit/Bed#: -01 Encounter: [id number]    Dose (duy-units/min) Oxytocin: 10 duy-units/min  Contraction Frequency (minutes): 2-3  Contraction Intensity: Mild/Moderate  Uterine Activity Characteristics: Regular  Cervical Dilation: 10        Cervical Effacement: 100  Fetal Station: 1  Baseline Rate (FHR): 140 bpm  Fetal Heart Rate (FHT): 141 BPM  FHR Category: 2               Vital Signs:   Vitals:    04/10/25 2225   BP: 125/92   Pulse: 95   Resp:    Temp:    SpO2:        Notes/comments:   Some early and variable decels, will start pushing      Shil V DO [place] 4/10/2025 10:44 PM

## 2025-04-11 NOTE — L&D DELIVERY NOTE
DELIVERY NOTE  Deyanira Gómez 34 y.o. female MRN: [id number]  Unit/Bed#: -01 Encounter: [id number]    Obstetrician:  Yumiko Cool DO    Pre-Delivery Diagnosis: Estevez pregnancy in vertex presentation at 38w4d gestation.  Equivocal NST  Prior c section  Prior TOLAC    Post-Delivery Diagnosis: Same, delivered    Procedure: Spontaneous vaginal delivery after     Delivery Date and Time:  4/10/25 2359    Umbilical Artery  Recent Labs     25  0003   PHCART 7.259   BECART -5.5*       Umbilical Vein  Recent Labs     25  0003   PHCVEN 7.342   BECVEN -3.8*       Apgars: Pending    Weight: Pending           Complications: None    Description of Procedure:  Patient was found to be completely dilated and +1 station . She pushed to spontaneously deliver a liveborn infant in OA position through clear fluid at 2359. The head and shoulders delivered easily, with the body easily delivering thereafter. There was a tight nuchal which was clamped and cut at perineum.The infant was placed on maternal abdomen. Cord clamping was  not delayedt. Routine cord gases and cord blood were collected. Pitocin was started for active management of the 3rd stage. Placenta delivered spontaneously and was noted to have a centrally-inserted 3-vessel cord. The placenta was sent to for storage. Bimanual exam was performed, and the fundus was noted to be firm. A thorough pelvic exam revealed a 1st degree laceration, which was repaired with 3-0 polyglactin suture in typical fashion. Excellent hemostasis was noted, with total QBL pending. All needle, sponge, and instrument counts were noted to be correct. The patient tolerated the procedure well and was allowed to recover in labor and delivery room.     Yumiko Cool DO  2025 12:13 AM

## 2025-04-11 NOTE — ANESTHESIA POSTPROCEDURE EVALUATION
Post-Op Assessment Note    CV Status:  Stable    Pain management: adequate      Post-op block assessment: catheter intact and no complications   Mental Status:  Alert and awake   Hydration Status:  Stable   PONV Controlled:  Controlled   Airway Patency:  Patent     Post Op Vitals Reviewed: Yes    No anethesia notable event occurred.    Staff: CRNA   Comments: Epidural catheter removed without difficulty, tip intact, site clean          Last Filed PACU Vitals:  Vitals Value Taken Time   Temp     Pulse     BP     Resp     SpO2

## 2025-04-11 NOTE — LACTATION NOTE
This note was copied from a baby's chart.  CONSULT - LACTATION  Baby Boy Gómez (Anisha) 1 days male MRN: [id number]    [facility] hospital NURSERY Room / Bed:  207(N)/(N) Encounter: [id number]    Maternal Information     MOTHER:  Deyanira Gómez  Maternal Age: 34 y.o.  OB History: # 1 - Date: 16, Sex: Female, Weight: 3140 g (6 lb 14.8 oz), GA: 40w5d, Type: , Low Transverse, Apgar1: 9, Apgar5: 9, Living: Living, Birth Comments: None    # 2 - Date: 04/10/18, Sex: Male, Weight: 2845 g (6 lb 4.4 oz), GA: 37w2d, Type: Vaginal, Vacuum (Extractor), Apgar1: 8, Apgar5: 9, Living: Living, Birth Comments: Baby with Down's, VSD    # 3 - Date: 04/10/25, Sex: Male, Weight: 3860 g (8 lb 8.2 oz), GA: 38w3d, Type: Vaginal, Spontaneous, Apgar1: 6, Apgar5: 8, Living: Living, Birth Comments: None   Previous breast reduction surgery? No    Lactation history:   Has patient previously breast fed: Yes   How long had patient previously breast fed: 2 years with each of her older children   Previous breast feeding complications: None     Past Surgical History:   Procedure Laterality Date     SECTION      [facility]  DELIVERY ONLY N/A 2016    Procedure:  SECTION ();  Surgeon: Memo Chun MD;  Location: [facility] ;  Service: Obstetrics    WISDOM TOOTH EXTRACTION Bilateral        Birth information:  YOB: 2024   Time of birth: 11:59 PM   Sex: male   Delivery type: Vaginal, Spontaneous   Birth Weight: 3860 g (8 lb 8.2 oz)   Percent of Weight Change: 0%     Gestational Age: 38w3d   [unfilled]    Reason for Consult:    Reason for Consult: Initial assessment (ext) - 20 min, Pump Follow Up - 5 min, Alternate Feeding - 10 min, High Risk Infant - 10 min, Discharge (routine) - 10 min    Risk Factors:    Risk Factors:  (Baby on Blood sugar protocol)    Breast and nipple assessment:   Breasts/Nipples  Date Pumping Initiated: 25 (hand pump)  Time Pumping  Initiated: 104  Intervention: Other (comment), Breast pump, Hand expression (Review good latch/feed while establishing breastfeeding & support available)  Breastfeeding Progress: Not yet established    OB Lactation Tools:    Other OB Lactation Tools  Feeding Devices: Pump, Syringe    Breast Pump:    Breast Pump  Pump: Manual, Personal (has Spectra S1 from Insurance)  Pump Review/Education: Setup, frequency, and cleaning, Milk storage  Initiated by: IKER  Date Initiated: 25 (Hand pump while on blood sugars OR till nursing consistently)       Assessment: sleepy    Feeding assessment: feeding well as per experienced Mom; encouraged Latch assessments     LATCH:  Latch: Repeated attempts, hold nipple in mouth, stimulate to suck   Audible Swallowing: A few with stimulation   Type of Nipple: Everted (After stimulation)   Comfort (Breast/Nipple): Soft/non-tender   Hold (Positioning): No assist from staff, mother able to position/hold infant   LATCH Score: 8       HazelBaker:                   Feeding recommendations:  breast feed on demand; hand express prior to feeds while establishing breastfeeding       25 1045   Patient Follow-Up   Lactation Consult Status 2   Follow-Up Type Inpatient;Call as needed   Other OB Lactation Documentation    Additional Problem Noted Initial Visit with experienced Family; states nursing is going well; encouraged latch assessments  (Ready, Set Baby & Discharge Booklets @ bedside)       Verbal review of positioning and alignment. Mom is encouraged to:     - Bring baby up to the breast (use of pillows to elevate so baby's torso is against mom's breasts)   - Skin to skin for feedings with top hand exposed to show signs of satiation   - Chin deep into breast tissue (make baby look up to the nipple)   - nose aligned to the nipple   -Wait for wide gape, place chin behind the areola on the breast so nipple is at the nose. Once baby opens their mouth wide, the nipple will be aimed at  the upper, back palate  - Cheeks should be touching breast, and baby should have a long neck   - Ear, shoulder, hip will be in alignment   - Deep, snug hold of baby up to the breast   - Every baby knows how to breathe at the breast. The tip of the nose may appear to touch the breast. Babies breathe from the side of the nasal passages. If baby can not breathe due to improper alignment, baby will unlatch      Also Ready, Set Baby & discharge breastfeeding booklets including the feeding log. Emphasized 8 or more (12) feedings in a 24 hour period, what to expect for the number of diapers per day of life and the progression of properties of the  stooling pattern.    List of reasons to call a lactation consultant.  Feeding logs  Feeding cues  Hand expression  Baby's Second day (cluster feeding)  Breastfeeding and Your Lifestyle (Medications, Alcohol, Caffeine, Smoking, Street Drugs, Methadone)  First Two Weeks Survival Guide for Breastfeeding  Breast Changes  Physical Therapy  Storage and Handling of Breast milk  How to Keep Your Breast Pump Kit Clean  The Employed Breastfeeding Mother  Mixed feeding  Bottle feeding like breastfeeding (paced bottle feeding)  astfeeding and your lifestyle, storage and preparation of breast milk, how to keep you breast pump clean, the employed breastfeeding mother and paced bottle feeding handouts.     Booklet included Breastfeeding Resources for after discharge including access to the number for the [facility].    Encouraged parents to call for assistance, questions, and concerns about breastfeeding.        Erica Craig RN 2025 11:03 AM

## 2025-04-11 NOTE — PROGRESS NOTES
Deyanira states she is doing well.  No concerns offered.  Breast feeding without difficulty.  Deyanira denies s/s of PPD/anxiety .  She states eating well and drinking well.  Denies N/V.  + void, + flatus, - BM.      V/s 98.2  119/57, 87, 17  Lungs clear bilaterally, RRR  Breasts soft, NT, no cracking of nipples  Abdomen soft, NT, FF@ umbilicus  Perineum well approximated, scant lochia rubra  Lower extremities minimal edema, no varicosities    Labs: AB positive, RI, Hgb 12.3    Postpartum Day # 1, s/p   Breastfeeding  Elevated BP on admission, labs WNL, normotensive     Continue routine postpartum care  Lactation support  Monitor V/S closely  Review s/s of pre-eclampsia  Anticipate discharge on 2025 if status remains unchanged

## 2025-04-11 NOTE — OB LABOR/OXYTOCIN SAFETY PROGRESS
Labor Progress Note - Deyanira Gómez 34 y.o. female MRN: [id number]    Unit/Bed#: -01 Encounter: [id number]    Dose (duy-units/min) Oxytocin: 10 duy-units/min  Contraction Frequency (minutes): 2-3  Contraction Intensity: Mild/Moderate  Uterine Activity Characteristics: Regular  Cervical Dilation: 6-7        Cervical Effacement: 70  Fetal Station: -1  Baseline Rate (FHR): 140 bpm  Fetal Heart Rate (FHT): 141 BPM  FHR Category: 1               Vital Signs:   Vitals:    04/10/25 2009   BP: 122/65   Pulse: 86   Resp:    Temp:    SpO2:        Notes/comments:   Doing well, cont with pitocin. Epidural now      Yumiko Cool DO 4/10/2025 9:02 PM

## 2025-04-11 NOTE — ANESTHESIA PREPROCEDURE EVALUATION
Procedure:  LABOR ANALGESIA    Relevant Problems   CARDIO   (+) Fetal tachycardia affecting management of mother      GYN   (+) 38 weeks gestation of pregnancy        Physical Exam    Airway    Mallampati score: II  TM Distance: >3 FB  Neck ROM: full     Dental   No notable dental hx     Cardiovascular      Pulmonary      Other Findings  post-pubertal.         Latest Reference Range & Units 04/10/25 10:52   WBC 4.31 - 10.16 Thousand/uL 11.16 (H)   RBC 3.81 - 5.12 Million/uL 4.90   Hemoglobin 11.5 - 15.4 g/dL 14.2   Hematocrit 34.8 - 46.1 % 43.4   MCV 82 - 98 fL 89   MCH 26.8 - 34.3 pg 29.0   MCHC 31.4 - 37.4 g/dL 32.7   RDW 11.6 - 15.1 % 14.3   Platelet Count 149 - 390 Thousands/uL 192   MPV 8.9 - 12.7 fL 9.8   (H): Data is abnormally high            Anesthesia Plan  ASA Score- 2     Anesthesia Type- epidural with ASA Monitors.         Additional Monitors:     Airway Plan:            Plan Factors-Exercise tolerance (METS): >4 METS.    Chart reviewed.   Existing labs reviewed. Patient summary reviewed.    Patient is not a current smoker.              Induction-     Postoperative Plan-     Perioperative Resuscitation Plan - Level 1 - Full Code.       Informed Consent- Anesthetic plan and risks discussed with patient.  I personally reviewed this patient with the CRNA. Discussed and agreed on the Anesthesia Plan with the CRNA..      NPO Status:  No vitals data found for the desired time range.

## 2025-04-11 NOTE — PLAN OF CARE
Problem: Knowledge Deficit  Goal: Verbalizes understanding of labor plan  Description: Assess patient/family/caregiver's baseline knowledge level and ability to understand information.  Provide education via patient/family/caregiver's preferred learning method at appropriate level of understanding. 1. Provide teaching at level of understanding.2. Provide teaching via preferred learning method(s).  4/10/2025 2028 by Ariana Redd RN  Outcome: Progressing  4/10/2025 2028 by Ariana Redd RN  Outcome: Progressing  Goal: Patient/family/caregiver demonstrates understanding of disease process, treatment plan, medications, and discharge instructions  Description: Complete learning assessment and assess knowledge base.Interventions:- Provide teaching at level of understanding- Provide teaching via preferred learning methods  Outcome: Progressing     Problem: Labor & Delivery  Goal: Manages discomfort  Description: Assess and monitor for signs and symptoms of discomfort.  Assess patient's pain level regularly and per hospital policy.  Administer medications as ordered. Support use of nonpharmacological methods to help control pain such as distraction, imagery, relaxation, and application of heat and cold.  Collaborate with interdisciplinary team and patient to determine appropriate pain management plan.1. Include patient in decisions related to comfort.2. Offer non-pharmacological pain management interventions.3. Report ineffective pain management to physician.  4/10/2025 2028 by Ariana Redd RN  Outcome: Progressing  4/10/2025 2028 by Ariana Redd RN  Outcome: Progressing  Goal: Patient vital signs are stable  Description: 1. Assess vital signs - vaginal delivery.  4/10/2025 2028 by Ariana Redd RN  Outcome: Progressing  4/10/2025 2028 by Ariana Redd RN  Outcome: Progressing     Problem: PAIN - ADULT  Goal: Verbalizes/displays adequate comfort level or baseline comfort level  Description: Interventions:- Encourage patient  to monitor pain and request assistance- Assess pain using appropriate pain scale- Administer analgesics based on type and severity of pain and evaluate response- Implement non-pharmacological measures as appropriate and evaluate response- Consider cultural and social influences on pain and pain management- Notify physician/advanced practitioner if interventions unsuccessful or patient reports new pain  Outcome: Progressing     Problem: SAFETY ADULT  Goal: Patient will remain free of falls  Description: INTERVENTIONS:- Educate patient/family on patient safety including physical limitations- Instruct patient to call for assistance with activity - Consult OT/PT to assist with strengthening/mobility - Keep Call bell within reach- Keep bed low and locked with side rails adjusted as appropriate- Keep care items and personal belongings within reach- Initiate and maintain comfort rounds- Make Fall Risk Sign visible to staff- OOutcome: Progressing  Goal: Maintain or return to baseline ADL function  Description: INTERVENTIONS:-  Assess patient's ability to carry out ADLs; assess patient's baseline for ADL function and identify physical deficits which impact ability to perform ADLs (bathing, care of mouth/teeth, toileting, grooming, dressing, etc.)- Assess/evaluate cause of self-care deficits - Assess range of motion- Assess patient's mobility; develop plan if impaired- Assess patient's need for assistive devices and provide as appropriate- Encourage maximum independence but intervene and supervise when necessary- Involve family in performance of ADLs- Assess for home care needs following discharge - Consider OT consult to assist with ADL evaluation and planning for discharge- Provide patient education as appropriate  Outcome: Progressing  Goal: Maintains/Returns to pre admission functional level  Description: INTERVENTIONS:- Perform AM-PAC 6 Click Basic Mobility/ Daily Activity assessment daily.- Set and communicate daily  mobility goal to care team and patient/family/caregiver. - Collaborate with rehabilitation services on mobility goals if consulted- Range of Motion   Outcome: Progressing

## 2025-04-11 NOTE — ANESTHESIA PROCEDURE NOTES
Epidural Block    Patient location during procedure: OB/L&D  Start time: 4/10/2025 9:30 PM  Reason for block: primary anesthetic  Staffing  Performed by: Nurys Lilly MD  Authorized by: Nurys Lilly MD    Preanesthetic Checklist  Completed: patient identified, IV checked, site marked, risks and benefits discussed, surgical consent, monitors and equipment checked, pre-op evaluation and timeout performed  Epidural  Patient position: sitting  Prep: ChloraPrep  Sedation Level: no sedation  Patient monitoring: heart rate, frequent blood pressure checks and cardiac monitor  Approach: midline  Location: lumbar, L4-5  Injection technique: DEBBIE saline and DEBBIE air  Needle  Needle type: Tuohy   Needle gauge: 17 G  Needle insertion depth: 5 cm  Catheter type: spring wound, multi-orifice and closed tip  Catheter size: 20 G  Catheter at skin depth: 10 cm  Catheter securement method: tape, stabilization device and clear occlusive dressing  Test dose: negative  Assessment  Sensory level: T10  Number of attempts: 2no paresthesia on injection, negative aspiration for heme and negative aspiration for CSF  patient tolerated the procedure well with no immediate complications  Additional Notes  First attempt at L3-L4 - tight spaces, moved down 1 level  Second attempt at L4-L5 straight-forward

## 2025-04-12 VITALS
DIASTOLIC BLOOD PRESSURE: 70 MMHG | RESPIRATION RATE: 18 BRPM | HEART RATE: 79 BPM | TEMPERATURE: 97.8 F | SYSTOLIC BLOOD PRESSURE: 139 MMHG | OXYGEN SATURATION: 98 %

## 2025-04-12 PROBLEM — O34.219 VBAC, DELIVERED: Status: ACTIVE | Noted: 2025-04-12

## 2025-04-12 PROBLEM — Z34.90 ENCOUNTER FOR INDUCTION OF LABOR: Status: RESOLVED | Noted: 2025-04-10 | Resolved: 2025-04-12

## 2025-04-12 PROCEDURE — NC001 PR NO CHARGE: Performed by: OBSTETRICS & GYNECOLOGY

## 2025-04-12 PROCEDURE — 99024 POSTOP FOLLOW-UP VISIT: CPT | Performed by: OBSTETRICS & GYNECOLOGY

## 2025-04-12 RX ORDER — DOCUSATE SODIUM 100 MG/1
100 CAPSULE, LIQUID FILLED ORAL 2 TIMES DAILY
Start: 2025-04-12

## 2025-04-12 RX ORDER — ACETAMINOPHEN 325 MG/1
650 TABLET ORAL EVERY 4 HOURS PRN
Start: 2025-04-12

## 2025-04-12 RX ORDER — IBUPROFEN 600 MG/1
600 TABLET, FILM COATED ORAL EVERY 6 HOURS
Start: 2025-04-12

## 2025-04-12 RX ADMIN — IBUPROFEN 600 MG: 600 TABLET ORAL at 06:31

## 2025-04-12 RX ADMIN — IBUPROFEN 600 MG: 600 TABLET ORAL at 00:24

## 2025-04-12 RX ADMIN — BENZOCAINE AND LEVOMENTHOL 1 APPLICATION: 200; 5 SPRAY TOPICAL at 06:32

## 2025-04-12 RX ADMIN — DOCUSATE SODIUM 100 MG: 100 CAPSULE, LIQUID FILLED ORAL at 08:47

## 2025-04-12 NOTE — PLAN OF CARE
Problem: PAIN - ADULT  Goal: Verbalizes/displays adequate comfort level or baseline comfort level  Description: Interventions:- Encourage patient to monitor pain and request assistance- Assess pain using appropriate pain scale- Administer analgesics based on type and severity of pain and evaluate response- Implement non-pharmacological measures as appropriate and evaluate response- Consider cultural and social influences on pain and pain management- Notify physician/advanced practitioner if interventions unsuccessful or patient reports new pain  Outcome: Progressing     Problem: SAFETY ADULT  Goal: Patient will remain free of falls  Description: INTERVENTIONS:- Educate patient/family on patient safety including physical limitations- Instruct patient to call for assistance with activity - Consult OT/PT to assist with strengthening/mobility - Keep Call bell within reach- Keep bed low and locked with side rails adjusted as appropriate- Keep care items and personal belongings within reach- Initiate and maintain comfort rounds- Make Fall Risk Sign visible to staff Apply yellow socks and bracelet for high fall risk patients- Consider moving patient to room near nurses station  Outcome: Progressing  Goal: Maintain or return to baseline ADL function  Description: INTERVENTIONS:-  Assess patient's ability to carry out ADLs; assess patient's baseline for ADL function and identify physical deficits which impact ability to perform ADLs (bathing, care of mouth/teeth, toileting, grooming, dressing, etc.)- Assess/evaluate cause of self-care deficits - Assess range of motion- Assess patient's mobility; develop plan if impaired- Assess patient's need for assistive devices and provide as appropriate- Encourage maximum independence but intervene and supervise when necessary- Involve family in performance of ADLs- Assess for home care needs following discharge - Consider OT consult to assist with ADL evaluation and planning for  discharge- Provide patient education as appropriate  Outcome: Progressing  Goal: Maintains/Returns to pre admission functional level  Description: INTERVENTIONS:- Perform AM-PAC 6 Click Basic Mobility/ Daily Activity assessment daily.- Set and communicate daily mobility goal to care team and patient/family/caregiver. - Collaborate with rehabilitation services on mobility goals if consulted- Perform Range of Motion  Outcome: Progressing     Problem: POSTPARTUM  Goal: Experiences normal postpartum course  Description: INTERVENTIONS:- Monitor maternal vital signs- Assess uterine involution and lochia  Outcome: Progressing  Goal: Appropriate maternal -  bonding  Description: INTERVENTIONS:- Identify family support- Assess for appropriate maternal/infant bonding -Encourage maternal/infant bonding opportunities- Referral to  or  as needed  Outcome: Progressing  Goal: Establishment of infant feeding pattern  Description: INTERVENTIONS:- Assess breast/bottle feeding- Refer to lactation as needed  Outcome: Progressing  Goal: Incision(s), wounds(s) or drain site(s) healing without S/S of infection  Description: INTERVENTIONS- Assess and document dressing, incision, wound bed, drain sites and surrounding tissue-   Outcome: Progressing

## 2025-04-12 NOTE — PLAN OF CARE
Problem: PAIN - ADULT  Goal: Verbalizes/displays adequate comfort level or baseline comfort level  Description: Interventions:- Encourage patient to monitor pain and request assistance- Assess pain using appropriate pain scale- Administer analgesics based on type and severity of pain and evaluate response- Implement non-pharmacological measures as appropriate and evaluate response- Consider cultural and social influences on pain and pain management- Notify physician/advanced practitioner if interventions unsuccessful or patient reports new pain  Outcome: Progressing     Problem: SAFETY ADULT  Goal: Patient will remain free of falls  Description: INTERVENTIONS:- Educate patient/family on patient safety including physical limitations- Instruct patient to call for assistance with activity - Consult OT/PT to assist with strengthening/mobility - Keep Call bell within reach- Keep bed low and locked with side rails adjusted as appropriate- Keep care items and personal belongings within reach- Initiate and maintain comfort rounds- Make Fall Risk Sign visible to staff- Outcome: Progressing  Goal: Maintain or return to baseline ADL function  Description: INTERVENTIONS:-  Assess patient's ability to carry out ADLs; assess patient's baseline for ADL function and identify physical deficits which impact ability to perform ADLs (bathing, care of mouth/teeth, toileting, grooming, dressing, etc.)- Assess/evaluate cause of self-care deficits - Assess range of motion- Assess patient's mobility; develop plan if impaired- Assess patient's need for assistive devices and provide as appropriate- Encourage maximum independence but intervene and supervise when necessary- Involve family in performance of ADLs- Assess for home care needs following discharge - Consider OT consult to assist with ADL evaluation and planning for discharge- Provide patient education as appropriate  Outcome: Progressing  Goal: Maintains/Returns to pre admission  functional level  Description: INTERVENTIONS:- Perform AM-PAC 6 Click Basic Mobility/ Daily Activity assessment daily.- Set and communicate daily mobility goal to care team and patient/family/caregiver. - Collaborate with rehabilitation services on mobility goals if consulted- Outcome: Progressing     Problem: POSTPARTUM  Goal: Experiences normal postpartum course  Description: INTERVENTIONS:- Monitor maternal vital signs- Assess uterine involution and lochia  Outcome: Progressing  Goal: Appropriate maternal -  bonding  Description: INTERVENTIONS:- Identify family support- Assess for appropriate maternal/infant bonding -Encourage maternal/infant bonding opportunities- Referral to  or  as needed  Outcome: Progressing  Goal: Establishment of infant feeding pattern  Description: INTERVENTIONS:- Assess breast/bottle feeding- Refer to lactation as needed  Outcome: Progressing  Goal: Incision(s), wounds(s) or drain site(s) healing without S/S of infection  Description: INTERVENTIONS- Assess and document dressing, incision, wound bed, drain sites and surrounding tissue- Provide patient and family education-Outcome: Progressing

## 2025-04-12 NOTE — PROGRESS NOTES
Progress Note - OB/GYN   Name: Deyanira Gómez 34 y.o. female I MRN: [id number]  Unit/Bed#: -01 I Date of Admission: 4/10/2025   Date of Service: 2025 I Hospital Day: 2    Assessment & Plan  Elevated blood pressure reading without diagnosis of hypertension  Initial BP here 149/82.  All subsequent BP have been normal.  No symptoms of severe preeclampsia; laboratory studies without evidence of end-organ damage.    , delivered  - meeting all postpartum milestones  - home today    OB Post-Partum Progress Note  Subjective   Post delivery. Patient is doing well. Lochia WNL. Pain well controlled.     Pain: yes, cramping, improved with meds  Tolerating PO: yes  Voiding: yes  Flatus: yes  BM: no  Ambulating: yes  Breastfeeding:  yes  Chest pain: no  Shortness of breath: no  Leg pain: no  Lochia: WNL    Objective :  Temp:  [97.8 °F (36.6 °C)-98.7 °F (37.1 °C)] 97.8 °F (36.6 °C)  HR:  [79-93] 79  BP: (111-139)/(57-76) 139/70  Resp:  [17-18] 18  SpO2:  [94 %-98 %] 98 %  O2 Device: None (Room air)    Physical Exam  Constitutional:       Appearance: Normal appearance.   HENT:      Head: Normocephalic.   Cardiovascular:      Rate and Rhythm: Normal rate and regular rhythm.   Pulmonary:      Effort: Pulmonary effort is normal.   Abdominal:      Tenderness: There is no abdominal tenderness.   Musculoskeletal:         General: No swelling.   Neurological:      General: No focal deficit present.      Mental Status: She is alert and oriented to person, place, and time.   Skin:     General: Skin is warm and dry.      Coloration: Skin is not pale.   Psychiatric:         Mood and Affect: Mood normal.         Behavior: Behavior normal.   Vitals reviewed.          Lab Results: I have reviewed the following results:  Lab Results   Component Value Date    WBC 17.69 (H) 2025    HGB 12.3 2025    HCT 37.6 2025    MCV 89 2025     2025

## 2025-04-12 NOTE — DISCHARGE INSTR - AVS FIRST PAGE
Nothing in the vagina for 6 weeks  Increase activity as tolerated  Call with temperature greater than 100.4, uncontrolled pain or vaginal bleeding that soaks more than one pad per hour

## 2025-04-12 NOTE — ASSESSMENT & PLAN NOTE
Initial BP here 149/82.  All subsequent BP have been normal.  No symptoms of severe preeclampsia; laboratory studies without evidence of end-organ damage.

## 2025-04-14 NOTE — DISCHARGE SUMMARY
Discharge Summary - OB/GYN   Name: Deyanira Gómez 34 y.o. female I MRN: [id number]  Unit/Bed#: -01 I Date of Admission: 4/10/2025   Date of Service: 2025 I Hospital Day: 2    ADMISSION  Patient of: [facility]  Admission Date: 4/10/2025   Admitting Attending: Dr. 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 VilchisMickey  Discharge Diagnosis: Same, Delivered    Clinical course: Admission to Delivery  Deyanira Gómez is a 34 y.o.  who was admitted at 38w3d for IOL due to non-reassuring FHR tracing.    Reason for induction:  NR-NST      Induction method:  , ,None  .     For pain control in labor, pt received pitocin.  The labor course was uncomplicated.  She progressed to complete and began pushing.    Delivery  Route of Delivery: Vaginal, Spontaneous ()      Anesthesia: Epidural,   QBL: Non-Surgical QBL (mL): 50          Delivery: Vaginal, Spontaneous at 4/10/2025 11:59 PM  Laceration: Perineal: 1° Repaired? Yes    Baby's Weight: 3860 g (8 lb 8.2 oz); 136.16    Apgar scores: 6  and 8  at 1 and 5 minutes, respectively    Clinical Course: Post-Delivery:  The  post delivery course was remarkable.    On the day of discharge, the patient was ambulating, voiding spontaneously, tolerating oral intake, and hemodynamically stable. She was able to reasonably perform all ADLs. She had appropriate bowel function. Pain was well-controlled. She was discharged home on postpartum/postop day # 2 without complications. Patient was instructed to follow up with her OB as an outpatient and was given appropriate warnings to call her provider with problems or concerns.    Pertinent lab findings included:   Blood type AB+.     Last three Hgb values:  Lab Results   Component Value Date    HGB 12.3 2025    HGB 14.2 04/10/2025    HGB 14.0 2025        Problem-specific follow-up plans included the following:  Assessment & Plan  Elevated blood pressure reading without diagnosis of hypertension  Initial BP here 149/82.  All subsequent BP have been normal.  No symptoms of severe preeclampsia; laboratory studies without evidence of end-organ damage.    , delivered  - meeting all postpartum milestones  - home today      Discharge med list:  Contraception: undecided     Medication List      START taking these medications     acetaminophen 325 mg tablet; Commonly known as: TYLENOL; Take 2 tablets   (650 mg total) by mouth every 4 (four) hours as needed for mild pain   docusate sodium 100 mg capsule; Commonly known as: COLACE; Take 1   capsule (100 mg total) by mouth 2 (two) times a day   ibuprofen 600 mg tablet; Commonly known as: MOTRIN; Take 1 tablet (600   mg total) by mouth every 6 (six) hours     CONTINUE taking these medications     Prenatal Vitamin 27-0.8 MG Tabs       Condition at discharge:   good     Disposition:   Home    Planned Readmission:   No    Discharge Statement:  I have spent a total time of 40 minutes in caring for this patient on the day of the visit/encounter. >30 minutes of time was spent on: Diagnostic results, Patient and family education, Documenting in the  medical record, and Reviewing / ordering tests, medicine, procedures  .

## 2025-04-14 NOTE — UTILIZATION REVIEW
"  MOTHER AND BABY DISCHARGED     NOTIFICATION OF INPATIENT ADMISSION   MATERNITY/DELIVERY AUTHORIZATION REQUEST   SERVICING FACILITY:   [facility] - L&D, [place], NICU  [address] [place] [address]  Tax ID: [id number]  NPI: [id number] ATTENDING PROVIDER:  Attending Name and NPI#: Yumiko PAYTON Do [[id number]]  Address: [address] [place] [address]  Phone: [phone]     ADMISSION INFORMATION:  Place of Service: Inpatient [facility]  Place of Service Code: 21  Inpatient Admission Date/Time: 4/10/25 11:51 AM  Discharge Date/Time: 2025  1:14 PM  Admitting Diagnosis Code/Description:  No admission diagnoses are documented for this encounter.     Mother: Deyanira Gómez 1990 Estimated Date of Delivery: 25  Delivering clinician: Yumiko Cool   OB History          3    Para   3    Term   3            AB        Living   3         SAB        IAB        Ectopic        Multiple   0    Live Births   3               [place] Name & MRN:   Information for the patient's :  Rico, Baby Boy (Deyanira) [[id number]]   [place] Delivery Information:  Sex: male  Delivered 4/10/2025 11:59 PM by Vaginal, Spontaneous; Gestational Age: 38w3d     Measurements:  Weight: 8 lb 8.2 oz (3860 g);  Height: 20.5\"    APGAR 1 minute 5 minutes 10 minutes   Totals: 6 8       UTILIZATION REVIEW CONTACT:  Opal Forte, Utilization   Network Utilization Review Department  Phone: [phone]  Fax [phone]  Email: [email or web address]  Contact for approvals/pending authorizations, clinical reviews, and discharge.     PHYSICIAN ADVISORY SERVICES:  Medical Necessity Denial & Mcqm-dy-Dfxl Review  Phone: [phone]  Fax: [phone]  Email: [email or web address]     DISCHARGE SUPPORT TEAM:  For Patients Discharge Needs & Updates  Phone: [phone] opt. 2 Fax: [phone]  Email: " [email or web address]     NOTIFICATION OF ADMISSION DISCHARGE   This is a Notification of Discharge from [facility]. Please be advised that this patient has been discharge from our facility. Below you will find the admission and discharge date and time including the patient’s disposition.   UTILIZATION REVIEW CONTACT:  Utilization Review Assistants  Network Utilization Review Department  Phone: [phone] x carefully listen to the prompts. All voicemails are confidential.  Email: [email or web address]     ADMISSION INFORMATION  PRESENTATION DATE: 4/10/2025  9:30 AM  OBERVATION ADMISSION DATE: N/A  INPATIENT ADMISSION DATE: 4/10/25 11:51 AM   DISCHARGE DATE: 4/12/2025  1:14 PM   DISPOSITION:Home/Self Care    Network Utilization Review Department  ATTENTION: Please call with any questions or concerns to [phone] and carefully listen to the prompts so that you are directed to the right person. All voicemails are confidential.   For Discharge needs, contact Care Management DC Support Team at [phone] opt. 2  Send all requests for admission clinical reviews, approved or denied determinations and any other requests to dedicated fax number below belonging to the campus where the patient is receiving treatment. List of dedicated fax numbers for the Facilities:  FACILITY NAME UR FAX NUMBER   ADMISSION DENIALS (Administrative/Medical Necessity) [phone]   DISCHARGE SUPPORT TEAM ([facility]) [phone]   PARENT CHILD HEALTH (Maternity/NICU/Pediatrics) [phone]   [facility] [phone]   [facility] [phone]   [facility] [phone]   [facility] [phone]   [facility] [phone]   [facility] [phone]   [facility] [phone]   [facility]  [place] [phone]   [facility] [phone]   Critical access hospital [phone]   [facility] [phone]   [facility] [phone]

## 2025-04-19 LAB — PLACENTA IN STORAGE: NORMAL

## 2025-04-21 ENCOUNTER — TELEPHONE (OUTPATIENT)
Dept: OBGYN CLINIC | Facility: CLINIC | Age: 35
End: 2025-04-21

## 2025-04-21 NOTE — TELEPHONE ENCOUNTER
POSTPARTUM PHONE CALL ASSESSMENT    Date of Delivery: 4/10/2025  Delivering Provider: Dr Yumiko Cool  Mode:   Delivery Notes/Complications:  - IOL, (+) GBS (tx)    Do you still have bleeding/pain? If so, how much/how severe?   - mod flow (changing q 5 hrs)  Regular BMs/Urination?   - normal bowel &  ladder habits  Breastfeeding/Formula/Both?   - breastfeeding q 2 hrs  How are you doing emotionally?   - patient states she is feeling well- has help @ home, naps encouraged when able  Do you have any other questions or concerns for us or your provider? no    Have you scheduled the pediatrician appointment with pediatrician?   - [facility] - baby back to birth weight   Do you have a postpartum visit scheduled? Yes 2025 @ 2:45 pm (Dr Cool)

## 2025-05-06 ENCOUNTER — POSTPARTUM VISIT (OUTPATIENT)
Dept: OBGYN CLINIC | Facility: CLINIC | Age: 35
End: 2025-05-06

## 2025-05-06 VITALS
SYSTOLIC BLOOD PRESSURE: 110 MMHG | HEIGHT: 64 IN | BODY MASS INDEX: 30.56 KG/M2 | DIASTOLIC BLOOD PRESSURE: 70 MMHG | WEIGHT: 179 LBS

## 2025-05-06 DIAGNOSIS — Z30.09 BIRTH CONTROL COUNSELING: Primary | ICD-10-CM

## 2025-05-06 PROCEDURE — 99024 POSTOP FOLLOW-UP VISIT: CPT | Performed by: OBSTETRICS & GYNECOLOGY

## 2025-05-06 RX ORDER — ACETAMINOPHEN AND CODEINE PHOSPHATE 120; 12 MG/5ML; MG/5ML
1 SOLUTION ORAL DAILY
Qty: 28 TABLET | Refills: 4 | Status: SHIPPED | OUTPATIENT
Start: 2025-05-06

## 2025-05-06 NOTE — PROGRESS NOTES
"[facility] - [place]  [address] [place] [address]    Assessment/Plan:  Deyanira is a 34 y.o. year old  who presents for postpartum visit.    Routine Postpartum Care  Normal postpartum exam  Contraception: oral progesterone-only contraceptive  Depression Screen: 3  Feeding: breastfeeding  Cervical cancer screening Up to Date, next due   Follow up in: 3 months for annual exam or as needed.  Assessment & Plan  Birth control counseling    Orders:    norethindrone (Katerine) 0.35 MG tablet; Take 1 tablet (0.35 mg total) by mouth daily    Subjective:     CC: Postpartum visit    Deyanira Gómez is a 34 y.o. y.o. female  who presents for a postpartum visit.     She is 4 weeks postpartum following a  on 4/10/25 at 38.3 weeks. Postpartum course has been uncomplicated. She reports no complaints and is feeling well. .     Bleeding thin lochia. Bowel function is normal. Bladder function is normal. Patient is not sexually active.     Postpartum Depression: Low Risk  (2025)    [place]  Depression Scale     Last EPDS Total Score: 3     Last EPDS Self Harm Result: Never       The following portions of the patient's history were reviewed and updated as appropriate: allergies, current medications, past family history, past medical history, obstetric history, gynecologic history, past social history, past surgical history and problem list.    Objective:  /70 (BP Location: Left arm, Patient Position: Sitting, Cuff Size: Standard)   Ht 5' 4\" (1.626 m)   Wt 81.2 kg (179 lb)   LMP 07/15/2024   Breastfeeding Yes   BMI 30.73 kg/m²   Pregravid Weight/BMI: 73.9 kg (163 lb) (BMI 27.97)  Current Weight: 81.2 kg (179 lb)   Total Weight Gain: 17.6 kg (38 lb 12.8 oz)   Pre- Vitals      Flowsheet Row Most Recent Value   Prenatal Assessment    Prenatal Vitals    Blood Pressure 110/70   Weight - Scale 81.2 kg (179 lb)   Urine Albumin/Glucose    Dilation/Effacement/Station    Vaginal " Drainage    Edema              General Appearance: alert and oriented, in no acute distress.   Abdomen: Soft, non-tender, non-distended, no masses, no rebound or guarding.  Pelvic:       External genitalia: Normal appearance, no abnormal pigmentation, no lesions or masses. Normal Bartholin's and [place]'s. Obstetric laceration well-healed.       Urinary system: Urethral meatus normal, bladder non-tender.      Vaginal: normal mucosa without prolapse or lesions. Normal-appearing physiologic discharge.  Extremities: Normal range of motion.   Skin: normal, no rash or abnormalities  Neurologic: alert, oriented x3  Psychiatric: Appropriate affect, mood stable, cooperative with exam.        Yumiko Cool,   5/6/2025 2:51 PM

## 2025-06-01 DIAGNOSIS — Z30.09 BIRTH CONTROL COUNSELING: ICD-10-CM

## 2025-06-02 RX ORDER — ACETAMINOPHEN AND CODEINE PHOSPHATE 120; 12 MG/5ML; MG/5ML
1 SOLUTION ORAL DAILY
Qty: 84 TABLET | Refills: 1 | Status: SHIPPED | OUTPATIENT
Start: 2025-06-02

## 2025-08-04 ENCOUNTER — ANNUAL EXAM (OUTPATIENT)
Dept: OBGYN CLINIC | Facility: CLINIC | Age: 35
End: 2025-08-04
Payer: COMMERCIAL

## 2025-08-04 VITALS
DIASTOLIC BLOOD PRESSURE: 72 MMHG | HEIGHT: 64 IN | WEIGHT: 183.8 LBS | SYSTOLIC BLOOD PRESSURE: 110 MMHG | BODY MASS INDEX: 31.38 KG/M2

## 2025-08-04 DIAGNOSIS — Z01.419 ENCOUNTER FOR ANNUAL ROUTINE GYNECOLOGICAL EXAMINATION: Primary | ICD-10-CM

## 2025-08-04 DIAGNOSIS — Z30.41 ORAL CONTRACEPTIVE PILL SURVEILLANCE: ICD-10-CM

## 2025-08-04 PROCEDURE — 99395 PREV VISIT EST AGE 18-39: CPT | Performed by: STUDENT IN AN ORGANIZED HEALTH CARE EDUCATION/TRAINING PROGRAM

## 2025-08-04 RX ORDER — ACETAMINOPHEN AND CODEINE PHOSPHATE 120; 12 MG/5ML; MG/5ML
1 SOLUTION ORAL DAILY
Qty: 84 TABLET | Refills: 4 | Status: SHIPPED | OUTPATIENT
Start: 2025-08-04